# Patient Record
Sex: FEMALE | Race: WHITE | NOT HISPANIC OR LATINO | Employment: UNEMPLOYED | URBAN - NONMETROPOLITAN AREA
[De-identification: names, ages, dates, MRNs, and addresses within clinical notes are randomized per-mention and may not be internally consistent; named-entity substitution may affect disease eponyms.]

---

## 2017-01-03 ENCOUNTER — CLINICAL SUPPORT (OUTPATIENT)
Dept: ORTHOPEDIC SURGERY | Facility: CLINIC | Age: 82
End: 2017-01-03

## 2017-01-03 DIAGNOSIS — M17.0 PRIMARY OSTEOARTHRITIS OF BOTH KNEES: Primary | ICD-10-CM

## 2017-01-03 PROCEDURE — 20610 DRAIN/INJ JOINT/BURSA W/O US: CPT | Performed by: NURSE PRACTITIONER

## 2017-01-03 NOTE — PROGRESS NOTES
Knee Joint Injection      Patient: Monserrat Downs    YOB: 1930    Chief Complaint   Patient presents with   • Left Knee - Injections     #2 orthovisc   • Right Knee - Injections     #2 orthovisc       History of Present Illness:  Patient is here for an injection.  No new complaints.    Physical Exam: 86 y.o. female  General Appearance:    Alert, cooperative, in no acute distress                   There were no vitals filed for this visit.     Patient is alert and oriented, ×3 no acute distress.  Affect is normal respiratory rate is normal unlabored.  Exam and complaints are unchanged.    Procedure:  Large Joint Arthrocentesis  Date/Time: 1/3/2017 3:40 PM  Consent given by: patient  Site marked: site marked  Timeout: Immediately prior to procedure a time out was called to verify the correct patient, procedure, equipment, support staff and site/side marked as required   Supporting Documentation  Indications: pain   Procedure Details  Location: knee - R knee  Preparation: Patient was prepped and draped in the usual sterile fashion  Needle size: 22 G  Approach: anteromedial  Medications administered: 30 mg Hyaluronan 30 MG/2ML  Patient tolerance: patient tolerated the procedure well with no immediate complications      Large Joint Arthrocentesis  Date/Time: 1/3/2017 3:40 PM  Consent given by: patient  Site marked: site marked  Timeout: Immediately prior to procedure a time out was called to verify the correct patient, procedure, equipment, support staff and site/side marked as required   Supporting Documentation  Indications: pain   Procedure Details  Location: knee - L knee  Preparation: Patient was prepped and draped in the usual sterile fashion  Needle size: 22 G  Approach: lateral  Medications administered: 30 mg Hyaluronan 30 MG/2ML  Patient tolerance: patient tolerated the procedure well with no immediate complications            Assessment:    Diagnoses and all orders for this visit:    Primary  osteoarthritis of both knees  -     Large Joint Arthrocentesis  -     Large Joint Arthrocentesis        Plan:   Slowly increase activity as tolerated.  Discussed importance of leg strengthening and general conditioning.  Discussed warning signs of injection.  Discussed that purpose of injections was symptom improvement and improved activity.  Also discussed that further treatment options depended on symptoms at followup and length of time of improvement after injections.    No Follow-up on file.    Jermain Marcelo, APRN

## 2017-01-03 NOTE — MR AVS SNAPSHOT
Connie Day   1/3/2017 3:40 PM   Clinical Support    Dept Phone:  [phone]   Encounter #:  [id number]    Provider:  PIETER Sherman   Department:  [facility] ORTHOPEDICS                Your Full Care Plan              Your Updated Medication List          This list is accurate as of: 1/3/17  4:45 PM.  Always use your most recent med list.                acetaminophen 325 MG tablet   Commonly known as:  TYLENOL       albuterol 108 (90 BASE) MCG/ACT inhaler   Commonly known as:  PROVENTIL HFA;VENTOLIN HFA   Inhale 2 puffs Every 4 (Four) Hours As Needed for wheezing.       aspirin 81 MG EC tablet       atorvastatin 10 MG tablet   Commonly known as:  LIPITOR   TAKE 1 TABLET DAILY       celecoxib 200 MG capsule   Commonly known as:  CeleBREX   TAKE 1 CAPSULE DAILY       CENTRUM SILVER PO       CITRACAL + D PO       docusate sodium 100 MG capsule   Commonly known as:  COLACE   Take 1 capsule by mouth 2 (Two) Times a Day.       fish oil 1000 MG capsule capsule       polyethylene glycol packet   Commonly known as:  MIRALAX       senna 8.6 MG tablet tablet   Commonly known as:  SENOKOT               We Performed the Following     Large Joint Arthrocentesis     Large Joint Arthrocentesis       You Were Diagnosed With        Codes Comments    Primary osteoarthritis of both knees    -  Primary ICD-10-CM: M17.0  ICD-9-CM: 715.16       Medications to be Given to You by a Medical Professional     Due       Frequency    12/6/2016 cyanocobalamin injection 1,000 mcg  Every 28 Days    1/2/2017 cyanocobalamin injection 1,000 mcg  Every 30 Days      Instructions     None    Patient Instructions History      Upcoming Appointments     Visit Type Date Time Department    INJECTION 1/3/2017  3:40 PM [facility]    INJECTION 1/10/2017  1:00 PM [facility]    INJECTION 1/17/2017  1:00 PM [facility]    OFFICE VISIT 5/17/2017  1:30 PM [facility]         MyChart Signup     Our records indicate that you have declined [facility] sign[facility]. If you would like to sign up for [facility], please email [email or web address] or call [phone] to obtain an activation code.             Other Info from Your Visit           Your Appointments     Raji 10, 2017  1:00 PM CST   Injection with PIETER Sherman   [facility] ORTHOPEDICS (--)    [address]  [facility] [address] [phone]            Jan 17, 2017  1:00 PM CST   Injection with PIETER Sherman   [facility] ORTHOPEDICS (--)    [address]  [facility] [address] [phone]            May 17, 2017  1:30 PM CDT   Office Visit with Mino Williamson MD   [facility] FAMILY MEDICINE (--)    [address] [place] [address] [phone]           Arrive 15 minutes prior to appointment.              Allergies     No Known Allergies      Reason for Visit     Left Knee - Injections #2 orthovisc    Right Knee - Injections #2 orthovisc      Vital Signs     Smoking Status                   Never Smoker           Problems and Diagnoses Noted     Primary osteoarthritis of both knees

## 2017-01-09 ENCOUNTER — CLINICAL SUPPORT (OUTPATIENT)
Dept: FAMILY MEDICINE CLINIC | Facility: CLINIC | Age: 82
End: 2017-01-09

## 2017-01-09 DIAGNOSIS — D51.0 PERNICIOUS ANEMIA: Primary | ICD-10-CM

## 2017-01-09 PROCEDURE — 96372 THER/PROPH/DIAG INJ SC/IM: CPT | Performed by: FAMILY MEDICINE

## 2017-01-09 RX ADMIN — CYANOCOBALAMIN 1000 MCG: 1000 INJECTION, SOLUTION INTRAMUSCULAR; SUBCUTANEOUS at 11:32

## 2017-01-10 ENCOUNTER — CLINICAL SUPPORT (OUTPATIENT)
Dept: ORTHOPEDIC SURGERY | Facility: CLINIC | Age: 82
End: 2017-01-10

## 2017-01-10 DIAGNOSIS — M25.561 CHRONIC PAIN OF BOTH KNEES: ICD-10-CM

## 2017-01-10 DIAGNOSIS — G89.29 CHRONIC PAIN OF BOTH KNEES: ICD-10-CM

## 2017-01-10 DIAGNOSIS — M25.562 CHRONIC PAIN OF BOTH KNEES: ICD-10-CM

## 2017-01-10 DIAGNOSIS — M17.0 PRIMARY OSTEOARTHRITIS OF BOTH KNEES: Primary | ICD-10-CM

## 2017-01-10 PROCEDURE — 20610 DRAIN/INJ JOINT/BURSA W/O US: CPT | Performed by: NURSE PRACTITIONER

## 2017-01-10 NOTE — PROGRESS NOTES
Knee Joint Injection      Patient: Monserrat Downs    YOB: 1930    Chief Complaint   Patient presents with   • Right Knee - Follow-up   • Left Knee - Follow-up   • Injections     orthovisc #3 bi lat knees       History of Present Illness:  Patient is here for an injection.  No new complaints.    Physical Exam: 86 y.o. female  General Appearance:    Alert, cooperative, in no acute distress                   There were no vitals filed for this visit.     Patient is alert and oriented, ×3 no acute distress.  Affect is normal respiratory rate is normal unlabored.  Exam and complaints are unchanged.    Procedure:  Large Joint Arthrocentesis  Date/Time: 1/10/2017 1:08 PM  Consent given by: patient  Site marked: site marked  Timeout: Immediately prior to procedure a time out was called to verify the correct patient, procedure, equipment, support staff and site/side marked as required   Supporting Documentation  Indications: pain   Procedure Details  Location: knee - R knee  Preparation: Patient was prepped and draped in the usual sterile fashion  Needle size: 22 G  Approach: anteromedial  Medications administered: 30 mg Hyaluronan 30 MG/2ML  Patient tolerance: patient tolerated the procedure well with no immediate complications    Large Joint Arthrocentesis  Date/Time: 1/10/2017 1:08 PM  Consent given by: patient  Site marked: site marked  Timeout: Immediately prior to procedure a time out was called to verify the correct patient, procedure, equipment, support staff and site/side marked as required   Supporting Documentation  Indications: pain   Procedure Details  Location: knee - L knee  Needle size: 22 G  Approach: lateral  Medications administered: 30 mg Hyaluronan 30 MG/2ML  Patient tolerance: patient tolerated the procedure well with no immediate complications          Assessment:    Diagnoses and all orders for this visit:    Primary osteoarthritis of both knees  -     Large Joint Arthrocentesis  -      Large Joint Arthrocentesis    Chronic pain of both knees  -     Large Joint Arthrocentesis  -     Large Joint Arthrocentesis        Plan:   Slowly increase activity as tolerated.  Discussed importance of leg strengthening and general conditioning.  Discussed warning signs of injection.  Discussed that purpose of injections was symptom improvement and improved activity.  Also discussed that further treatment options depended on symptoms at followup and length of time of improvement after injections.    No Follow-up on file.    Jermain Marcelo, APRN

## 2017-01-10 NOTE — MR AVS SNAPSHOT
Connie Day   1/10/2017 1:00 PM   Clinical Support    Dept Phone:  [phone]   Encounter #:  [id number]    Provider:  PIETER Sherman   Department:  [facility] ORTHOPEDICS                Your Full Care Plan              Your Updated Medication List          This list is accurate as of: 1/10/17  1:19 PM.  Always use your most recent med list.                acetaminophen 325 MG tablet   Commonly known as:  TYLENOL       albuterol 108 (90 BASE) MCG/ACT inhaler   Commonly known as:  PROVENTIL HFA;VENTOLIN HFA   Inhale 2 puffs Every 4 (Four) Hours As Needed for wheezing.       aspirin 81 MG EC tablet       atorvastatin 10 MG tablet   Commonly known as:  LIPITOR   TAKE 1 TABLET DAILY       celecoxib 200 MG capsule   Commonly known as:  CeleBREX   TAKE 1 CAPSULE DAILY       CENTRUM SILVER PO       CITRACAL + D PO       docusate sodium 100 MG capsule   Commonly known as:  COLACE   Take 1 capsule by mouth 2 (Two) Times a Day.       fish oil 1000 MG capsule capsule       polyethylene glycol packet   Commonly known as:  MIRALAX       senna 8.6 MG tablet tablet   Commonly known as:  SENOKOT               We Performed the Following     Large Joint Arthrocentesis     Large Joint Arthrocentesis       You Were Diagnosed With        Codes Comments    Primary osteoarthritis of both knees    -  Primary ICD-10-CM: M17.0  ICD-9-CM: 715.16     Chronic pain of both knees     ICD-10-CM: M25.561, M25.562, G89.29  ICD-9-CM: 719.46, 338.29       Medications to be Given to You by a Medical Professional     Due       Frequency    12/6/2016 cyanocobalamin injection 1,000 mcg  Every 28 Days    1/2/2017 cyanocobalamin injection 1,000 mcg  Every 30 Days      Instructions     None    Patient Instructions History      Upcoming Appointments     Visit Type Date Time Department    INJECTION 1/10/2017  1:00 PM [facility]    INJECTION 1/17/2017  1:00 PM [facility]    OFFICE VISIT  5/17/2017  1:30 PM [facility] Tomas Signup     Our records indicate that you have declined [facility] signup. If you would like to sign up for The 19[facility] Floor[facility], please email [email or web address] or call [phone] to obtain an activation code.             Other Info from Your Visit           Your Appointments     Jan 17, 2017  1:00 PM CST   Injection with PIETER Sherman   [facility] ORTHOPEDICS (--)    [address]  [facility] [address] [phone]            May 17, 2017  1:30 PM CDT   Office Visit with Mino Williamson MD   [facility] FAMILY MEDICINE (--)    [address] [place] [address] [phone]           Arrive 15 minutes prior to appointment.              Allergies     No Known Allergies      Reason for Visit     Right Knee - Follow-up     Left Knee - Follow-up     Injections orthovisc #3 bi lat knees      Vital Signs     Smoking Status                   Never Smoker           Problems and Diagnoses Noted     Chronic pain of both knees    Primary osteoarthritis of both knees

## 2017-01-17 ENCOUNTER — CLINICAL SUPPORT (OUTPATIENT)
Dept: ORTHOPEDIC SURGERY | Facility: CLINIC | Age: 82
End: 2017-01-17

## 2017-01-17 DIAGNOSIS — M25.561 CHRONIC PAIN OF BOTH KNEES: ICD-10-CM

## 2017-01-17 DIAGNOSIS — G89.29 CHRONIC PAIN OF BOTH KNEES: ICD-10-CM

## 2017-01-17 DIAGNOSIS — M17.0 PRIMARY OSTEOARTHRITIS OF BOTH KNEES: Primary | ICD-10-CM

## 2017-01-17 DIAGNOSIS — M25.562 CHRONIC PAIN OF BOTH KNEES: ICD-10-CM

## 2017-01-17 PROCEDURE — 20610 DRAIN/INJ JOINT/BURSA W/O US: CPT | Performed by: NURSE PRACTITIONER

## 2017-01-17 NOTE — MR AVS SNAPSHOT
Connie Day   1/17/2017 1:00 PM   Clinical Support    Dept Phone:  [phone]   Encounter #:  [id number]    Provider:  PIETER Sherman   Department:  [facility] ORTHOPEDICS                Your Full Care Plan              Your Updated Medication List          This list is accurate as of: 1/17/17  1:30 PM.  Always use your most recent med list.                acetaminophen 325 MG tablet   Commonly known as:  TYLENOL       albuterol 108 (90 BASE) MCG/ACT inhaler   Commonly known as:  PROVENTIL HFA;VENTOLIN HFA   Inhale 2 puffs Every 4 (Four) Hours As Needed for wheezing.       aspirin 81 MG EC tablet       atorvastatin 10 MG tablet   Commonly known as:  LIPITOR   TAKE 1 TABLET DAILY       celecoxib 200 MG capsule   Commonly known as:  CeleBREX   TAKE 1 CAPSULE DAILY       CENTRUM SILVER PO       CITRACAL + D PO       docusate sodium 100 MG capsule   Commonly known as:  COLACE   Take 1 capsule by mouth 2 (Two) Times a Day.       fish oil 1000 MG capsule capsule       polyethylene glycol packet   Commonly known as:  MIRALAX       senna 8.6 MG tablet tablet   Commonly known as:  SENOKOT               We Performed the Following     Large Joint Arthrocentesis     Large Joint Arthrocentesis       You Were Diagnosed With        Codes Comments    Primary osteoarthritis of both knees    -  Primary ICD-10-CM: M17.0  ICD-9-CM: 715.16     Chronic pain of both knees     ICD-10-CM: M25.561, M25.562, G89.29  ICD-9-CM: 719.46, 338.29       Medications to be Given to You by a Medical Professional     Due       Frequency    12/6/2016 cyanocobalamin injection 1,000 mcg  Every 28 Days    (none) cyanocobalamin injection 1,000 mcg  Every 30 Days      Instructions     None    Patient Instructions History      Upcoming Appointments     Visit Type Date Time Department    INJECTION 1/17/2017  1:00 PM [facility]    FOLLOW UP 2/22/2017 10:30 AM [facility]    OFFICE VISIT  5/17/2017  1:30 PM [facility] Signup     Our records indicate that you have declined [facility] [employer] signup. If you would like to sign up for [employer], please email [email or web address] or call [phone] to obtain an activation code.             Other Info from Your Visit           Your Appointments     Feb 22, 2017 10:30 AM CST   Follow Up with Juan M Sandoval MD   [facility] ORTHOPEDICS (--)    [address] [place] [address] [address] [place] [address] [phone]           Arrive 15 minutes prior to appointment.            May 17, 2017  1:30 PM CDT   Office Visit with Mino Williamson MD   [facility] FAMILY MEDICINE (--)    [address] [place] [address] [phone]           Arrive 15 minutes prior to appointment.              Allergies     No Known Allergies      Reason for Visit     Left Knee - Injections #4 orthovisc    Right Knee - Injections #4 orthovisc.         Vital Signs     Smoking Status                   Never Smoker           Problems and Diagnoses Noted     Chronic pain of both knees    Primary osteoarthritis of both knees

## 2017-01-17 NOTE — PROGRESS NOTES
Knee Joint Injection      Patient: Monserrat Downs    YOB: 1930    Chief Complaint   Patient presents with   • Left Knee - Injections     #4 orthovisc   • Right Knee - Injections     #4 orthovisc.          History of Present Illness:  Patient is here for an injection.  No new complaints.    Physical Exam: 86 y.o. female  General Appearance:    Alert, cooperative, in no acute distress                   There were no vitals filed for this visit.     Patient is alert and oriented, ×3 no acute distress.  Affect is normal respiratory rate is normal unlabored.  Exam and complaints are unchanged.    Procedure:  Large Joint Arthrocentesis  Date/Time: 1/17/2017 1:17 PM  Consent given by: patient  Site marked: site marked  Timeout: Immediately prior to procedure a time out was called to verify the correct patient, procedure, equipment, support staff and site/side marked as required   Supporting Documentation  Indications: pain   Procedure Details  Location: knee - R knee  Preparation: Patient was prepped and draped in the usual sterile fashion  Needle size: 22 G  Approach: anteromedial  Medications administered: 30 mg Hyaluronan 30 MG/2ML  Patient tolerance: patient tolerated the procedure well with no immediate complications    Large Joint Arthrocentesis  Date/Time: 1/17/2017 1:17 PM  Consent given by: patient  Site marked: site marked  Timeout: Immediately prior to procedure a time out was called to verify the correct patient, procedure, equipment, support staff and site/side marked as required   Supporting Documentation  Indications: pain   Procedure Details  Location: knee - L knee  Preparation: Patient was prepped and draped in the usual sterile fashion  Needle size: 22 G  Approach: lateral  Medications administered: 30 mg Hyaluronan 30 MG/2ML  Patient tolerance: patient tolerated the procedure well with no immediate complications          Assessment:    Diagnoses and all orders for this visit:    Primary  osteoarthritis of both knees  -     Large Joint Arthrocentesis  -     Large Joint Arthrocentesis    Chronic pain of both knees  -     Large Joint Arthrocentesis  -     Large Joint Arthrocentesis        Plan:   Slowly increase activity as tolerated.  Discussed importance of leg strengthening and general conditioning.  Discussed warning signs of injection.  Discussed that purpose of injections was symptom improvement and improved activity.  Also discussed that further treatment options depended on symptoms at followup and length of time of improvement after injections.    No Follow-up on file.    Jermain Marcelo, APRN

## 2017-02-02 ENCOUNTER — CLINICAL SUPPORT (OUTPATIENT)
Dept: FAMILY MEDICINE CLINIC | Facility: CLINIC | Age: 82
End: 2017-02-02

## 2017-02-02 DIAGNOSIS — D51.0 PERNICIOUS ANEMIA: Primary | ICD-10-CM

## 2017-02-02 PROCEDURE — 96372 THER/PROPH/DIAG INJ SC/IM: CPT | Performed by: FAMILY MEDICINE

## 2017-02-02 RX ADMIN — CYANOCOBALAMIN 1000 MCG: 1000 INJECTION, SOLUTION INTRAMUSCULAR; SUBCUTANEOUS at 14:28

## 2017-02-22 ENCOUNTER — OFFICE VISIT (OUTPATIENT)
Dept: ORTHOPEDIC SURGERY | Facility: CLINIC | Age: 82
End: 2017-02-22

## 2017-02-22 VITALS — HEIGHT: 65 IN | WEIGHT: 143 LBS | BODY MASS INDEX: 23.82 KG/M2

## 2017-02-22 DIAGNOSIS — M25.562 CHRONIC PAIN OF BOTH KNEES: ICD-10-CM

## 2017-02-22 DIAGNOSIS — M54.10 RADICULOPATHY OF LEG: ICD-10-CM

## 2017-02-22 DIAGNOSIS — M17.0 PRIMARY OSTEOARTHRITIS OF BOTH KNEES: Primary | ICD-10-CM

## 2017-02-22 DIAGNOSIS — M25.561 CHRONIC PAIN OF BOTH KNEES: ICD-10-CM

## 2017-02-22 DIAGNOSIS — G89.29 CHRONIC PAIN OF BOTH KNEES: ICD-10-CM

## 2017-02-22 DIAGNOSIS — I10 ESSENTIAL HYPERTENSION: ICD-10-CM

## 2017-02-22 PROCEDURE — 99214 OFFICE O/P EST MOD 30 MIN: CPT | Performed by: ORTHOPAEDIC SURGERY

## 2017-02-22 NOTE — PROGRESS NOTES
Monserrat Downs is a 86 y.o. female returns for     Chief Complaint   Patient presents with   • Left Knee - Follow-up   • Right Knee - Follow-up       HISTORY OF PRESENT ILLNESS:  Patient completed series of orthovisc injection  sunil knees done on 1/17/2017 by ROD Marcelo. Physical therapy with [facility], started on 1/9/2017 has completed 13 visits. Patient thinks therapy has helped knees are doing some better.  Having pain in both hips along the buttocks and lateral aspect of hips.  Pain in hips is worse than pain in knees.  Limited mobility.  No groin pain.       CONCURRENT MEDICAL HISTORY:    Past Medical History   Diagnosis Date   • Abscess of buttock    • Anorectal abscess    • Benign essential hypertension    • Benign hypertension    • Carotid artery stenosis    • Cellulitis, abdominal wall    • Closed fracture of femur    • Hip pain    • Hyperglycemia    • Hyperlipidemia    • Influenza vaccine needed    • Osteoarthritis    • Primary generalized (osteo)arthritis    • S/P breast biopsy, right        No Known Allergies      Current Outpatient Prescriptions:   •  acetaminophen (TYLENOL) 325 MG tablet, 650 mg tablet(s) every 4 hours Oral PRN, Disp: , Rfl:   •  albuterol (PROVENTIL HFA;VENTOLIN HFA) 108 (90 BASE) MCG/ACT inhaler, Inhale 2 puffs Every 4 (Four) Hours As Needed for wheezing., Disp: 18 g, Rfl: 11  •  aspirin 81 MG EC tablet, 1 tablet, delayed release (DR/EC) at bedtime Oral, Disp: , Rfl:   •  atorvastatin (LIPITOR) 10 MG tablet, TAKE 1 TABLET DAILY, Disp: 90 tablet, Rfl: 2  •  Calcium Citrate-Vitamin D (CITRACAL + D PO), 1 tablet, chewable every other day Oral, Disp: , Rfl:   •  celecoxib (CeleBREX) 200 MG capsule, TAKE 1 CAPSULE DAILY, Disp: 90 capsule, Rfl: 2  •  docusate sodium (COLACE) 100 MG capsule, Take 1 capsule by mouth 2 (Two) Times a Day., Disp: 60 capsule, Rfl: 0  •  Multiple Vitamins-Minerals (CENTRUM SILVER PO), 1 tablet every other day Oral, Disp: , Rfl:   •  Omega-3 Fatty Acids (FISH OIL) 1000  "MG capsule capsule, 1 capsule every day with supper Oral, Disp: , Rfl:   •  polyethylene glycol (MIRALAX) packet, Take 17 g by mouth Daily., Disp: , Rfl:   •  senna (SENOKOT) 8.6 MG tablet tablet, 2 tablet every day with supper Oral PRN, Disp: , Rfl:     Current Facility-Administered Medications:   •  cyanocobalamin injection 1,000 mcg, 1,000 mcg, Intramuscular, Q30 Days, Mino Williamson MD, 1,000 mcg at 01/09/17 1132  •  cyanocobalamin injection 1,000 mcg, 1,000 mcg, Intramuscular, Q28 Days, Mino Williamson MD, 1,000 mcg at 02/02/17 1428    Past Surgical History   Procedure Laterality Date   • Injection of medication  07/12/2016     B12 (3)   • Injection of medication  05/04/2016     Depo Medrol (Methylprednisone) (1)   • Injection of medication  10/27/2015     Depo Medrol (Methylprednisone) 80mg (8)   • Intramedullary nailing/rodding humerus, tibia, femur  03/14/2014     Orthotic procedure   • Injection of medication  10/03/2012     Rocephin (1)   • Injection of medication  06/26/2015     Toradol (6)       ROS  No fevers or chills.  No chest pain or shortness of air.  No GI or  disturbances.  All other systems reviewed as negative.    PHYSICAL EXAMINATION:       Visit Vitals   • Ht 65\" (165.1 cm)   • Wt 143 lb (64.9 kg)   • BMI 23.8 kg/m2       Physical Exam   Constitutional: She is oriented to person, place, and time. She appears well-developed and well-nourished.   Neurological: She is alert and oriented to person, place, and time.   Psychiatric: She has a normal mood and affect. Her behavior is normal. Judgment and thought content normal.       GAIT:     []  Normal  [x]  Antalgic    Assistive device: []  None  [x]  Walker     []  Crutches  []  Cane     []  Wheelchair  []  Stretcher    Right Knee Exam     Tenderness   Right knee tenderness location: diffuse.    Range of Motion   Extension: -5   Flexion: 120     Muscle Strength     The patient has normal right knee strength.    Tests   Drawer:       " Anterior - negative    Posterior - negative  Varus: negative  Valgus: negative    Other   Sensation: normal  Pulse: present  Swelling: mild    Comments:  Crepitation on motion.  Mild to moderate pain through arc of motion      Left Knee Exam     Tenderness   Left knee tenderness location: diffuse.    Range of Motion   Extension: -5   Flexion: 120     Muscle Strength     The patient has normal left knee strength.    Tests   Drawer:       Anterior - negative     Posterior - negative  Varus: negative  Valgus: negative    Other   Sensation: normal  Pulse: present  Swelling: none    Comments:  Crepitation with motion  Mild to moderate pain through arc of motion      Right Hip Exam     Tenderness   The patient is experiencing no tenderness.         Range of Motion   Internal Rotation: 30   External Rotation: 50     Muscle Strength   The patient has normal right hip strength.    Tests   STEPHANIE: negative  Fadir:  Negative FADIR test    Other   Erythema: absent  Sensation: normal  Pulse: present      Left Hip Exam     Tenderness   The patient is experiencing no tenderness.         Range of Motion   Internal Rotation: 30   External Rotation: 50     Muscle Strength   The patient has normal left hip strength.     Tests   STEPHANIE: negative  Fadir:  Negative FADIR test    Other   Erythema: absent  Sensation: normal  Pulse: present              PROCEDURE- Right Knee three views      REASON FOR EXAM-               [id number]- PAIN IN RIGHT KNEE  [id number]- PAIN IN LEFT KNEE  - OTHER CHRONIC PAIN      FINDINGS- . Severe loss of right knee lateral knee joint  compartment height with bone appearing to oppose bone. Severe loss of  patellofemoral joint space. Otherwise bony structures unremarkable. No  evidence of fracture or dislocation. Soft tissue structures  unremarkable.      IMPRESSION-   1. Severe osteoarthritic changes of the right knee..  2. No acute right knee abnormality..      PROCEDURE- Left Knee three views      REASON FOR  EXAM-               [id number]- PAIN IN RIGHT KNEE  [id number]- PAIN IN LEFT KNEE  - OTHER CHRONIC PAIN      FINDINGS- . Intramedullary alee is seen fixating the left femur  with distal cortical screws fixating the alee in place. No evidence of  acute fracture or dislocation. Severe loss of left medial knee joint  compartment height with bone appearing to oppose bone. Multiple small  calcified loose bodies within the joint space. Soft tissue structures  unremarkable.      IMPRESSION-   1. Severe osteoarthritic changes of the left knee with associated  multiple joint space calcified loose bodies..  2. No acute left knee abnormality.        ASSESSMENT:    Diagnoses and all orders for this visit:    Primary osteoarthritis of both knees    Chronic pain of both knees    Radiculopathy of leg  -     MRI Lumbar Spine Without Contrast; Future    Essential hypertension          PLAN    Not having groin pain, good motion in both hips, and not having tenderness on greater trochanter either hip.  Could be radiculopathy of both legs due to spinal stenosis or nerve root impingement.  Recheck after MRI.  Definitely has severe OA both knees and was not improved with injections.    Return for recheck for MRI results.    Juan M Sandoval MD

## 2017-02-23 DIAGNOSIS — G89.29 CHRONIC PAIN OF BOTH KNEES: ICD-10-CM

## 2017-02-23 DIAGNOSIS — M17.0 PRIMARY OSTEOARTHRITIS OF BOTH KNEES: Primary | ICD-10-CM

## 2017-02-23 DIAGNOSIS — M25.562 CHRONIC PAIN OF BOTH KNEES: ICD-10-CM

## 2017-02-23 DIAGNOSIS — M25.561 CHRONIC PAIN OF BOTH KNEES: ICD-10-CM

## 2017-03-03 ENCOUNTER — HOSPITAL ENCOUNTER (OUTPATIENT)
Dept: MRI IMAGING | Facility: HOSPITAL | Age: 82
Discharge: HOME | End: 2017-03-03
Admitting: ORTHOPAEDIC SURGERY

## 2017-03-03 DIAGNOSIS — M54.10 RADICULOPATHY OF LEG: ICD-10-CM

## 2017-03-03 PROCEDURE — 72148 MRI LUMBAR SPINE W/O DYE: CPT

## 2017-03-15 ENCOUNTER — CLINICAL SUPPORT (OUTPATIENT)
Dept: FAMILY MEDICINE CLINIC | Facility: CLINIC | Age: 82
End: 2017-03-15

## 2017-03-15 DIAGNOSIS — D51.0 PERNICIOUS ANEMIA: Primary | ICD-10-CM

## 2017-03-15 PROCEDURE — 96372 THER/PROPH/DIAG INJ SC/IM: CPT | Performed by: FAMILY MEDICINE

## 2017-03-15 RX ADMIN — CYANOCOBALAMIN 1000 MCG: 1000 INJECTION, SOLUTION INTRAMUSCULAR; SUBCUTANEOUS at 09:11

## 2017-03-31 ENCOUNTER — OFFICE VISIT (OUTPATIENT)
Dept: ORTHOPEDIC SURGERY | Facility: CLINIC | Age: 82
End: 2017-03-31

## 2017-03-31 VITALS — HEIGHT: 65 IN | BODY MASS INDEX: 23.82 KG/M2 | WEIGHT: 143 LBS

## 2017-03-31 DIAGNOSIS — I73.9 CLAUDICATION IN PERIPHERAL VASCULAR DISEASE (HCC): Primary | ICD-10-CM

## 2017-03-31 DIAGNOSIS — M70.61 TROCHANTERIC BURSITIS OF RIGHT HIP: ICD-10-CM

## 2017-03-31 DIAGNOSIS — M54.10 RADICULOPATHY OF LEG: Primary | ICD-10-CM

## 2017-03-31 DIAGNOSIS — M17.0 PRIMARY OSTEOARTHRITIS OF KNEES, BILATERAL: ICD-10-CM

## 2017-03-31 DIAGNOSIS — M54.5 CHRONIC LOW BACK PAIN, UNSPECIFIED BACK PAIN LATERALITY, WITH SCIATICA PRESENCE UNSPECIFIED: ICD-10-CM

## 2017-03-31 DIAGNOSIS — G89.29 CHRONIC LOW BACK PAIN, UNSPECIFIED BACK PAIN LATERALITY, WITH SCIATICA PRESENCE UNSPECIFIED: ICD-10-CM

## 2017-03-31 PROBLEM — M54.50 CHRONIC LOW BACK PAIN: Status: ACTIVE | Noted: 2017-03-31

## 2017-03-31 PROCEDURE — 99213 OFFICE O/P EST LOW 20 MIN: CPT | Performed by: ORTHOPAEDIC SURGERY

## 2017-03-31 NOTE — PROGRESS NOTES
Monserrat Downs is a 86 y.o. female returns for     Chief Complaint   Patient presents with   • Lumbar Spine - Follow-up, Results       HISTORY OF PRESENT ILLNESS:   mri done at [facility], [place], operated by [facility] on 3/3/2017  Still having pain in her buttocks and on the sides of her hips.  She did not really have pain in the groin regions.  She states that physical therapy did seem to help her knees but did not help her hips.  She still having pain and having numbness and tingling in the lateral aspect of the right ankle.       CONCURRENT MEDICAL HISTORY:    Past Medical History:   Diagnosis Date   • Abscess of buttock    • Anorectal abscess    • Benign essential hypertension    • Benign hypertension    • Carotid artery stenosis    • Cellulitis, abdominal wall    • Closed fracture of femur    • Hip pain    • Hyperglycemia    • Hyperlipidemia    • Influenza vaccine needed    • Osteoarthritis    • Primary generalized (osteo)arthritis    • S/P breast biopsy, right        No Known Allergies      Current Outpatient Prescriptions:   •  acetaminophen (TYLENOL) 325 MG tablet, 650 mg tablet(s) every 4 hours Oral PRN, Disp: , Rfl:   •  albuterol (PROVENTIL HFA;VENTOLIN HFA) 108 (90 BASE) MCG/ACT inhaler, Inhale 2 puffs Every 4 (Four) Hours As Needed for wheezing., Disp: 18 g, Rfl: 11  •  aspirin 81 MG EC tablet, 1 tablet, delayed release (DR/EC) at bedtime Oral, Disp: , Rfl:   •  atorvastatin (LIPITOR) 10 MG tablet, TAKE 1 TABLET DAILY, Disp: 90 tablet, Rfl: 2  •  Calcium Citrate-Vitamin D (CITRACAL + D PO), 1 tablet, chewable every other day Oral, Disp: , Rfl:   •  celecoxib (CeleBREX) 200 MG capsule, TAKE 1 CAPSULE DAILY, Disp: 90 capsule, Rfl: 2  •  docusate sodium (COLACE) 100 MG capsule, Take 1 capsule by mouth 2 (Two) Times a Day., Disp: 60 capsule, Rfl: 0  •  Multiple Vitamins-Minerals (CENTRUM SILVER PO), 1 tablet every other day Oral, Disp: , Rfl:   •  Omega-3 Fatty Acids (FISH OIL) 1000 MG capsule capsule, 1 capsule every day with supper Oral,  "Disp: , Rfl:   •  polyethylene glycol (MIRALAX) packet, Take 17 g by mouth Daily., Disp: , Rfl:   •  senna (SENOKOT) 8.6 MG tablet tablet, 2 tablet every day with supper Oral PRN, Disp: , Rfl:     Current Facility-Administered Medications:   •  cyanocobalamin injection 1,000 mcg, 1,000 mcg, Intramuscular, Q30 Days, Mino Williamson MD, 1,000 mcg at 01/09/17 1132  •  cyanocobalamin injection 1,000 mcg, 1,000 mcg, Intramuscular, Q28 Days, Mino Williamson MD, 1,000 mcg at 03/15/17 0911    Past Surgical History:   Procedure Laterality Date   • INJECTION OF MEDICATION  07/12/2016    B12 (3)   • INJECTION OF MEDICATION  05/04/2016    Depo Medrol (Methylprednisone) (1)   • INJECTION OF MEDICATION  10/27/2015    Depo Medrol (Methylprednisone) 80mg (8)   • INJECTION OF MEDICATION  10/03/2012    Rocephin (1)   • INJECTION OF MEDICATION  06/26/2015    Toradol (6)   • INTRAMEDULLARY NAILING/RODDING HUMERUS, TIBIA, FEMUR  03/14/2014    Orthotic procedure       ROS  No fevers or chills.  No chest pain or shortness of air.  No GI or  disturbances.    PHYSICAL EXAMINATION:       Ht 65\" (165.1 cm)  Wt 143 lb (64.9 kg)  BMI 23.8 kg/m2    Physical Exam   Constitutional: She is oriented to person, place, and time. She appears well-developed and well-nourished.   Neurological: She is alert and oriented to person, place, and time.   Psychiatric: She has a normal mood and affect. Her behavior is normal. Judgment and thought content normal.       GAIT:     []  Normal  []  Antalgic    Assistive device: []  None  [x]  Walker     []  Crutches  []  Cane     []  Wheelchair  []  Stretcher    Right Knee Exam     Tenderness   Right knee tenderness location: diffuse.    Range of Motion   Extension: -5   Flexion: 120     Muscle Strength     The patient has normal right knee strength.    Tests   Drawer:       Anterior - negative    Posterior - negative  Varus: negative  Valgus: negative    Other   Sensation: normal  Pulse: present  Swelling: " mild    Comments:  Crepitation on motion.  Mild to moderate pain through arc of motion      Left Knee Exam     Tenderness   Left knee tenderness location: diffuse.    Range of Motion   Extension: -5   Flexion: 120     Muscle Strength     The patient has normal left knee strength.    Tests   Drawer:       Anterior - negative     Posterior - negative  Varus: negative  Valgus: negative    Other   Sensation: normal  Pulse: present  Swelling: none    Comments:  Crepitation with motion  Mild to moderate pain through arc of motion      Right Hip Exam   Right hip exam is normal.     Tenderness   Right hip tenderness location: Minimal tenderness around the greater trochanter.    Range of Motion   The patient has normal right hip ROM.    Muscle Strength   The patient has normal right hip strength.    Tests   STEPHANIE: negative  Fadir:  Negative FADIR test    Other   Sensation: normal  Pulse: present      Left Hip Exam     Tenderness   Left hip tenderness location: Minimal tenderness around the greater trochanter.    Range of Motion   The patient has normal left hip ROM.    Muscle Strength   The patient has normal left hip strength.     Tests   STEPHANIE: negative  Fadir:  Negative FADIR test    Other   Sensation: normal  Pulse: present              Mri Lumbar Spine Without Contrast    Result Date: 3/3/2017  Narrative: Patient Name:  ADAM ELAM Patient ID:  [id number] Ordering:  KEKE HERRMANN Attending: Referring:  KEKE HERRMANN ------------------------------------------------ MRI lumbar spine without contrast. History: Back pain, radiculopathy. Technique: Multiplanar multisequence noncontrast images lumbar spine. There are multilevel degenerative, spondylotic changes. There are degenerative disc changes at multiple levels. No bone edema, fracture or areas of bony destruction. A facet arthrosis at multiple levels. Disc bulging at multiple levels. No evidence of any focal disc protrusion. No significant central canal  stenosis. Severe right-sided neural foraminal stenosis at L5-S1 moderate left-sided neuroforaminal stenosis at L2-L3. Extensive right-sided lumbar scoliotic curvature.     Impression: CONCLUSION: Multilevel degenerative changes. Degenerative disc changes. Right-sided lumbar scoliotic curvature. Facet arthrosis at multiple levels causing severe right-sided neuroforaminal stenosis at L5-S1 and moderate left-sided neuroforaminal stenosis at L2-L3. No significant central canal stenosis or focal disc protrusion. MRI lumbar spine without contrast is otherwise unremarkable. No evidence of a fracture or bone edema. Electronically signed by:  Carlos Rodríguez MD  3/3/2017 12:53 PM CST Workstation: [id number]            ASSESSMENT:    Diagnoses and all orders for this visit:    Radiculopathy of leg    Trochanteric bursitis of right hip    Chronic low back pain, unspecified back pain laterality, with sciatica presence unspecified    Primary osteoarthritis of knees, bilateral          PLAN    She definitely has osteoarthritis in bilateral knees.  However, her biggest complaint seems to be of the pain in her buttocks.  No order ABIs to assess her vascular status of both legs.  She is also going to get an appointment to follow-up with Dr. Mcdaniels to see if he feels the MRI results are responsible for any of her hip symptoms.  Otherwise, she can follow-up with me on an as-needed basis.  Return for recheck.    Juan M Sandoval MD

## 2017-04-17 ENCOUNTER — OFFICE VISIT (OUTPATIENT)
Dept: ORTHOPEDIC SURGERY | Facility: CLINIC | Age: 82
End: 2017-04-17

## 2017-04-17 VITALS — HEIGHT: 65 IN | WEIGHT: 143 LBS | BODY MASS INDEX: 23.82 KG/M2

## 2017-04-17 DIAGNOSIS — G89.29 CHRONIC LOW BACK PAIN, UNSPECIFIED BACK PAIN LATERALITY, WITH SCIATICA PRESENCE UNSPECIFIED: Primary | ICD-10-CM

## 2017-04-17 DIAGNOSIS — M54.5 CHRONIC LOW BACK PAIN, UNSPECIFIED BACK PAIN LATERALITY, WITH SCIATICA PRESENCE UNSPECIFIED: Primary | ICD-10-CM

## 2017-04-17 DIAGNOSIS — M41.20 SCOLIOSIS (AND KYPHOSCOLIOSIS), IDIOPATHIC: ICD-10-CM

## 2017-04-17 PROCEDURE — 96372 THER/PROPH/DIAG INJ SC/IM: CPT | Performed by: ORTHOPAEDIC SURGERY

## 2017-04-17 PROCEDURE — 99213 OFFICE O/P EST LOW 20 MIN: CPT | Performed by: ORTHOPAEDIC SURGERY

## 2017-04-17 NOTE — PROGRESS NOTES
Monserrat Downs is a 86 y.o. female returns for     Chief Complaint   Patient presents with   • Lumbar Spine - Follow-up       HISTORY OF PRESENT ILLNESS: Patient follows up today for lumbar pain per Dr. Sandoval  Complains of back and buttock pain.  She doesn't really have pain when she is sitting, but has pain when she gets up from a sitting position, from sit to stand.   This pain has been getting worse for the last several years, but she has become more tolerant of this.       CONCURRENT MEDICAL HISTORY:    Past Medical History:   Diagnosis Date   • Abscess of buttock    • Anorectal abscess    • Benign essential hypertension    • Benign hypertension    • Carotid artery stenosis    • Cellulitis, abdominal wall    • Closed fracture of femur    • Hip pain    • Hyperglycemia    • Hyperlipidemia    • Influenza vaccine needed    • Osteoarthritis    • Primary generalized (osteo)arthritis    • S/P breast biopsy, right        No Known Allergies      Current Outpatient Prescriptions:   •  acetaminophen (TYLENOL) 325 MG tablet, 650 mg tablet(s) every 4 hours Oral PRN, Disp: , Rfl:   •  albuterol (PROVENTIL HFA;VENTOLIN HFA) 108 (90 BASE) MCG/ACT inhaler, Inhale 2 puffs Every 4 (Four) Hours As Needed for wheezing., Disp: 18 g, Rfl: 11  •  aspirin 81 MG EC tablet, 1 tablet, delayed release (DR/EC) at bedtime Oral, Disp: , Rfl:   •  atorvastatin (LIPITOR) 10 MG tablet, TAKE 1 TABLET DAILY, Disp: 90 tablet, Rfl: 2  •  Calcium Citrate-Vitamin D (CITRACAL + D PO), 1 tablet, chewable every other day Oral, Disp: , Rfl:   •  celecoxib (CeleBREX) 200 MG capsule, TAKE 1 CAPSULE DAILY, Disp: 90 capsule, Rfl: 2  •  docusate sodium (COLACE) 100 MG capsule, Take 1 capsule by mouth 2 (Two) Times a Day., Disp: 60 capsule, Rfl: 0  •  Multiple Vitamins-Minerals (CENTRUM SILVER PO), 1 tablet every other day Oral, Disp: , Rfl:   •  Omega-3 Fatty Acids (FISH OIL) 1000 MG capsule capsule, 1 capsule every day with supper Oral, Disp: , Rfl:   •   "polyethylene glycol (MIRALAX) packet, Take 17 g by mouth Daily., Disp: , Rfl:   •  senna (SENOKOT) 8.6 MG tablet tablet, 2 tablet every day with supper Oral PRN, Disp: , Rfl:     Current Facility-Administered Medications:   •  cyanocobalamin injection 1,000 mcg, 1,000 mcg, Intramuscular, Q30 Days, Mino Williamson MD, 1,000 mcg at 01/09/17 1132  •  cyanocobalamin injection 1,000 mcg, 1,000 mcg, Intramuscular, Q28 Days, Mino Williamson MD, 1,000 mcg at 03/15/17 0911    Past Surgical History:   Procedure Laterality Date   • INJECTION OF MEDICATION  07/12/2016    B12 (3)   • INJECTION OF MEDICATION  05/04/2016    Depo Medrol (Methylprednisone) (1)   • INJECTION OF MEDICATION  10/27/2015    Depo Medrol (Methylprednisone) 80mg (8)   • INJECTION OF MEDICATION  10/03/2012    Rocephin (1)   • INJECTION OF MEDICATION  06/26/2015    Toradol (6)   • INTRAMEDULLARY NAILING/RODDING HUMERUS, TIBIA, FEMUR  03/14/2014    Orthotic procedure       ROS  No fevers or chills.  No chest pain or shortness of air.  No GI or  disturbances.    PHYSICAL EXAMINATION:       Ht 65\" (165.1 cm)  Wt 143 lb (64.9 kg)  BMI 23.8 kg/m2    Physical Exam    GAIT:     []  Normal  []  Antalgic    Assistive device: []  None  []  Walker     []  Crutches  []  Cane     []  Wheelchair  []  Stretcher    Ortho Exam     Tenderness: Lumbar   Swelling:  Mild       Range of Motion:      Flexion: 10     Extension: 30     Lateral Bend:   Right 10                              Left 10     Rotation:          Right 10                              Left 10       SLR:                  Right Negative                             Left Negative       Muscle Strength      Abductor: 5/5     Adductor: 5/5     Quadriceps: 5/5     Hamstrings: 5/5       Reflexes     Patellar: 0/4    Achilles: 0/4    Babinski: Not done    Biceps: Not done    Triceps: Not done       Sensation: Normal   Gait: Normal   Toe Walk: not done   Heel Walk: exam deferred         Us Ankle / Brachial " Indices Extremity Complete    Result Date: 4/7/2017  Narrative: [facility] [place] Patient Name: ADAM ELAM ORDERING: KEKE HERRMANN REFERRING: KEKE HERRMANN Ultrasound SERA HISTORY: PVD and claudication. Blood pressure measurements were obtained in the arms and at the ankles. Normal ankle brachial indices bilaterally. Right ankle brachial index 1.1. Left ankle brachial index 1.1. Triphasic waveforms.     Impression: CONCLUSION: Normal study. [id number] Electronically signed by:  Arturo Sparks MD  4/7/2017 12:27 PM CDT Workstation: [id number]    MRI Lumbar Spine Without Contrast [[id number]] (Order [id number])    Status: Final result            PACS Images      Radiology Images       Study Result      Patient Name: ADAM ELAM  Patient ID: [id number]   Ordering: KEKE HERRMANN  Attending:  Referring: KEKE HERRMANN  ------------------------------------------------     MRI lumbar spine without contrast.     History: Back pain, radiculopathy.      Technique: Multiplanar multisequence noncontrast images lumbar  spine. There are multilevel degenerative, spondylotic changes.  There are degenerative disc changes at multiple levels. No bone  edema, fracture or areas of bony destruction.      A facet arthrosis at multiple levels. Disc bulging at multiple  levels. No evidence of any focal disc protrusion. No significant  central canal stenosis.   Severe right-sided neural foraminal stenosis at L5-S1 moderate  left-sided neuroforaminal stenosis at L2-L3.  Extensive right-sided lumbar scoliotic curvature.     IMPRESSION:  CONCLUSION: Multilevel degenerative changes. Degenerative disc  changes. Right-sided lumbar scoliotic curvature. Facet arthrosis  at multiple levels causing severe right-sided neuroforaminal  stenosis at L5-S1 and moderate left-sided neuroforaminal stenosis  at L2-L3.     No significant central canal stenosis or focal disc protrusion.      MRI lumbar spine without contrast is otherwise  unremarkable. No  evidence of a fracture or bone edema             ASSESSMENT:    Diagnoses and all orders for this visit:    Chronic low back pain, unspecified back pain laterality, with sciatica presence unspecified          PLAN    May consider removeable soft shell brace as an option.  IM kenalog.      Jeronimo Mcdaniels MD

## 2017-04-17 NOTE — PROGRESS NOTES
Procedure   Procedures      80mg IM kenalog injected right buttock, with alcohol wash and there was no immediate reaction to the medication.

## 2017-04-25 ENCOUNTER — CLINICAL SUPPORT (OUTPATIENT)
Dept: FAMILY MEDICINE CLINIC | Facility: CLINIC | Age: 82
End: 2017-04-25

## 2017-04-25 DIAGNOSIS — D51.0 PERNICIOUS ANEMIA: Primary | ICD-10-CM

## 2017-04-25 PROCEDURE — 96372 THER/PROPH/DIAG INJ SC/IM: CPT | Performed by: FAMILY MEDICINE

## 2017-04-25 RX ADMIN — CYANOCOBALAMIN 1000 MCG: 1000 INJECTION, SOLUTION INTRAMUSCULAR; SUBCUTANEOUS at 15:22

## 2017-05-10 ENCOUNTER — LAB (OUTPATIENT)
Dept: LAB | Facility: OTHER | Age: 82
End: 2017-05-10

## 2017-05-10 DIAGNOSIS — R73.9 HYPERGLYCEMIA: ICD-10-CM

## 2017-05-10 DIAGNOSIS — E53.8 B12 DEFICIENCY: ICD-10-CM

## 2017-05-10 DIAGNOSIS — E78.5 HYPERLIPIDEMIA, UNSPECIFIED HYPERLIPIDEMIA TYPE: ICD-10-CM

## 2017-05-10 LAB
ALBUMIN SERPL-MCNC: 4.3 G/DL (ref 3.2–5.5)
ALBUMIN/GLOB SERPL: 1.4 G/DL (ref 1–3)
ALP SERPL-CCNC: 37 U/L (ref 15–121)
ALT SERPL W P-5'-P-CCNC: 13 U/L (ref 10–60)
ANION GAP SERPL CALCULATED.3IONS-SCNC: 7 MMOL/L (ref 5–15)
ARTICHOKE IGE QN: 93 MG/DL (ref 0–129)
AST SERPL-CCNC: 20 U/L (ref 10–60)
BASOPHILS # BLD AUTO: 0.03 10*3/MM3 (ref 0–0.2)
BASOPHILS NFR BLD AUTO: 0.3 % (ref 0–2)
BILIRUB SERPL-MCNC: 0.7 MG/DL (ref 0.2–1)
BUN BLD-MCNC: 26 MG/DL (ref 8–25)
BUN/CREAT SERPL: 26 (ref 7–25)
CALCIUM SPEC-SCNC: 9.7 MG/DL (ref 8.4–10.8)
CHLORIDE SERPL-SCNC: 103 MMOL/L (ref 100–112)
CO2 SERPL-SCNC: 28 MMOL/L (ref 20–32)
CREAT BLD-MCNC: 1 MG/DL (ref 0.4–1.3)
DEPRECATED RDW RBC AUTO: 46 FL (ref 36.4–46.3)
EOSINOPHIL # BLD AUTO: 0.16 10*3/MM3 (ref 0–0.7)
EOSINOPHIL NFR BLD AUTO: 1.7 % (ref 0–7)
ERYTHROCYTE [DISTWIDTH] IN BLOOD BY AUTOMATED COUNT: 13.6 % (ref 11.5–14.5)
GFR SERPL CREATININE-BSD FRML MDRD: 53 ML/MIN/1.73 (ref 39–90)
GLOBULIN UR ELPH-MCNC: 3.1 GM/DL (ref 2.5–4.6)
GLUCOSE BLD-MCNC: 103 MG/DL (ref 70–100)
HCT VFR BLD AUTO: 36.8 % (ref 35–45)
HGB BLD-MCNC: 12.3 G/DL (ref 12–15.5)
LYMPHOCYTES # BLD AUTO: 2.77 10*3/MM3 (ref 0.6–4.2)
LYMPHOCYTES NFR BLD AUTO: 28.8 % (ref 10–50)
MCH RBC QN AUTO: 32.1 PG (ref 26.5–34)
MCHC RBC AUTO-ENTMCNC: 33.4 G/DL (ref 31.4–36)
MCV RBC AUTO: 96.1 FL (ref 80–98)
MONOCYTES # BLD AUTO: 0.91 10*3/MM3 (ref 0–0.9)
MONOCYTES NFR BLD AUTO: 9.5 % (ref 0–12)
NEUTROPHILS # BLD AUTO: 5.75 10*3/MM3 (ref 2–8.6)
NEUTROPHILS NFR BLD AUTO: 59.7 % (ref 37–80)
PLATELET # BLD AUTO: 329 10*3/MM3 (ref 150–450)
PMV BLD AUTO: 9.1 FL (ref 8–12)
POTASSIUM BLD-SCNC: 4.8 MMOL/L (ref 3.4–5.4)
PROT SERPL-MCNC: 7.4 G/DL (ref 6.7–8.2)
RBC # BLD AUTO: 3.83 10*6/MM3 (ref 3.77–5.16)
SODIUM BLD-SCNC: 138 MMOL/L (ref 134–146)
WBC NRBC COR # BLD: 9.62 10*3/MM3 (ref 3.2–9.8)

## 2017-05-10 PROCEDURE — 80053 COMPREHEN METABOLIC PANEL: CPT | Performed by: FAMILY MEDICINE

## 2017-05-10 PROCEDURE — 85025 COMPLETE CBC W/AUTO DIFF WBC: CPT | Performed by: FAMILY MEDICINE

## 2017-05-10 PROCEDURE — 36415 COLL VENOUS BLD VENIPUNCTURE: CPT | Performed by: FAMILY MEDICINE

## 2017-05-10 PROCEDURE — 83721 ASSAY OF BLOOD LIPOPROTEIN: CPT | Performed by: FAMILY MEDICINE

## 2017-05-10 PROCEDURE — 83036 HEMOGLOBIN GLYCOSYLATED A1C: CPT | Performed by: FAMILY MEDICINE

## 2017-05-10 PROCEDURE — 82607 VITAMIN B-12: CPT | Performed by: FAMILY MEDICINE

## 2017-05-11 LAB
HBA1C MFR BLD: 5.62 % (ref 4–5.6)
VIT B12 BLD-MCNC: 427 PG/ML (ref 239–931)

## 2017-05-17 ENCOUNTER — OFFICE VISIT (OUTPATIENT)
Dept: FAMILY MEDICINE CLINIC | Facility: CLINIC | Age: 82
End: 2017-05-17

## 2017-05-17 VITALS
TEMPERATURE: 97.6 F | HEIGHT: 65 IN | HEART RATE: 80 BPM | BODY MASS INDEX: 24.66 KG/M2 | SYSTOLIC BLOOD PRESSURE: 132 MMHG | OXYGEN SATURATION: 95 % | WEIGHT: 148 LBS | RESPIRATION RATE: 16 BRPM | DIASTOLIC BLOOD PRESSURE: 58 MMHG

## 2017-05-17 DIAGNOSIS — E53.8 B12 DEFICIENCY: ICD-10-CM

## 2017-05-17 DIAGNOSIS — M15.9 GENERALIZED OA: ICD-10-CM

## 2017-05-17 DIAGNOSIS — E78.01 FAMILIAL HYPERCHOLESTEROLEMIA: Primary | ICD-10-CM

## 2017-05-17 DIAGNOSIS — R73.9 HYPERGLYCEMIA: ICD-10-CM

## 2017-05-17 PROCEDURE — 99214 OFFICE O/P EST MOD 30 MIN: CPT | Performed by: FAMILY MEDICINE

## 2017-05-31 ENCOUNTER — CLINICAL SUPPORT (OUTPATIENT)
Dept: FAMILY MEDICINE CLINIC | Facility: CLINIC | Age: 82
End: 2017-05-31

## 2017-05-31 DIAGNOSIS — D51.0 PERNICIOUS ANEMIA: Primary | ICD-10-CM

## 2017-05-31 PROCEDURE — 96372 THER/PROPH/DIAG INJ SC/IM: CPT | Performed by: FAMILY MEDICINE

## 2017-05-31 RX ADMIN — CYANOCOBALAMIN 1000 MCG: 1000 INJECTION, SOLUTION INTRAMUSCULAR; SUBCUTANEOUS at 08:35

## 2017-06-27 RX ORDER — DOCUSATE SODIUM 100 MG/1
CAPSULE ORAL
Qty: 60 CAPSULE | Refills: 0 | Status: SHIPPED | OUTPATIENT
Start: 2017-06-27 | End: 2017-06-30

## 2017-06-30 ENCOUNTER — OFFICE VISIT (OUTPATIENT)
Dept: FAMILY MEDICINE CLINIC | Facility: CLINIC | Age: 82
End: 2017-06-30

## 2017-06-30 VITALS
DIASTOLIC BLOOD PRESSURE: 70 MMHG | WEIGHT: 137.6 LBS | RESPIRATION RATE: 14 BRPM | SYSTOLIC BLOOD PRESSURE: 122 MMHG | BODY MASS INDEX: 21.6 KG/M2 | HEIGHT: 67 IN | HEART RATE: 69 BPM | OXYGEN SATURATION: 95 % | TEMPERATURE: 97.5 F

## 2017-06-30 DIAGNOSIS — M15.9 GENERALIZED OA: ICD-10-CM

## 2017-06-30 DIAGNOSIS — M06.332: Primary | ICD-10-CM

## 2017-06-30 PROCEDURE — 96372 THER/PROPH/DIAG INJ SC/IM: CPT | Performed by: FAMILY MEDICINE

## 2017-06-30 PROCEDURE — 99213 OFFICE O/P EST LOW 20 MIN: CPT | Performed by: FAMILY MEDICINE

## 2017-06-30 RX ORDER — METHYLPREDNISOLONE ACETATE 80 MG/ML
80 INJECTION, SUSPENSION INTRA-ARTICULAR; INTRALESIONAL; INTRAMUSCULAR; SOFT TISSUE ONCE
Status: COMPLETED | OUTPATIENT
Start: 2017-06-30 | End: 2017-06-30

## 2017-06-30 RX ADMIN — METHYLPREDNISOLONE ACETATE 80 MG: 80 INJECTION, SUSPENSION INTRA-ARTICULAR; INTRALESIONAL; INTRAMUSCULAR; SOFT TISSUE at 14:25

## 2017-06-30 NOTE — PROGRESS NOTES
Subjective   Monserrat Downs is a 87 y.o. female.   Chief Complaint   Patient presents with   • Cyst     left wrist       History of Present Illness   Patient with history of osteoarthritis is in today for evaluation.  She has had not that has come up on her left wrist and concerned may be a cyst.  There has been some increased pain and swelling    The following portions of the patient's history were reviewed and updated as appropriate: allergies, current medications, past family history, past medical history, past social history, past surgical history and problem list.    Review of Systems   Constitutional: Positive for fatigue.   HENT: Negative.    Eyes: Negative.    Respiratory: Negative.    Cardiovascular: Negative.    Gastrointestinal: Negative.    Endocrine: Negative.    Genitourinary: Negative.    Musculoskeletal: Positive for arthralgias and back pain.   Skin: Negative.    Allergic/Immunologic: Negative.    Neurological: Negative.    Hematological: Negative.    Psychiatric/Behavioral: Negative.    All other systems reviewed and are negative.      Objective   Physical Exam   Constitutional: She is oriented to person, place, and time. She appears well-developed and well-nourished.   HENT:   Head: Normocephalic and atraumatic.   Right Ear: External ear normal.   Left Ear: External ear normal.   Nose: Nose normal.   Mouth/Throat: Oropharynx is clear and moist.   Eyes: Conjunctivae and EOM are normal. Pupils are equal, round, and reactive to light.   Neck: Normal range of motion. Neck supple.   Cardiovascular: Normal rate, regular rhythm, normal heart sounds and intact distal pulses.  Exam reveals no gallop and no friction rub.    No murmur heard.  Pulmonary/Chest: Effort normal and breath sounds normal. She has no wheezes. She has no rales.   Abdominal: Soft. Bowel sounds are normal. She exhibits no mass. There is no tenderness. There is no rebound and no guarding.   Musculoskeletal:        Left wrist: She exhibits  decreased range of motion, tenderness and deformity.        Right hip: She exhibits decreased range of motion and tenderness.        Left hip: She exhibits decreased range of motion and tenderness.        Right knee: She exhibits decreased range of motion. Tenderness found.        Left knee: She exhibits decreased range of motion. Tenderness found.        Lumbar back: She exhibits decreased range of motion and tenderness.   Rheumatoid nodule measuring 2.2 cm ventral aspect left wrist.  scoliosis   Neurological: She is alert and oriented to person, place, and time. She has normal reflexes. No cranial nerve deficit. She exhibits normal muscle tone.   Skin: Skin is warm and dry. No rash noted.   Psychiatric: She has a normal mood and affect. Her behavior is normal. Judgment and thought content normal.   Nursing note and vitals reviewed.      Assessment/Plan   Monserrat was seen today for cyst.    Diagnoses and all orders for this visit:    Rheumatoid nodule of wrist, left    Generalized OA  -     methylPREDNISolone acetate (DEPO-medrol) injection 80 mg; Inject 1 mL into the shoulder, thigh, or buttocks 1 (One) Time.        Continue current therapies

## 2017-07-24 RX ORDER — CELECOXIB 200 MG/1
CAPSULE ORAL
Qty: 90 CAPSULE | Refills: 1 | Status: SHIPPED | OUTPATIENT
Start: 2017-07-24 | End: 2018-01-19 | Stop reason: SDUPTHER

## 2017-07-31 ENCOUNTER — CLINICAL SUPPORT (OUTPATIENT)
Dept: FAMILY MEDICINE CLINIC | Facility: CLINIC | Age: 82
End: 2017-07-31

## 2017-07-31 DIAGNOSIS — D51.0 PERNICIOUS ANEMIA: Primary | ICD-10-CM

## 2017-07-31 PROCEDURE — 96372 THER/PROPH/DIAG INJ SC/IM: CPT | Performed by: FAMILY MEDICINE

## 2017-07-31 RX ADMIN — CYANOCOBALAMIN 1000 MCG: 1000 INJECTION, SOLUTION INTRAMUSCULAR; SUBCUTANEOUS at 10:28

## 2017-08-18 RX ORDER — ATORVASTATIN CALCIUM 10 MG/1
TABLET, FILM COATED ORAL
Qty: 90 TABLET | Refills: 1 | Status: SHIPPED | OUTPATIENT
Start: 2017-08-18 | End: 2018-02-14 | Stop reason: SDUPTHER

## 2017-08-29 ENCOUNTER — CLINICAL SUPPORT (OUTPATIENT)
Dept: FAMILY MEDICINE CLINIC | Facility: CLINIC | Age: 82
End: 2017-08-29

## 2017-08-29 DIAGNOSIS — D51.0 PERNICIOUS ANEMIA: Primary | ICD-10-CM

## 2017-08-29 PROCEDURE — 96372 THER/PROPH/DIAG INJ SC/IM: CPT | Performed by: FAMILY MEDICINE

## 2017-08-29 RX ADMIN — CYANOCOBALAMIN 1000 MCG: 1000 INJECTION, SOLUTION INTRAMUSCULAR; SUBCUTANEOUS at 15:37

## 2017-09-08 ENCOUNTER — OFFICE VISIT (OUTPATIENT)
Dept: FAMILY MEDICINE CLINIC | Facility: CLINIC | Age: 82
End: 2017-09-08

## 2017-09-08 VITALS
WEIGHT: 136.6 LBS | HEART RATE: 67 BPM | OXYGEN SATURATION: 95 % | TEMPERATURE: 97.4 F | HEIGHT: 65 IN | RESPIRATION RATE: 14 BRPM | SYSTOLIC BLOOD PRESSURE: 132 MMHG | BODY MASS INDEX: 22.76 KG/M2 | DIASTOLIC BLOOD PRESSURE: 70 MMHG

## 2017-09-08 DIAGNOSIS — M17.0 PRIMARY OSTEOARTHRITIS OF BOTH KNEES: ICD-10-CM

## 2017-09-08 DIAGNOSIS — M41.20 SCOLIOSIS (AND KYPHOSCOLIOSIS), IDIOPATHIC: Primary | ICD-10-CM

## 2017-09-08 DIAGNOSIS — M54.5 CHRONIC LOW BACK PAIN, UNSPECIFIED BACK PAIN LATERALITY, WITH SCIATICA PRESENCE UNSPECIFIED: ICD-10-CM

## 2017-09-08 DIAGNOSIS — G89.29 CHRONIC LOW BACK PAIN, UNSPECIFIED BACK PAIN LATERALITY, WITH SCIATICA PRESENCE UNSPECIFIED: ICD-10-CM

## 2017-09-08 PROCEDURE — 96372 THER/PROPH/DIAG INJ SC/IM: CPT | Performed by: FAMILY MEDICINE

## 2017-09-08 PROCEDURE — 99213 OFFICE O/P EST LOW 20 MIN: CPT | Performed by: FAMILY MEDICINE

## 2017-09-08 RX ORDER — METHYLPREDNISOLONE 4 MG/1
TABLET ORAL
Qty: 21 TABLET | Refills: 0 | Status: SHIPPED | OUTPATIENT
Start: 2017-09-08 | End: 2017-10-13

## 2017-09-08 RX ORDER — METHYLPREDNISOLONE ACETATE 80 MG/ML
80 INJECTION, SUSPENSION INTRA-ARTICULAR; INTRALESIONAL; INTRAMUSCULAR; SOFT TISSUE ONCE
Status: COMPLETED | OUTPATIENT
Start: 2017-09-08 | End: 2017-09-08

## 2017-09-08 RX ADMIN — METHYLPREDNISOLONE ACETATE 80 MG: 80 INJECTION, SUSPENSION INTRA-ARTICULAR; INTRALESIONAL; INTRAMUSCULAR; SOFT TISSUE at 13:48

## 2017-09-08 NOTE — PROGRESS NOTES
Subjective   Monserrat Downs is a 87 y.o. female.   Chief Complaint   Patient presents with   • Osteoarthritis     patient wanting a shot, having pain in left hip, back and knees       Osteoarthritis   This is a chronic problem. The current episode started more than 1 year ago. The problem occurs daily. The problem has been gradually worsening. Associated symptoms include arthralgias, fatigue and numbness. Associated symptoms comments: Back pain. The symptoms are aggravated by bending, exertion, walking and standing. She has tried NSAIDs for the symptoms. The treatment provided mild relief.        The following portions of the patient's history were reviewed and updated as appropriate: allergies, current medications, past family history, past medical history, past social history, past surgical history and problem list.    Review of Systems   Constitutional: Positive for fatigue.   HENT: Negative.    Eyes: Negative.    Respiratory: Negative.    Cardiovascular: Negative.    Gastrointestinal: Negative.    Endocrine: Negative.    Genitourinary: Negative.    Musculoskeletal: Positive for arthralgias.   Skin: Negative.    Allergic/Immunologic: Negative.    Neurological: Positive for numbness.   Hematological: Negative.    Psychiatric/Behavioral: Negative.    All other systems reviewed and are negative.      Objective   Physical Exam   Constitutional: She is oriented to person, place, and time. She appears well-developed and well-nourished.   HENT:   Head: Normocephalic and atraumatic.   Right Ear: External ear normal.   Left Ear: External ear normal.   Nose: Nose normal.   Mouth/Throat: Oropharynx is clear and moist.   Eyes: Conjunctivae and EOM are normal. Pupils are equal, round, and reactive to light.   Neck: Normal range of motion. Neck supple.   Cardiovascular: Normal rate, regular rhythm, normal heart sounds and intact distal pulses.  Exam reveals no gallop and no friction rub.    No murmur heard.  Pulmonary/Chest:  Effort normal and breath sounds normal. She has no wheezes. She has no rales.   Abdominal: Soft. Bowel sounds are normal. She exhibits no mass. There is no tenderness. There is no rebound and no guarding.   Musculoskeletal:        Left wrist: She exhibits decreased range of motion, tenderness and deformity.        Right hip: She exhibits decreased range of motion and tenderness.        Left hip: She exhibits decreased range of motion and tenderness.        Right knee: She exhibits decreased range of motion. Tenderness found.        Left knee: She exhibits decreased range of motion. Tenderness found.        Lumbar back: She exhibits decreased range of motion and tenderness.   Rheumatoid nodule measuring 2.2 cm ventral aspect left wrist.  scoliosis   Neurological: She is alert and oriented to person, place, and time. She has normal reflexes. No cranial nerve deficit. She exhibits normal muscle tone.   Skin: Skin is warm and dry. No rash noted.   Psychiatric: She has a normal mood and affect. Her behavior is normal. Judgment and thought content normal.   Nursing note and vitals reviewed.      Assessment/Plan   Monserrat was seen today for osteoarthritis.    Diagnoses and all orders for this visit:    Scoliosis (and kyphoscoliosis), idiopathic    Primary osteoarthritis of both knees  -     ketorolac (TORADOL) injection 60 mg; Inject 2 mL into the shoulder, thigh, or buttocks 1 (One) Time.  -     methylPREDNISolone acetate (DEPO-medrol) injection 80 mg; Inject 1 mL into the shoulder, thigh, or buttocks 1 (One) Time.    Chronic low back pain, unspecified back pain laterality, with sciatica presence unspecified  -     ketorolac (TORADOL) injection 60 mg; Inject 2 mL into the shoulder, thigh, or buttocks 1 (One) Time.  -     methylPREDNISolone acetate (DEPO-medrol) injection 80 mg; Inject 1 mL into the shoulder, thigh, or buttocks 1 (One) Time.    Other orders  -     MethylPREDNISolone (MEDROL, EMILE,) 4 MG tablet; Take as  directed on package instructions.

## 2017-09-28 ENCOUNTER — CLINICAL SUPPORT (OUTPATIENT)
Dept: FAMILY MEDICINE CLINIC | Facility: CLINIC | Age: 82
End: 2017-09-28

## 2017-09-28 DIAGNOSIS — D51.0 PERNICIOUS ANEMIA: Primary | ICD-10-CM

## 2017-09-28 PROCEDURE — 96372 THER/PROPH/DIAG INJ SC/IM: CPT | Performed by: FAMILY MEDICINE

## 2017-10-13 ENCOUNTER — OFFICE VISIT (OUTPATIENT)
Dept: FAMILY MEDICINE CLINIC | Facility: CLINIC | Age: 82
End: 2017-10-13

## 2017-10-13 VITALS
HEIGHT: 65 IN | SYSTOLIC BLOOD PRESSURE: 136 MMHG | WEIGHT: 135 LBS | RESPIRATION RATE: 16 BRPM | TEMPERATURE: 97.8 F | BODY MASS INDEX: 22.49 KG/M2 | OXYGEN SATURATION: 97 % | HEART RATE: 97 BPM | DIASTOLIC BLOOD PRESSURE: 70 MMHG

## 2017-10-13 DIAGNOSIS — M15.9 GENERALIZED OA: Primary | ICD-10-CM

## 2017-10-13 DIAGNOSIS — M54.5 CHRONIC LOW BACK PAIN, UNSPECIFIED BACK PAIN LATERALITY, WITH SCIATICA PRESENCE UNSPECIFIED: ICD-10-CM

## 2017-10-13 DIAGNOSIS — M41.20 SCOLIOSIS (AND KYPHOSCOLIOSIS), IDIOPATHIC: ICD-10-CM

## 2017-10-13 DIAGNOSIS — G89.29 CHRONIC LOW BACK PAIN, UNSPECIFIED BACK PAIN LATERALITY, WITH SCIATICA PRESENCE UNSPECIFIED: ICD-10-CM

## 2017-10-13 PROCEDURE — 99213 OFFICE O/P EST LOW 20 MIN: CPT | Performed by: FAMILY MEDICINE

## 2017-10-13 RX ORDER — TRAMADOL HYDROCHLORIDE 50 MG/1
50 TABLET ORAL EVERY 6 HOURS PRN
Qty: 360 TABLET | Refills: 0 | Status: SHIPPED | OUTPATIENT
Start: 2017-10-13 | End: 2018-10-05 | Stop reason: SDUPTHER

## 2017-10-13 RX ORDER — TRAMADOL HYDROCHLORIDE 50 MG/1
50 TABLET ORAL EVERY 6 HOURS PRN
Qty: 120 TABLET | Refills: 0 | Status: SHIPPED | OUTPATIENT
Start: 2017-10-13 | End: 2017-10-13 | Stop reason: SDUPTHER

## 2017-10-13 NOTE — PROGRESS NOTES
Subjective   Monserrat Downs is a 87 y.o. female.   Chief Complaint   Patient presents with   • Arthritis     lower left back and hip       Arthritis   Presents for follow-up visit. She complains of pain, stiffness and joint swelling. The symptoms have been worsening. Affected locations include the right hip, left hip, left knee and right knee. Her pain is at a severity of 6/10. Associated symptoms include fatigue. Compliance with medications is %. Treatment side effects: constipation.   Back Pain   This is a chronic problem. The current episode started more than 1 year ago. The problem occurs constantly. The problem has been gradually worsening since onset. The pain is present in the lumbar spine. The quality of the pain is described as aching. The pain is at a severity of 5/10. The pain is moderate. The pain is worse during the day. The symptoms are aggravated by bending, standing and twisting. Stiffness is present all day. Associated symptoms include numbness.        The following portions of the patient's history were reviewed and updated as appropriate: allergies, current medications, past family history, past medical history, past social history, past surgical history and problem list.    Review of Systems   Constitutional: Positive for fatigue.   HENT: Negative.    Eyes: Negative.    Respiratory: Negative.    Cardiovascular: Negative.    Gastrointestinal: Negative.    Endocrine: Negative.    Genitourinary: Negative.    Musculoskeletal: Positive for arthralgias, arthritis, back pain, joint swelling and stiffness.   Skin: Negative.    Allergic/Immunologic: Negative.    Neurological: Positive for numbness.   Hematological: Negative.    Psychiatric/Behavioral: Negative.    All other systems reviewed and are negative.      Objective   Physical Exam   Constitutional: She is oriented to person, place, and time. She appears well-developed and well-nourished.   HENT:   Head: Normocephalic and atraumatic.   Right Ear:  External ear normal.   Left Ear: External ear normal.   Nose: Nose normal.   Mouth/Throat: Oropharynx is clear and moist.   Eyes: Conjunctivae and EOM are normal. Pupils are equal, round, and reactive to light.   Neck: Normal range of motion. Neck supple.   Cardiovascular: Normal rate, regular rhythm, normal heart sounds and intact distal pulses.  Exam reveals no gallop and no friction rub.    No murmur heard.  Pulmonary/Chest: Effort normal and breath sounds normal. She has no wheezes. She has no rales.   Abdominal: Soft. Bowel sounds are normal. She exhibits no mass. There is no tenderness. There is no rebound and no guarding.   Musculoskeletal:        Left wrist: She exhibits decreased range of motion, tenderness and deformity.        Right hip: She exhibits decreased range of motion and tenderness.        Left hip: She exhibits decreased range of motion and tenderness.        Right knee: She exhibits decreased range of motion. Tenderness found.        Left knee: She exhibits decreased range of motion. Tenderness found.        Lumbar back: She exhibits decreased range of motion and tenderness.   Rheumatoid nodule measuring 2.2 cm ventral aspect left wrist.  scoliosis   Neurological: She is alert and oriented to person, place, and time. She has normal reflexes. No cranial nerve deficit. She exhibits normal muscle tone.   Skin: Skin is warm and dry. No rash noted.   Psychiatric: She has a normal mood and affect. Her behavior is normal. Judgment and thought content normal.   Nursing note and vitals reviewed.  Patrice reviewed    Assessment/Plan   Monserrat was seen today for arthritis.    Diagnoses and all orders for this visit:    Generalized OA    Chronic low back pain, unspecified back pain laterality, with sciatica presence unspecified    Scoliosis (and kyphoscoliosis), idiopathic

## 2017-10-20 RX ADMIN — CYANOCOBALAMIN 1000 MCG: 1000 INJECTION, SOLUTION INTRAMUSCULAR; SUBCUTANEOUS at 19:19

## 2017-11-13 ENCOUNTER — LAB (OUTPATIENT)
Dept: LAB | Facility: OTHER | Age: 82
End: 2017-11-13

## 2017-11-13 DIAGNOSIS — E53.8 B12 DEFICIENCY: ICD-10-CM

## 2017-11-13 DIAGNOSIS — E78.01 FAMILIAL HYPERCHOLESTEROLEMIA: ICD-10-CM

## 2017-11-13 DIAGNOSIS — R73.9 HYPERGLYCEMIA: ICD-10-CM

## 2017-11-13 LAB
ALBUMIN SERPL-MCNC: 4.3 G/DL (ref 3.2–5.5)
ALBUMIN/GLOB SERPL: 1.3 G/DL (ref 1–3)
ALP SERPL-CCNC: 51 U/L (ref 15–121)
ALT SERPL W P-5'-P-CCNC: 11 U/L (ref 10–60)
ANION GAP SERPL CALCULATED.3IONS-SCNC: 11 MMOL/L (ref 5–15)
ARTICHOKE IGE QN: 94 MG/DL (ref 0–129)
AST SERPL-CCNC: 21 U/L (ref 10–60)
BASOPHILS # BLD AUTO: 0.04 10*3/MM3 (ref 0–0.2)
BASOPHILS NFR BLD AUTO: 0.5 % (ref 0–2)
BILIRUB SERPL-MCNC: 0.9 MG/DL (ref 0.2–1)
BUN BLD-MCNC: 18 MG/DL (ref 8–25)
BUN/CREAT SERPL: 22.5 (ref 7–25)
CALCIUM SPEC-SCNC: 9.7 MG/DL (ref 8.4–10.8)
CHLORIDE SERPL-SCNC: 104 MMOL/L (ref 100–112)
CO2 SERPL-SCNC: 28 MMOL/L (ref 20–32)
CREAT BLD-MCNC: 0.8 MG/DL (ref 0.4–1.3)
DEPRECATED RDW RBC AUTO: 47.8 FL (ref 36.4–46.3)
EOSINOPHIL # BLD AUTO: 0.21 10*3/MM3 (ref 0–0.7)
EOSINOPHIL NFR BLD AUTO: 2.4 % (ref 0–7)
ERYTHROCYTE [DISTWIDTH] IN BLOOD BY AUTOMATED COUNT: 13.7 % (ref 11.5–14.5)
GFR SERPL CREATININE-BSD FRML MDRD: 68 ML/MIN/1.73 (ref 39–90)
GLOBULIN UR ELPH-MCNC: 3.4 GM/DL (ref 2.5–4.6)
GLUCOSE BLD-MCNC: 97 MG/DL (ref 70–100)
HCT VFR BLD AUTO: 39.3 % (ref 35–45)
HGB BLD-MCNC: 12.5 G/DL (ref 12–15.5)
LYMPHOCYTES # BLD AUTO: 1.92 10*3/MM3 (ref 0.6–4.2)
LYMPHOCYTES NFR BLD AUTO: 21.8 % (ref 10–50)
MCH RBC QN AUTO: 31.4 PG (ref 26.5–34)
MCHC RBC AUTO-ENTMCNC: 31.8 G/DL (ref 31.4–36)
MCV RBC AUTO: 98.7 FL (ref 80–98)
MONOCYTES # BLD AUTO: 1.02 10*3/MM3 (ref 0–0.9)
MONOCYTES NFR BLD AUTO: 11.6 % (ref 0–12)
NEUTROPHILS # BLD AUTO: 5.62 10*3/MM3 (ref 2–8.6)
NEUTROPHILS NFR BLD AUTO: 63.7 % (ref 37–80)
PLATELET # BLD AUTO: 397 10*3/MM3 (ref 150–450)
PMV BLD AUTO: 9.3 FL (ref 8–12)
POTASSIUM BLD-SCNC: 4.6 MMOL/L (ref 3.4–5.4)
PROT SERPL-MCNC: 7.7 G/DL (ref 6.7–8.2)
RBC # BLD AUTO: 3.98 10*6/MM3 (ref 3.77–5.16)
SODIUM BLD-SCNC: 143 MMOL/L (ref 134–146)
WBC NRBC COR # BLD: 8.81 10*3/MM3 (ref 3.2–9.8)

## 2017-11-13 PROCEDURE — 85025 COMPLETE CBC W/AUTO DIFF WBC: CPT | Performed by: FAMILY MEDICINE

## 2017-11-13 PROCEDURE — 83721 ASSAY OF BLOOD LIPOPROTEIN: CPT | Performed by: FAMILY MEDICINE

## 2017-11-13 PROCEDURE — 83036 HEMOGLOBIN GLYCOSYLATED A1C: CPT | Performed by: FAMILY MEDICINE

## 2017-11-13 PROCEDURE — 80053 COMPREHEN METABOLIC PANEL: CPT | Performed by: FAMILY MEDICINE

## 2017-11-13 PROCEDURE — 36415 COLL VENOUS BLD VENIPUNCTURE: CPT | Performed by: FAMILY MEDICINE

## 2017-11-14 LAB — HBA1C MFR BLD: 5.6 % (ref 4–5.6)

## 2017-11-20 ENCOUNTER — OFFICE VISIT (OUTPATIENT)
Dept: FAMILY MEDICINE CLINIC | Facility: CLINIC | Age: 82
End: 2017-11-20

## 2017-11-20 VITALS
TEMPERATURE: 97.3 F | WEIGHT: 135 LBS | RESPIRATION RATE: 16 BRPM | SYSTOLIC BLOOD PRESSURE: 110 MMHG | BODY MASS INDEX: 22.49 KG/M2 | DIASTOLIC BLOOD PRESSURE: 60 MMHG | HEIGHT: 65 IN | HEART RATE: 75 BPM

## 2017-11-20 DIAGNOSIS — R73.9 HYPERGLYCEMIA: ICD-10-CM

## 2017-11-20 DIAGNOSIS — E78.01 FAMILIAL HYPERCHOLESTEROLEMIA: Primary | ICD-10-CM

## 2017-11-20 DIAGNOSIS — M15.9 GENERALIZED OA: ICD-10-CM

## 2017-11-20 PROCEDURE — 99214 OFFICE O/P EST MOD 30 MIN: CPT | Performed by: FAMILY MEDICINE

## 2017-11-20 NOTE — PROGRESS NOTES
Subjective   Monserrat Downs is a 87 y.o. female who presents to the office for follow-up and review of labs.     History of Present Illness   Patient with history of  hyperlipidemia, hyperglycemia, and osteoarthritis in today for reevaluation and review labs.  Patient continues to complain of multiple joint pains which do show improvement with taking tramadol.  Patient denies any chest pain, PND, orthopnea.  She has had no neurological symptoms.    The following portions of the patient's history were reviewed and updated as appropriate: allergies, current medications, past family history, past medical history, past social history, past surgical history and problem list.    Review of Systems   Constitutional: Positive for fatigue.   HENT: Negative.    Eyes: Negative.    Respiratory: Negative.    Cardiovascular: Negative.    Gastrointestinal: Negative.    Endocrine: Negative.    Genitourinary: Negative.    Musculoskeletal: Positive for arthralgias, back pain, gait problem and myalgias.   Skin: Negative.    Allergic/Immunologic: Negative.    Hematological: Negative.    Psychiatric/Behavioral: Negative.    All other systems reviewed and are negative.      Objective   Physical Exam   Constitutional: She is oriented to person, place, and time. She appears well-developed and well-nourished.   HENT:   Head: Normocephalic and atraumatic.   Right Ear: External ear normal.   Left Ear: External ear normal.   Nose: Nose normal.   Mouth/Throat: Oropharynx is clear and moist.   Eyes: Conjunctivae and EOM are normal. Pupils are equal, round, and reactive to light.   Neck: Normal range of motion. Neck supple.   Cardiovascular: Normal rate, regular rhythm, normal heart sounds and intact distal pulses.  Exam reveals no gallop and no friction rub.    No murmur heard.  Pulmonary/Chest: Effort normal and breath sounds normal. She has no wheezes. She has no rales.   Abdominal: Soft. Bowel sounds are normal. She exhibits no mass. There is no  tenderness. There is no rebound and no guarding.   Musculoskeletal:        Left wrist: She exhibits decreased range of motion, tenderness and deformity.        Right hip: She exhibits decreased range of motion and tenderness.        Left hip: She exhibits decreased range of motion and tenderness.        Right knee: She exhibits decreased range of motion. Tenderness found.        Left knee: She exhibits decreased range of motion. Tenderness found.        Lumbar back: She exhibits decreased range of motion and tenderness.   Rheumatoid nodule measuring 2.2 cm ventral aspect left wrist.  scoliosis   Neurological: She is alert and oriented to person, place, and time. She has normal reflexes. No cranial nerve deficit. She exhibits normal muscle tone.   Skin: Skin is warm and dry. No rash noted.   Psychiatric: She has a normal mood and affect. Her behavior is normal. Judgment and thought content normal.   Nursing note and vitals reviewed.      Assessment/Plan   Monserrat was seen today for hyperlipidemia.    Diagnoses and all orders for this visit:    Familial hypercholesterolemia  -     Comprehensive Metabolic Panel; Future  -     LDL Cholesterol, Direct; Future    Generalized OA    Hyperglycemia  -     Hemoglobin A1c; Future    Continue current therapies     Labs are reviewed with patient.    PHQ-2/PHQ-9 Depression Screening 5/17/2017   Little interest or pleasure in doing things 0   Feeling down, depressed, or hopeless 0   Trouble falling or staying asleep, or sleeping too much 1   Feeling tired or having little energy 1   Poor appetite or overeating 0   Feeling bad about yourself - or that you are a failure or have let yourself or your family down 0   Trouble concentrating on things, such as reading the newspaper or watching television 0   Moving or speaking so slowly that other people could have noticed. Or the opposite - being so fidgety or restless that you have been moving around a lot more than usual 0   Thoughts that  you would be better off dead, or of hurting yourself in some way 0   Total Score 2   If you checked off any problems, how difficult have these problems made it for you to do your work, take care of things at home, or get along with other people? Not difficult at all         Lab on 11/13/2017   Component Date Value Ref Range Status   • Glucose 11/13/2017 97  70 - 100 mg/dL Final   • BUN 11/13/2017 18  8 - 25 mg/dL Final   • Creatinine 11/13/2017 0.80  0.40 - 1.30 mg/dL Final   • Sodium 11/13/2017 143  134 - 146 mmol/L Final   • Potassium 11/13/2017 4.6  3.4 - 5.4 mmol/L Final   • Chloride 11/13/2017 104  100 - 112 mmol/L Final   • CO2 11/13/2017 28.0  20.0 - 32.0 mmol/L Final   • Calcium 11/13/2017 9.7  8.4 - 10.8 mg/dL Final   • Total Protein 11/13/2017 7.7  6.7 - 8.2 g/dL Final   • Albumin 11/13/2017 4.30  3.20 - 5.50 g/dL Final   • ALT (SGPT) 11/13/2017 11  10 - 60 U/L Final   • AST (SGOT) 11/13/2017 21  10 - 60 U/L Final   • Alkaline Phosphatase 11/13/2017 51  15 - 121 U/L Final   • Total Bilirubin 11/13/2017 0.9  0.2 - 1.0 mg/dL Final   • eGFR Non  Amer 11/13/2017 68  39 - 90 mL/min/1.73 Final   • Globulin 11/13/2017 3.4  2.5 - 4.6 gm/dL Final   • A/G Ratio 11/13/2017 1.3  1.0 - 3.0 g/dL Final   • BUN/Creatinine Ratio 11/13/2017 22.5  7.0 - 25.0 Final   • Anion Gap 11/13/2017 11.0  5.0 - 15.0 mmol/L Final   • Hemoglobin A1C 11/13/2017 5.6  4 - 5.6 % Final   • LDL Cholesterol  11/13/2017 94  0 - 129 mg/dL Final   • WBC 11/13/2017 8.81  3.20 - 9.80 10*3/mm3 Final   • RBC 11/13/2017 3.98  3.77 - 5.16 10*6/mm3 Final   • Hemoglobin 11/13/2017 12.5  12.0 - 15.5 g/dL Final   • Hematocrit 11/13/2017 39.3  35.0 - 45.0 % Final   • MCV 11/13/2017 98.7* 80.0 - 98.0 fL Final   • MCH 11/13/2017 31.4  26.5 - 34.0 pg Final   • MCHC 11/13/2017 31.8  31.4 - 36.0 g/dL Final   • RDW 11/13/2017 13.7  11.5 - 14.5 % Final   • RDW-SD 11/13/2017 47.8* 36.4 - 46.3 fl Final   • MPV 11/13/2017 9.3  8.0 - 12.0 fL Final   • Platelets  11/13/2017 397  150 - 450 10*3/mm3 Final   • Neutrophil % 11/13/2017 63.7  37.0 - 80.0 % Final   • Lymphocyte % 11/13/2017 21.8  10.0 - 50.0 % Final   • Monocyte % 11/13/2017 11.6  0.0 - 12.0 % Final   • Eosinophil % 11/13/2017 2.4  0.0 - 7.0 % Final   • Basophil % 11/13/2017 0.5  0.0 - 2.0 % Final   • Neutrophils, Absolute 11/13/2017 5.62  2.00 - 8.60 10*3/mm3 Final   • Lymphocytes, Absolute 11/13/2017 1.92  0.60 - 4.20 10*3/mm3 Final   • Monocytes, Absolute 11/13/2017 1.02* 0.00 - 0.90 10*3/mm3 Final   • Eosinophils, Absolute 11/13/2017 0.21  0.00 - 0.70 10*3/mm3 Final   • Basophils, Absolute 11/13/2017 0.04  0.00 - 0.20 10*3/mm3 Final   ]

## 2018-01-22 RX ORDER — CELECOXIB 200 MG/1
CAPSULE ORAL
Qty: 90 CAPSULE | Refills: 1 | Status: SHIPPED | OUTPATIENT
Start: 2018-01-22 | End: 2018-07-18 | Stop reason: SDUPTHER

## 2018-02-07 ENCOUNTER — OFFICE VISIT (OUTPATIENT)
Dept: FAMILY MEDICINE CLINIC | Facility: CLINIC | Age: 83
End: 2018-02-07

## 2018-02-07 VITALS
SYSTOLIC BLOOD PRESSURE: 120 MMHG | RESPIRATION RATE: 14 BRPM | WEIGHT: 123.8 LBS | DIASTOLIC BLOOD PRESSURE: 60 MMHG | OXYGEN SATURATION: 93 % | BODY MASS INDEX: 20.62 KG/M2 | HEIGHT: 65 IN | HEART RATE: 90 BPM | TEMPERATURE: 97.7 F

## 2018-02-07 DIAGNOSIS — M15.9 GENERALIZED OA: Primary | ICD-10-CM

## 2018-02-07 DIAGNOSIS — M41.20 SCOLIOSIS (AND KYPHOSCOLIOSIS), IDIOPATHIC: ICD-10-CM

## 2018-02-07 DIAGNOSIS — M70.61 TROCHANTERIC BURSITIS OF RIGHT HIP: ICD-10-CM

## 2018-02-07 PROCEDURE — 96372 THER/PROPH/DIAG INJ SC/IM: CPT | Performed by: FAMILY MEDICINE

## 2018-02-07 PROCEDURE — 99213 OFFICE O/P EST LOW 20 MIN: CPT | Performed by: FAMILY MEDICINE

## 2018-02-07 RX ORDER — METHYLPREDNISOLONE ACETATE 80 MG/ML
80 INJECTION, SUSPENSION INTRA-ARTICULAR; INTRALESIONAL; INTRAMUSCULAR; SOFT TISSUE ONCE
Status: COMPLETED | OUTPATIENT
Start: 2018-02-07 | End: 2018-02-07

## 2018-02-07 RX ADMIN — METHYLPREDNISOLONE ACETATE 80 MG: 80 INJECTION, SUSPENSION INTRA-ARTICULAR; INTRALESIONAL; INTRAMUSCULAR; SOFT TISSUE at 14:37

## 2018-02-07 NOTE — PROGRESS NOTES
Subjective   Monserrat Downs is a 87 y.o. female.   Chief Complaint   Patient presents with   • Arthritis       Arthritis   Presents for follow-up visit. She complains of pain, stiffness and joint swelling. The symptoms have been worsening. Affected locations include the neck, left shoulder, right shoulder, left elbow, right elbow, right wrist, left wrist, left hip, right knee, left knee and right hip. Her pain is at a severity of 5/10. Associated symptoms include fatigue and pain at night. Compliance with total regimen is %. Compliance with medications is %.        The following portions of the patient's history were reviewed and updated as appropriate: allergies, current medications, past family history, past medical history, past social history, past surgical history and problem list.    Review of Systems   Constitutional: Positive for fatigue.   HENT: Negative.    Eyes: Negative.    Respiratory: Negative.    Cardiovascular: Negative.    Gastrointestinal: Negative.    Endocrine: Negative.    Genitourinary: Negative.    Musculoskeletal: Positive for arthralgias, arthritis, back pain, gait problem, joint swelling, myalgias and stiffness.   Skin: Negative.    Allergic/Immunologic: Negative.    Hematological: Negative.    Psychiatric/Behavioral: Negative.    All other systems reviewed and are negative.      Objective   Physical Exam   Constitutional: She is oriented to person, place, and time. She appears well-developed and well-nourished.   HENT:   Head: Normocephalic and atraumatic.   Right Ear: External ear normal.   Left Ear: External ear normal.   Nose: Nose normal.   Mouth/Throat: Oropharynx is clear and moist.   Eyes: Conjunctivae and EOM are normal. Pupils are equal, round, and reactive to light.   Neck: Normal range of motion. Neck supple.   Cardiovascular: Normal rate, regular rhythm, normal heart sounds and intact distal pulses.  Exam reveals no gallop and no friction rub.    No murmur  heard.  Pulmonary/Chest: Effort normal and breath sounds normal. She has no wheezes. She has no rales.   Abdominal: Soft. Bowel sounds are normal. She exhibits no mass. There is no tenderness. There is no rebound and no guarding.   Musculoskeletal:        Left wrist: She exhibits decreased range of motion, tenderness and deformity.        Right hip: She exhibits decreased range of motion and tenderness.        Left hip: She exhibits decreased range of motion and tenderness.        Right knee: She exhibits decreased range of motion. Tenderness found.        Left knee: She exhibits decreased range of motion. Tenderness found.        Lumbar back: She exhibits decreased range of motion and tenderness.   Rheumatoid nodule measuring 2.2 cm ventral aspect left wrist.  scoliosis   Neurological: She is alert and oriented to person, place, and time. She has normal reflexes. No cranial nerve deficit. She exhibits normal muscle tone.   Skin: Skin is warm and dry. No rash noted.   Psychiatric: She has a normal mood and affect. Her behavior is normal. Judgment and thought content normal.   Nursing note and vitals reviewed.      Assessment/Plan   Monserrat was seen today for arthritis.    Diagnoses and all orders for this visit:    Generalized OA  -     ketorolac (TORADOL) injection 60 mg; Inject 2 mL into the shoulder, thigh, or buttocks 1 (One) Time.  -     methylPREDNISolone acetate (DEPO-medrol) injection 80 mg; Inject 1 mL into the shoulder, thigh, or buttocks 1 (One) Time.    Scoliosis (and kyphoscoliosis), idiopathic    Trochanteric bursitis of right hip

## 2018-02-14 RX ORDER — ATORVASTATIN CALCIUM 10 MG/1
TABLET, FILM COATED ORAL
Qty: 90 TABLET | Refills: 1 | Status: SHIPPED | OUTPATIENT
Start: 2018-02-14 | End: 2018-08-12 | Stop reason: SDUPTHER

## 2018-02-26 ENCOUNTER — CLINICAL SUPPORT (OUTPATIENT)
Dept: FAMILY MEDICINE CLINIC | Facility: CLINIC | Age: 83
End: 2018-02-26

## 2018-02-26 DIAGNOSIS — E53.8 B12 DEFICIENCY: Primary | ICD-10-CM

## 2018-02-26 PROCEDURE — 96372 THER/PROPH/DIAG INJ SC/IM: CPT | Performed by: FAMILY MEDICINE

## 2018-02-26 RX ADMIN — CYANOCOBALAMIN 1000 MCG: 1000 INJECTION, SOLUTION INTRAMUSCULAR; SUBCUTANEOUS at 11:50

## 2018-05-29 ENCOUNTER — LAB (OUTPATIENT)
Dept: LAB | Facility: OTHER | Age: 83
End: 2018-05-29

## 2018-05-29 DIAGNOSIS — R73.9 HYPERGLYCEMIA: ICD-10-CM

## 2018-05-29 DIAGNOSIS — E78.01 FAMILIAL HYPERCHOLESTEROLEMIA: ICD-10-CM

## 2018-05-29 LAB
ALBUMIN SERPL-MCNC: 3.9 G/DL (ref 3.2–5.5)
ALBUMIN/GLOB SERPL: 1.1 G/DL (ref 1–3)
ALP SERPL-CCNC: 54 U/L (ref 15–121)
ALT SERPL W P-5'-P-CCNC: 11 U/L (ref 10–60)
ANION GAP SERPL CALCULATED.3IONS-SCNC: 8 MMOL/L (ref 5–15)
ARTICHOKE IGE QN: 80 MG/DL (ref 0–129)
AST SERPL-CCNC: 22 U/L (ref 10–60)
BILIRUB SERPL-MCNC: 0.5 MG/DL (ref 0.2–1)
BUN BLD-MCNC: 24 MG/DL (ref 8–25)
BUN/CREAT SERPL: 26.7 (ref 7–25)
CALCIUM SPEC-SCNC: 9.4 MG/DL (ref 8.4–10.8)
CHLORIDE SERPL-SCNC: 102 MMOL/L (ref 100–112)
CO2 SERPL-SCNC: 28 MMOL/L (ref 20–32)
CREAT BLD-MCNC: 0.9 MG/DL (ref 0.4–1.3)
GFR SERPL CREATININE-BSD FRML MDRD: 59 ML/MIN/1.73 (ref 39–90)
GLOBULIN UR ELPH-MCNC: 3.6 GM/DL (ref 2.5–4.6)
GLUCOSE BLD-MCNC: 97 MG/DL (ref 70–100)
HBA1C MFR BLD: 5.5 % (ref 4–5.6)
POTASSIUM BLD-SCNC: 4.4 MMOL/L (ref 3.4–5.4)
PROT SERPL-MCNC: 7.5 G/DL (ref 6.7–8.2)
SODIUM BLD-SCNC: 138 MMOL/L (ref 134–146)

## 2018-05-29 PROCEDURE — 80053 COMPREHEN METABOLIC PANEL: CPT | Performed by: FAMILY MEDICINE

## 2018-05-29 PROCEDURE — 36415 COLL VENOUS BLD VENIPUNCTURE: CPT | Performed by: FAMILY MEDICINE

## 2018-05-29 PROCEDURE — 83721 ASSAY OF BLOOD LIPOPROTEIN: CPT | Performed by: FAMILY MEDICINE

## 2018-05-29 PROCEDURE — 83036 HEMOGLOBIN GLYCOSYLATED A1C: CPT | Performed by: FAMILY MEDICINE

## 2018-06-12 ENCOUNTER — OFFICE VISIT (OUTPATIENT)
Dept: FAMILY MEDICINE CLINIC | Facility: CLINIC | Age: 83
End: 2018-06-12

## 2018-06-12 VITALS
HEART RATE: 76 BPM | SYSTOLIC BLOOD PRESSURE: 128 MMHG | DIASTOLIC BLOOD PRESSURE: 68 MMHG | BODY MASS INDEX: 19.36 KG/M2 | TEMPERATURE: 98.1 F | HEIGHT: 65 IN | WEIGHT: 116.2 LBS | OXYGEN SATURATION: 87 %

## 2018-06-12 DIAGNOSIS — G89.29 CHRONIC LOW BACK PAIN, UNSPECIFIED BACK PAIN LATERALITY, WITH SCIATICA PRESENCE UNSPECIFIED: ICD-10-CM

## 2018-06-12 DIAGNOSIS — M54.5 CHRONIC LOW BACK PAIN, UNSPECIFIED BACK PAIN LATERALITY, WITH SCIATICA PRESENCE UNSPECIFIED: ICD-10-CM

## 2018-06-12 DIAGNOSIS — E78.01 FAMILIAL HYPERCHOLESTEROLEMIA: Primary | ICD-10-CM

## 2018-06-12 DIAGNOSIS — R73.9 HYPERGLYCEMIA: ICD-10-CM

## 2018-06-12 DIAGNOSIS — M41.20 SCOLIOSIS (AND KYPHOSCOLIOSIS), IDIOPATHIC: ICD-10-CM

## 2018-06-12 DIAGNOSIS — M15.9 GENERALIZED OA: ICD-10-CM

## 2018-06-12 PROCEDURE — 99214 OFFICE O/P EST MOD 30 MIN: CPT | Performed by: FAMILY MEDICINE

## 2018-06-12 PROCEDURE — 96372 THER/PROPH/DIAG INJ SC/IM: CPT | Performed by: FAMILY MEDICINE

## 2018-06-12 RX ORDER — KETOROLAC TROMETHAMINE 30 MG/ML
60 INJECTION, SOLUTION INTRAMUSCULAR; INTRAVENOUS ONCE
Status: COMPLETED | OUTPATIENT
Start: 2018-06-12 | End: 2018-06-12

## 2018-06-12 RX ORDER — METHYLPREDNISOLONE ACETATE 80 MG/ML
80 INJECTION, SUSPENSION INTRA-ARTICULAR; INTRALESIONAL; INTRAMUSCULAR; SOFT TISSUE ONCE
Status: COMPLETED | OUTPATIENT
Start: 2018-06-12 | End: 2018-06-12

## 2018-06-12 RX ADMIN — METHYLPREDNISOLONE ACETATE 80 MG: 80 INJECTION, SUSPENSION INTRA-ARTICULAR; INTRALESIONAL; INTRAMUSCULAR; SOFT TISSUE at 14:21

## 2018-06-12 RX ADMIN — KETOROLAC TROMETHAMINE 60 MG: 30 INJECTION, SOLUTION INTRAMUSCULAR; INTRAVENOUS at 14:18

## 2018-06-12 NOTE — PROGRESS NOTES
Subjective   Monserrat Downs is a 87 y.o. female who presents to the office for follow-up and review of labs.     History of Present Illness   Patient with history of chronic osteoarthritis, hyperlipidemia, and hyperglycemia is in today for reevaluation and to review labs.  Patient is been very stressed with rapid decline in her 's health.  Recently hospitalized and now has been sent home with hospice.  She has been complaining of increasing back and knee pain especially with him being at the hospital.    The following portions of the patient's history were reviewed and updated as appropriate: allergies, current medications, past family history, past medical history, past social history, past surgical history and problem list.    Review of Systems   Constitutional: Positive for activity change and fatigue.   HENT: Negative.    Eyes: Negative.    Respiratory: Negative.    Cardiovascular: Negative.    Gastrointestinal: Negative.    Endocrine: Negative.    Genitourinary: Negative.    Musculoskeletal: Positive for arthralgias, back pain, gait problem and myalgias.   Skin: Negative.    Allergic/Immunologic: Negative.    Hematological: Negative.    Psychiatric/Behavioral: Negative.    All other systems reviewed and are negative.      Objective   Physical Exam   Constitutional: She is oriented to person, place, and time. She appears well-developed and well-nourished.   HENT:   Head: Normocephalic and atraumatic.   Right Ear: External ear normal.   Left Ear: External ear normal.   Nose: Nose normal.   Mouth/Throat: Oropharynx is clear and moist.   Eyes: Conjunctivae and EOM are normal. Pupils are equal, round, and reactive to light.   Neck: Normal range of motion. Neck supple.   Cardiovascular: Normal rate, regular rhythm, normal heart sounds and intact distal pulses.  Exam reveals no gallop and no friction rub.    No murmur heard.  Pulmonary/Chest: Effort normal and breath sounds normal. She has no wheezes. She has no  rales.   Abdominal: Soft. Bowel sounds are normal. She exhibits no mass. There is no tenderness. There is no rebound and no guarding.   Musculoskeletal:        Left wrist: She exhibits decreased range of motion, tenderness and deformity.        Right hip: She exhibits decreased range of motion and tenderness.        Left hip: She exhibits decreased range of motion and tenderness.        Right knee: She exhibits decreased range of motion. Tenderness found.        Left knee: She exhibits decreased range of motion. Tenderness found.        Lumbar back: She exhibits decreased range of motion and tenderness.   Rheumatoid nodule measuring 2.2 cm ventral aspect left wrist.  scoliosis   Neurological: She is alert and oriented to person, place, and time. She has normal reflexes. No cranial nerve deficit. She exhibits normal muscle tone.   Skin: Skin is warm and dry. No rash noted.   Psychiatric: She has a normal mood and affect. Her behavior is normal. Judgment and thought content normal.   Nursing note and vitals reviewed.      Assessment/Plan   Monserrat was seen today for hyperlipidemia, hyperglycemia and back pain.    Diagnoses and all orders for this visit:    Familial hypercholesterolemia    Generalized OA  -     methylPREDNISolone acetate (DEPO-medrol) injection 80 mg; Inject 1 mL into the shoulder, thigh, or buttocks 1 (One) Time.  -     ketorolac (TORADOL) injection 60 mg; Inject 60 mg into the shoulder, thigh, or buttocks 1 (One) Time.    Scoliosis (and kyphoscoliosis), idiopathic    Chronic low back pain, unspecified back pain laterality, with sciatica presence unspecified    Hyperglycemia    Very concerned about the 19 pound weight loss since November 2017.  Recommended nutritional supplements in the form of ensure or boost.     Labs are reviewed with patient.    PHQ-2/PHQ-9 Depression Screening 5/17/2017   Little interest or pleasure in doing things 0   Feeling down, depressed, or hopeless 0   Trouble falling or  staying asleep, or sleeping too much 1   Feeling tired or having little energy 1   Poor appetite or overeating 0   Feeling bad about yourself - or that you are a failure or have let yourself or your family down 0   Trouble concentrating on things, such as reading the newspaper or watching television 0   Moving or speaking so slowly that other people could have noticed. Or the opposite - being so fidgety or restless that you have been moving around a lot more than usual 0   Thoughts that you would be better off dead, or of hurting yourself in some way 0   Total Score 2   If you checked off any problems, how difficult have these problems made it for you to do your work, take care of things at home, or get along with other people? Not difficult at all         Lab on 05/29/2018   Component Date Value Ref Range Status   • Glucose 05/29/2018 97  70 - 100 mg/dL Final   • BUN 05/29/2018 24  8 - 25 mg/dL Final   • Creatinine 05/29/2018 0.90  0.40 - 1.30 mg/dL Final   • Sodium 05/29/2018 138  134 - 146 mmol/L Final   • Potassium 05/29/2018 4.4  3.4 - 5.4 mmol/L Final   • Chloride 05/29/2018 102  100 - 112 mmol/L Final   • CO2 05/29/2018 28.0  20.0 - 32.0 mmol/L Final   • Calcium 05/29/2018 9.4  8.4 - 10.8 mg/dL Final   • Total Protein 05/29/2018 7.5  6.7 - 8.2 g/dL Final   • Albumin 05/29/2018 3.90  3.20 - 5.50 g/dL Final   • ALT (SGPT) 05/29/2018 11  10 - 60 U/L Final   • AST (SGOT) 05/29/2018 22  10 - 60 U/L Final   • Alkaline Phosphatase 05/29/2018 54  15 - 121 U/L Final   • Total Bilirubin 05/29/2018 0.5  0.2 - 1.0 mg/dL Final   • eGFR Non African Amer 05/29/2018 59  39 - 90 mL/min/1.73 Final   • Globulin 05/29/2018 3.6  2.5 - 4.6 gm/dL Final   • A/G Ratio 05/29/2018 1.1  1.0 - 3.0 g/dL Final   • BUN/Creatinine Ratio 05/29/2018 26.7* 7.0 - 25.0 Final   • Anion Gap 05/29/2018 8.0  5.0 - 15.0 mmol/L Final   • Hemoglobin A1C 05/29/2018 5.5  4 - 5.6 % Final   • LDL Cholesterol  05/29/2018 80  0 - 129 mg/dL Final   ]

## 2018-07-24 ENCOUNTER — OFFICE VISIT (OUTPATIENT)
Dept: FAMILY MEDICINE CLINIC | Facility: CLINIC | Age: 83
End: 2018-07-24

## 2018-07-24 VITALS
BODY MASS INDEX: 18.49 KG/M2 | DIASTOLIC BLOOD PRESSURE: 60 MMHG | SYSTOLIC BLOOD PRESSURE: 130 MMHG | WEIGHT: 111 LBS | OXYGEN SATURATION: 91 % | HEART RATE: 75 BPM | HEIGHT: 65 IN | TEMPERATURE: 98.5 F

## 2018-07-24 DIAGNOSIS — J01.90 ACUTE NON-RECURRENT SINUSITIS, UNSPECIFIED LOCATION: Primary | ICD-10-CM

## 2018-07-24 PROCEDURE — 99213 OFFICE O/P EST LOW 20 MIN: CPT | Performed by: FAMILY MEDICINE

## 2018-07-24 PROCEDURE — 96372 THER/PROPH/DIAG INJ SC/IM: CPT | Performed by: FAMILY MEDICINE

## 2018-07-24 RX ORDER — DEXTROMETHORPHAN HYDROBROMIDE AND PROMETHAZINE HYDROCHLORIDE 15; 6.25 MG/5ML; MG/5ML
5 SYRUP ORAL 4 TIMES DAILY PRN
Qty: 180 ML | Refills: 0 | Status: SHIPPED | OUTPATIENT
Start: 2018-07-24 | End: 2019-01-08

## 2018-07-24 RX ORDER — CELECOXIB 200 MG/1
CAPSULE ORAL
Qty: 90 CAPSULE | Refills: 1 | Status: SHIPPED | OUTPATIENT
Start: 2018-07-24 | End: 2019-07-08 | Stop reason: SDUPTHER

## 2018-07-24 RX ORDER — METHYLPREDNISOLONE ACETATE 80 MG/ML
80 INJECTION, SUSPENSION INTRA-ARTICULAR; INTRALESIONAL; INTRAMUSCULAR; SOFT TISSUE ONCE
Status: COMPLETED | OUTPATIENT
Start: 2018-07-24 | End: 2018-07-24

## 2018-07-24 RX ORDER — AZITHROMYCIN 250 MG/1
TABLET, FILM COATED ORAL
Qty: 6 TABLET | Refills: 0 | Status: SHIPPED | OUTPATIENT
Start: 2018-07-24 | End: 2018-08-22

## 2018-07-24 RX ADMIN — METHYLPREDNISOLONE ACETATE 80 MG: 80 INJECTION, SUSPENSION INTRA-ARTICULAR; INTRALESIONAL; INTRAMUSCULAR; SOFT TISSUE at 14:07

## 2018-07-24 NOTE — PROGRESS NOTES
"Sergio Downs is a 88 y.o. female.   Chief Complaint   Patient presents with   • Cough     \"hurts to take a deep breath\", x 2 weeks    • Nasal Congestion   '  Cough   This is a recurrent problem. The current episode started 1 to 4 weeks ago. The problem has been waxing and waning. The problem occurs every few minutes. The cough is non-productive. Associated symptoms include myalgias, nasal congestion and a sore throat. Pertinent negatives include no chest pain, fever or wheezing.        The following portions of the patient's history were reviewed and updated as appropriate: allergies, current medications, past family history, past medical history, past social history, past surgical history and problem list.    Review of Systems   Constitutional: Positive for activity change and fatigue. Negative for fever.   HENT: Positive for sore throat.    Eyes: Negative.    Respiratory: Positive for cough. Negative for wheezing.    Cardiovascular: Negative.  Negative for chest pain.   Gastrointestinal: Negative.    Endocrine: Negative.    Genitourinary: Negative.    Musculoskeletal: Positive for arthralgias, back pain, gait problem and myalgias.   Skin: Negative.    Allergic/Immunologic: Negative.    Hematological: Negative.    Psychiatric/Behavioral: Negative.    All other systems reviewed and are negative.      Objective   Physical Exam   Constitutional: She is oriented to person, place, and time. She appears well-developed and well-nourished.   HENT:   Head: Normocephalic and atraumatic.   Right Ear: External ear normal. A middle ear effusion is present.   Left Ear: External ear normal. A middle ear effusion is present.   Nose: Mucosal edema and rhinorrhea present.   Mouth/Throat: Posterior oropharyngeal edema and posterior oropharyngeal erythema present.   Eyes: Pupils are equal, round, and reactive to light. Conjunctivae and EOM are normal.   Neck: Normal range of motion. Neck supple.   Cardiovascular: Normal " rate, regular rhythm, normal heart sounds and intact distal pulses.  Exam reveals no gallop and no friction rub.    No murmur heard.  Pulmonary/Chest: Effort normal. She has wheezes. She has no rales.   Abdominal: Soft. Bowel sounds are normal. She exhibits no mass. There is no tenderness. There is no rebound and no guarding.   Musculoskeletal:        Left wrist: She exhibits decreased range of motion, tenderness and deformity.        Right hip: She exhibits decreased range of motion and tenderness.        Left hip: She exhibits decreased range of motion and tenderness.        Right knee: She exhibits decreased range of motion. Tenderness found.        Left knee: She exhibits decreased range of motion. Tenderness found.        Lumbar back: She exhibits decreased range of motion and tenderness.   Rheumatoid nodule measuring 2.2 cm ventral aspect left wrist.  scoliosis   Neurological: She is alert and oriented to person, place, and time. She has normal reflexes. No cranial nerve deficit. She exhibits normal muscle tone.   Skin: Skin is warm and dry. No rash noted.   Psychiatric: She has a normal mood and affect. Her behavior is normal. Judgment and thought content normal.   Nursing note and vitals reviewed.      Assessment/Plan   Monserrat was seen today for cough and nasal congestion.    Diagnoses and all orders for this visit:    Acute non-recurrent sinusitis, unspecified location  -     methylPREDNISolone acetate (DEPO-medrol) injection 80 mg; Inject 1 mL into the appropriate muscle as directed by prescriber 1 (One) Time.    Other orders  -     azithromycin (ZITHROMAX Z-EMILE) 250 MG tablet; Take 2 tablets the first day, then 1 tablet daily for 4 days.  -     promethazine-dextromethorphan (PROMETHAZINE-DM) 6.25-15 MG/5ML syrup; Take 5 mL by mouth 4 (Four) Times a Day As Needed for Cough.

## 2018-08-13 RX ORDER — ATORVASTATIN CALCIUM 10 MG/1
TABLET, FILM COATED ORAL
Qty: 90 TABLET | Refills: 1 | Status: SHIPPED | OUTPATIENT
Start: 2018-08-13 | End: 2019-02-04 | Stop reason: SDUPTHER

## 2018-08-22 ENCOUNTER — OFFICE VISIT (OUTPATIENT)
Dept: FAMILY MEDICINE CLINIC | Facility: CLINIC | Age: 83
End: 2018-08-22

## 2018-08-22 VITALS
WEIGHT: 109 LBS | OXYGEN SATURATION: 96 % | TEMPERATURE: 98.1 F | DIASTOLIC BLOOD PRESSURE: 58 MMHG | HEIGHT: 65 IN | HEART RATE: 77 BPM | SYSTOLIC BLOOD PRESSURE: 118 MMHG | BODY MASS INDEX: 18.16 KG/M2

## 2018-08-22 DIAGNOSIS — R05.9 COUGH: Primary | ICD-10-CM

## 2018-08-22 DIAGNOSIS — M15.9 GENERALIZED OA: ICD-10-CM

## 2018-08-22 DIAGNOSIS — J84.10 FIBROTIC LUNG DISEASES (HCC): Chronic | ICD-10-CM

## 2018-08-22 PROCEDURE — 96372 THER/PROPH/DIAG INJ SC/IM: CPT | Performed by: FAMILY MEDICINE

## 2018-08-22 PROCEDURE — 99214 OFFICE O/P EST MOD 30 MIN: CPT | Performed by: FAMILY MEDICINE

## 2018-08-22 RX ORDER — METHYLPREDNISOLONE ACETATE 80 MG/ML
80 INJECTION, SUSPENSION INTRA-ARTICULAR; INTRALESIONAL; INTRAMUSCULAR; SOFT TISSUE ONCE
Status: COMPLETED | OUTPATIENT
Start: 2018-08-22 | End: 2018-08-22

## 2018-08-22 RX ORDER — OMEPRAZOLE 40 MG/1
40 CAPSULE, DELAYED RELEASE ORAL DAILY
COMMUNITY
End: 2018-08-22 | Stop reason: SDUPTHER

## 2018-08-22 RX ORDER — OMEPRAZOLE 40 MG/1
40 CAPSULE, DELAYED RELEASE ORAL DAILY
Qty: 90 CAPSULE | Refills: 2 | Status: SHIPPED | OUTPATIENT
Start: 2018-08-22 | End: 2020-06-08 | Stop reason: SDUPTHER

## 2018-08-22 RX ORDER — AZITHROMYCIN 250 MG/1
TABLET, FILM COATED ORAL
Qty: 6 TABLET | Refills: 0 | Status: SHIPPED | OUTPATIENT
Start: 2018-08-22 | End: 2018-09-17

## 2018-08-22 RX ORDER — ALBUTEROL SULFATE 90 UG/1
2 AEROSOL, METERED RESPIRATORY (INHALATION) EVERY 6 HOURS PRN
Qty: 18 G | Refills: 3 | Status: SHIPPED | OUTPATIENT
Start: 2018-08-22 | End: 2018-10-05 | Stop reason: SDUPTHER

## 2018-08-22 RX ADMIN — METHYLPREDNISOLONE ACETATE 80 MG: 80 INJECTION, SUSPENSION INTRA-ARTICULAR; INTRALESIONAL; INTRAMUSCULAR; SOFT TISSUE at 16:45

## 2018-08-22 NOTE — PROGRESS NOTES
"Sergio Downs is a 88 y.o. female.   Chief Complaint   Patient presents with   • Cough     \"slightly productive\"        Cough   This is a recurrent problem. The current episode started more than 1 month ago. The problem has been unchanged. The cough is non-productive. Associated symptoms include myalgias. Pertinent negatives include no chest pain, fever or wheezing. Nothing aggravates the symptoms. She has tried prescription cough suppressant for the symptoms. The treatment provided mild relief. Interstitial lung disease      Old chest x-rays have been reviewed.    The following portions of the patient's history were reviewed and updated as appropriate: allergies, current medications, past family history, past medical history, past social history, past surgical history and problem list.    Review of Systems   Constitutional: Positive for activity change and fatigue. Negative for fever.   Eyes: Negative.    Respiratory: Positive for cough. Negative for wheezing.    Cardiovascular: Negative.  Negative for chest pain.   Gastrointestinal: Negative.    Endocrine: Negative.    Genitourinary: Negative.    Musculoskeletal: Positive for arthralgias, back pain, gait problem and myalgias.   Skin: Negative.    Allergic/Immunologic: Negative.    Hematological: Negative.    Psychiatric/Behavioral: Negative.    All other systems reviewed and are negative.      Objective   Physical Exam   Constitutional: She is oriented to person, place, and time. She appears well-developed and well-nourished.   HENT:   Head: Normocephalic and atraumatic.   Right Ear: External ear normal.   Left Ear: External ear normal.   Nose: Mucosal edema present.   Eyes: Pupils are equal, round, and reactive to light. Conjunctivae and EOM are normal.   Neck: Normal range of motion. Neck supple.   Cardiovascular: Normal rate, regular rhythm, normal heart sounds and intact distal pulses.  Exam reveals no gallop and no friction rub.    No murmur " heard.  Pulmonary/Chest: Effort normal. She has wheezes. She has no rales.   Abdominal: Soft. Bowel sounds are normal. She exhibits no mass. There is no tenderness. There is no rebound and no guarding.   Musculoskeletal:        Left wrist: She exhibits decreased range of motion, tenderness and deformity.        Right hip: She exhibits decreased range of motion and tenderness.        Left hip: She exhibits decreased range of motion and tenderness.        Right knee: She exhibits decreased range of motion. Tenderness found.        Left knee: She exhibits decreased range of motion. Tenderness found.        Lumbar back: She exhibits decreased range of motion and tenderness.   Rheumatoid nodule measuring 2.2 cm ventral aspect left wrist.  scoliosis   Neurological: She is alert and oriented to person, place, and time. She has normal reflexes. No cranial nerve deficit. She exhibits normal muscle tone.   Skin: Skin is warm and dry. No rash noted.   Psychiatric: She has a normal mood and affect. Her behavior is normal. Judgment and thought content normal.   Nursing note and vitals reviewed.      Assessment/Plan   Monserrat was seen today for cough.    Diagnoses and all orders for this visit:    Cough  -     methylPREDNISolone acetate (DEPO-medrol) injection 80 mg; Inject 1 mL into the appropriate muscle as directed by prescriber 1 (One) Time.  -     XR Chest PA & Lateral; Future    Generalized OA    Fibrotic lung diseases (CMS/HCC)    Other orders  -     omeprazole (priLOSEC) 40 MG capsule; Take 1 capsule by mouth Daily.  -     azithromycin (ZITHROMAX Z-EMILE) 250 MG tablet; Take 2 tablets the first day, then 1 tablet daily for 4 days.  -     albuterol (PROVENTIL HFA;VENTOLIN HFA) 108 (90 Base) MCG/ACT inhaler; Inhale 2 puffs Every 6 (Six) Hours As Needed for Wheezing.

## 2018-09-11 ENCOUNTER — TELEPHONE (OUTPATIENT)
Dept: FAMILY MEDICINE CLINIC | Facility: CLINIC | Age: 83
End: 2018-09-11

## 2018-09-11 DIAGNOSIS — J44.9 CHRONIC OBSTRUCTIVE PULMONARY DISEASE, UNSPECIFIED COPD TYPE (HCC): Primary | ICD-10-CM

## 2018-09-11 NOTE — TELEPHONE ENCOUNTER
Advised of XR and entering PFT. Patient voiced understanding.       ----- Message from Mino Williamson MD sent at 8/28/2018  1:01 PM CDT -----  Regarding: RE: XR results   Contact: [phone]  Patient's x-ray show chronic COPD and interstitial lung disease.  Please schedule for pulmonary function test if not already done.  She will need repeat appointment after PFTs are completed.  ----- Message -----  From: Giulia Hyde MA  Sent: 8/28/2018  12:51 PM  To: Mino Williamson MD  Subject: XR results                                       XR results - please advise

## 2018-09-17 ENCOUNTER — OFFICE VISIT (OUTPATIENT)
Dept: FAMILY MEDICINE CLINIC | Facility: CLINIC | Age: 83
End: 2018-09-17

## 2018-09-17 VITALS
OXYGEN SATURATION: 96 % | TEMPERATURE: 99.9 F | HEART RATE: 84 BPM | BODY MASS INDEX: 19.09 KG/M2 | WEIGHT: 111.8 LBS | SYSTOLIC BLOOD PRESSURE: 120 MMHG | DIASTOLIC BLOOD PRESSURE: 54 MMHG | HEIGHT: 64 IN

## 2018-09-17 DIAGNOSIS — J84.10 FIBROTIC LUNG DISEASES (HCC): Primary | Chronic | ICD-10-CM

## 2018-09-17 DIAGNOSIS — Z86.000 H/O CARCINOMA IN SITU OF BREAST: ICD-10-CM

## 2018-09-17 PROCEDURE — 99214 OFFICE O/P EST MOD 30 MIN: CPT | Performed by: FAMILY MEDICINE

## 2018-09-17 NOTE — PROGRESS NOTES
Subjective   Monserrat Downs is a 88 y.o. female.   Chief Complaint   Patient presents with   • Follow-up     3 mo Breathing       Cough   This is a chronic problem. The current episode started more than 1 month ago. The problem has been gradually improving. The cough is non-productive. Associated symptoms include myalgias and shortness of breath. Pertinent negatives include no chest pain, fever or wheezing. She has tried a beta-agonist inhaler and oral steroids for the symptoms. The treatment provided moderate relief. Her past medical history is significant for COPD.      Chest x-ray showed interstitial lung disease and COPD.    The following portions of the patient's history were reviewed and updated as appropriate: allergies, current medications, past family history, past medical history, past social history, past surgical history and problem list.    Review of Systems   Constitutional: Positive for fatigue. Negative for fever.   HENT: Negative.    Eyes: Negative.    Respiratory: Positive for cough and shortness of breath. Negative for wheezing.    Cardiovascular: Negative.  Negative for chest pain.   Gastrointestinal: Negative.    Endocrine: Negative.    Genitourinary: Negative.    Musculoskeletal: Positive for arthralgias, back pain, gait problem and myalgias.   Skin: Negative.    Allergic/Immunologic: Negative.    Hematological: Negative.    Psychiatric/Behavioral: Negative.    All other systems reviewed and are negative.      Objective   Physical Exam   Constitutional: She is oriented to person, place, and time. She appears well-developed and well-nourished.   HENT:   Head: Normocephalic and atraumatic.   Right Ear: External ear normal.   Left Ear: External ear normal.   Nose: Mucosal edema present.   Eyes: Pupils are equal, round, and reactive to light. Conjunctivae and EOM are normal.   Neck: Normal range of motion. Neck supple.   Cardiovascular: Normal rate, regular rhythm, normal heart sounds and intact distal  pulses.  Exam reveals no gallop and no friction rub.    No murmur heard.  Pulmonary/Chest: Effort normal. She has decreased breath sounds. She has wheezes. She has no rales.   Abdominal: Soft. Bowel sounds are normal. She exhibits no mass. There is no tenderness. There is no rebound and no guarding.   Musculoskeletal:        Left wrist: She exhibits decreased range of motion, tenderness and deformity.        Right hip: She exhibits decreased range of motion and tenderness.        Left hip: She exhibits decreased range of motion and tenderness.        Right knee: She exhibits decreased range of motion. Tenderness found.        Left knee: She exhibits decreased range of motion. Tenderness found.        Lumbar back: She exhibits decreased range of motion and tenderness.   Rheumatoid nodule measuring 2.2 cm ventral aspect left wrist.  scoliosis   Neurological: She is alert and oriented to person, place, and time. She has normal reflexes. No cranial nerve deficit. She exhibits normal muscle tone.   Skin: Skin is warm and dry. No rash noted.   Psychiatric: She has a normal mood and affect. Her behavior is normal. Judgment and thought content normal.   Nursing note and vitals reviewed.      Assessment/Plan   Monserrat was seen today for follow-up.    Diagnoses and all orders for this visit:    Fibrotic lung diseases (CMS/HCC)  -     CT Chest With Contrast; Future  -     Basic Metabolic Panel    H/O carcinoma in situ of breast        Continue with current therapies.

## 2018-09-19 ENCOUNTER — TELEPHONE (OUTPATIENT)
Dept: FAMILY MEDICINE CLINIC | Facility: CLINIC | Age: 83
End: 2018-09-19

## 2018-09-19 LAB
ANION GAP SERPL CALCULATED.3IONS-SCNC: 5 MMOL/L (ref 5–15)
BUN BLD-MCNC: 16 MG/DL (ref 7–17)
BUN/CREAT SERPL: 19.8 (ref 7–25)
CALCIUM SPEC-SCNC: 9.6 MG/DL (ref 8.4–10.2)
CHLORIDE SERPL-SCNC: 104 MMOL/L (ref 98–107)
CO2 SERPL-SCNC: 29 MMOL/L (ref 22–30)
CREAT BLD-MCNC: 0.81 MG/DL (ref 0.52–1.04)
GFR SERPL CREATININE-BSD FRML MDRD: 67 ML/MIN/1.73 (ref 39–90)
GLUCOSE BLD-MCNC: 90 MG/DL (ref 74–99)
POTASSIUM BLD-SCNC: 4.8 MMOL/L (ref 3.4–5)
SODIUM BLD-SCNC: 138 MMOL/L (ref 137–145)

## 2018-09-19 PROCEDURE — 80048 BASIC METABOLIC PNL TOTAL CA: CPT | Performed by: FAMILY MEDICINE

## 2018-09-19 PROCEDURE — 36415 COLL VENOUS BLD VENIPUNCTURE: CPT | Performed by: FAMILY MEDICINE

## 2018-09-19 NOTE — TELEPHONE ENCOUNTER
Advised the patient the BMP lab was ok.       ----- Message from Mino Williamson MD sent at 9/19/2018 10:20 AM CDT -----  notify okay

## 2018-10-05 ENCOUNTER — OFFICE VISIT (OUTPATIENT)
Dept: FAMILY MEDICINE CLINIC | Facility: CLINIC | Age: 83
End: 2018-10-05

## 2018-10-05 VITALS
BODY MASS INDEX: 18.61 KG/M2 | HEIGHT: 64 IN | OXYGEN SATURATION: 95 % | DIASTOLIC BLOOD PRESSURE: 50 MMHG | TEMPERATURE: 97.5 F | WEIGHT: 109 LBS | HEART RATE: 75 BPM | SYSTOLIC BLOOD PRESSURE: 114 MMHG

## 2018-10-05 DIAGNOSIS — J43.1 PANLOBULAR EMPHYSEMA (HCC): ICD-10-CM

## 2018-10-05 DIAGNOSIS — Z23 NEED FOR VACCINATION: Primary | ICD-10-CM

## 2018-10-05 DIAGNOSIS — J84.10 FIBROTIC LUNG DISEASES (HCC): Primary | Chronic | ICD-10-CM

## 2018-10-05 DIAGNOSIS — M15.9 GENERALIZED OA: ICD-10-CM

## 2018-10-05 PROCEDURE — 99214 OFFICE O/P EST MOD 30 MIN: CPT | Performed by: FAMILY MEDICINE

## 2018-10-05 PROCEDURE — 90662 IIV NO PRSV INCREASED AG IM: CPT | Performed by: FAMILY MEDICINE

## 2018-10-05 PROCEDURE — G0008 ADMIN INFLUENZA VIRUS VAC: HCPCS | Performed by: FAMILY MEDICINE

## 2018-10-05 RX ORDER — TRAMADOL HYDROCHLORIDE 50 MG/1
50 TABLET ORAL EVERY 6 HOURS PRN
Qty: 360 TABLET | Refills: 0 | Status: SHIPPED | OUTPATIENT
Start: 2018-10-05 | End: 2019-02-04 | Stop reason: SDUPTHER

## 2018-10-05 RX ORDER — ALBUTEROL SULFATE 90 UG/1
2 AEROSOL, METERED RESPIRATORY (INHALATION) EVERY 6 HOURS PRN
Qty: 3 INHALER | Refills: 3 | Status: SHIPPED | OUTPATIENT
Start: 2018-10-05 | End: 2019-01-08 | Stop reason: SDUPTHER

## 2018-10-05 NOTE — PROGRESS NOTES
Subjective   Monserrat Downs is a 88 y.o. female.   Chief Complaint   Patient presents with   • fibrotic lung disease     review PFT and CT    • Back Pain     refill Ultram        History of Present Illness   Patient with history of chronic cough is in today for reevaluation and to review her test.  CT shows chronic emphysematous changes consistent with COPD with apical scarring.  Repeat CT to rule out neoplasm was recommended for 6 months.  PFTs could not be completed secondary to poor effort.    The following portions of the patient's history were reviewed and updated as appropriate: allergies, current medications, past family history, past medical history, past social history, past surgical history and problem list.    Review of Systems   Constitutional: Positive for fatigue. Negative for fever.   HENT: Negative.    Eyes: Negative.    Respiratory: Positive for cough and shortness of breath. Negative for wheezing.    Cardiovascular: Negative.  Negative for chest pain.   Gastrointestinal: Negative.    Endocrine: Negative.    Genitourinary: Negative.    Musculoskeletal: Positive for arthralgias, back pain, gait problem and myalgias.   Skin: Negative.    Allergic/Immunologic: Negative.    Hematological: Negative.    Psychiatric/Behavioral: Negative.    All other systems reviewed and are negative.      Objective   Physical Exam   Constitutional: She is oriented to person, place, and time. She appears well-developed and well-nourished.   HENT:   Head: Normocephalic and atraumatic.   Right Ear: External ear normal.   Left Ear: External ear normal.   Nose: Mucosal edema present.   Eyes: Pupils are equal, round, and reactive to light. Conjunctivae and EOM are normal.   Neck: Normal range of motion. Neck supple.   Cardiovascular: Normal rate, regular rhythm, normal heart sounds and intact distal pulses.  Exam reveals no gallop and no friction rub.    No murmur heard.  Pulmonary/Chest: Effort normal. She has decreased breath  sounds. She has wheezes. She has no rales.   Abdominal: Soft. Bowel sounds are normal. She exhibits no mass. There is no tenderness. There is no rebound and no guarding.   Musculoskeletal:        Left wrist: She exhibits decreased range of motion, tenderness and deformity.        Right hip: She exhibits decreased range of motion and tenderness.        Left hip: She exhibits decreased range of motion and tenderness.        Right knee: She exhibits decreased range of motion. Tenderness found.        Left knee: She exhibits decreased range of motion. Tenderness found.        Lumbar back: She exhibits decreased range of motion and tenderness.   Rheumatoid nodule measuring 2.2 cm ventral aspect left wrist.  scoliosis   Neurological: She is alert and oriented to person, place, and time. She has normal reflexes. No cranial nerve deficit. She exhibits normal muscle tone.   Skin: Skin is warm and dry. No rash noted.   Psychiatric: She has a normal mood and affect. Her behavior is normal. Judgment and thought content normal.   Nursing note and vitals reviewed.    Patrice reviewed      Assessment/Plan   Monserrat was seen today for fibrotic lung disease and back pain.    Diagnoses and all orders for this visit:    Fibrotic lung diseases (CMS/HCC)  -     CT Chest With Contrast; Future    Panlobular emphysema (CMS/HCC)  -     CT Chest With Contrast; Future    Generalized OA    Other orders  -     traMADol (ULTRAM) 50 MG tablet; Take 1 tablet by mouth Every 6 (Six) Hours As Needed for Moderate Pain .  -     tiotropium (SPIRIVA HANDIHALER) 18 MCG per inhalation capsule; Place 1 capsule into inhaler and inhale Daily.  -     albuterol (PROVENTIL HFA;VENTOLIN HFA) 108 (90 Base) MCG/ACT inhaler; Inhale 2 puffs Every 6 (Six) Hours As Needed for Wheezing.      Patient will receive both flu vaccine and Prevnar

## 2019-01-08 ENCOUNTER — OFFICE VISIT (OUTPATIENT)
Dept: FAMILY MEDICINE CLINIC | Facility: CLINIC | Age: 84
End: 2019-01-08

## 2019-01-08 VITALS
HEIGHT: 64 IN | BODY MASS INDEX: 19.12 KG/M2 | SYSTOLIC BLOOD PRESSURE: 114 MMHG | DIASTOLIC BLOOD PRESSURE: 60 MMHG | HEART RATE: 78 BPM | WEIGHT: 112 LBS

## 2019-01-08 DIAGNOSIS — I65.29 STENOSIS OF CAROTID ARTERY, UNSPECIFIED LATERALITY: ICD-10-CM

## 2019-01-08 DIAGNOSIS — Z00.00 MEDICARE ANNUAL WELLNESS VISIT, INITIAL: Primary | ICD-10-CM

## 2019-01-08 DIAGNOSIS — I10 BENIGN ESSENTIAL HYPERTENSION: ICD-10-CM

## 2019-01-08 DIAGNOSIS — J84.10 FIBROTIC LUNG DISEASES (HCC): Chronic | ICD-10-CM

## 2019-01-08 DIAGNOSIS — R73.9 HYPERGLYCEMIA: ICD-10-CM

## 2019-01-08 DIAGNOSIS — E78.01 FAMILIAL HYPERCHOLESTEROLEMIA: ICD-10-CM

## 2019-01-08 DIAGNOSIS — M15.9 GENERALIZED OA: ICD-10-CM

## 2019-01-08 DIAGNOSIS — E53.8 B12 DEFICIENCY: ICD-10-CM

## 2019-01-08 PROCEDURE — 99214 OFFICE O/P EST MOD 30 MIN: CPT | Performed by: NURSE PRACTITIONER

## 2019-01-08 PROCEDURE — G0438 PPPS, INITIAL VISIT: HCPCS | Performed by: NURSE PRACTITIONER

## 2019-01-08 RX ORDER — ALBUTEROL SULFATE 90 UG/1
2 AEROSOL, METERED RESPIRATORY (INHALATION) EVERY 6 HOURS PRN
Qty: 3 INHALER | Refills: 3 | Status: SHIPPED | OUTPATIENT
Start: 2019-01-08 | End: 2019-11-22 | Stop reason: SDUPTHER

## 2019-01-08 RX ORDER — CYANOCOBALAMIN (VITAMIN B-12) 1000 MCG
TABLET ORAL
COMMUNITY
End: 2021-02-02

## 2019-01-08 NOTE — PROGRESS NOTES
QUICK REFERENCE INFORMATION:  The ABCs of the Annual Wellness Visit    Initial Medicare Wellness Visit    HEALTH RISK ASSESSMENT    6/30/1930    Recent Hospitalizations:  No hospitalization(s) within the last year..        Current Medical Providers:  Patient Care Team:  Ivon Evans APRN as PCP - General (Family Medicine)        Smoking Status:  Social History     Tobacco Use   Smoking Status Never Smoker   Smokeless Tobacco Never Used       Alcohol Consumption:  Social History     Substance and Sexual Activity   Alcohol Use No       Depression Screen:   PHQ-2/PHQ-9 Depression Screening 1/8/2019   Little interest or pleasure in doing things 0   Feeling down, depressed, or hopeless 0   Trouble falling or staying asleep, or sleeping too much 0   Feeling tired or having little energy 0   Poor appetite or overeating 0   Feeling bad about yourself - or that you are a failure or have let yourself or your family down 0   Trouble concentrating on things, such as reading the newspaper or watching television 0   Moving or speaking so slowly that other people could have noticed. Or the opposite - being so fidgety or restless that you have been moving around a lot more than usual 0   Thoughts that you would be better off dead, or of hurting yourself in some way 0   Total Score 0   If you checked off any problems, how difficult have these problems made it for you to do your work, take care of things at home, or get along with other people? -       Health Habits and Functional and Cognitive Screening:  Functional & Cognitive Status 1/8/2019   Do you have difficulty preparing food and eating? No   Do you have difficulty bathing yourself, getting dressed or grooming yourself? No   Do you have difficulty using the toilet? No   Do you have difficulty moving around from place to place? No   Do you have trouble with steps or getting out of a bed or a chair? No   In the past year have you fallen or experienced a near fall? No    Current Diet Well Balanced Diet   Dental Exam Up to date   Eye Exam Up to date   Exercise (times per week) 3 times per week   Current Exercise Activities Include none   Do you need help using the phone?  No   Are you deaf or do you have serious difficulty hearing?  No   Do you need help with transportation? No   Do you need help shopping? No   Do you need help preparing meals?  No   Do you need help with housework?  No   Do you need help with laundry? No   Do you need help taking your medications? No   Do you need help managing money? No   Do you ever drive or ride in a car without wearing a seat belt? No   Have you felt unusual stress, anger or loneliness in the last month? Yes   Who do you live with? Alone   If you need help, do you have trouble finding someone available to you? No   Have you been bothered in the last four weeks by sexual problems? No   Do you have difficulty concentrating, remembering or making decisions? Yes           Does the patient have evidence of cognitive impairment? No    Asiprin use counseling: Taking ASA appropriately as indicated      Recent Lab Results:    Visual Acuity:  No exam data present    Age-appropriate Screening Schedule:  Refer to the list below for future screening recommendations based on patient's age, sex and/or medical conditions. Orders for these recommended tests are listed in the plan section. The patient has been provided with a written plan.    Health Maintenance   Topic Date Due   • ZOSTER VACCINE (2 of 2) 07/12/2017   • LIPID PANEL  05/29/2019   • TDAP/TD VACCINES (2 - Td) 05/17/2027   • INFLUENZA VACCINE  Completed   • PNEUMOCOCCAL VACCINES (65+ LOW/MEDIUM RISK)  Addressed        Subjective   History of Present Illness    Monserrat Downs is a 88 y.o. female who presents for an Annual Wellness Visit.    The following portions of the patient's history were reviewed and updated as appropriate:   She  has a past medical history of Abscess of buttock, Anorectal abscess,  Benign essential hypertension, Benign hypertension, Carotid artery stenosis, Cellulitis, abdominal wall, Closed fracture of femur (CMS/HCC), COPD (chronic obstructive pulmonary disease) (CMS/HCC), Hip pain, Hyperglycemia, Hyperlipidemia, Influenza vaccine needed, Osteoarthritis, Primary generalized (osteo)arthritis, and S/P breast biopsy, right.  She does not have any pertinent problems on file.  She  has a past surgical history that includes Injection of Medication (07/12/2016); Injection of Medication (05/04/2016); Injection of Medication (10/27/2015); intramedullary nailing/rodding humerus, tibia, femur (03/14/2014); Injection of Medication (10/03/2012); and Injection of Medication (06/26/2015).  Her family history includes Lung cancer in her father; Ovarian cancer in her mother.  She  reports that  has never smoked. she has never used smokeless tobacco. She reports that she does not drink alcohol or use drugs.  Current Outpatient Medications   Medication Sig Dispense Refill   • albuterol sulfate  (90 Base) MCG/ACT inhaler Inhale 2 puffs Every 6 (Six) Hours As Needed for Wheezing. 3 inhaler 3   • aspirin 81 MG EC tablet 1 tablet, delayed release (DR/EC) at bedtime Oral     • atorvastatin (LIPITOR) 10 MG tablet TAKE 1 TABLET DAILY 90 tablet 1   • Biotin 99241 MCG tablet dispersible Take  by mouth.     • Calcium Citrate-Vitamin D (CITRACAL + D PO) 1 tablet, chewable every other day Oral     • celecoxib (CeleBREX) 200 MG capsule TAKE 1 CAPSULE DAILY 90 capsule 1   • Cyanocobalamin (B-12) 1000 MCG tablet Take  by mouth.     • Multiple Vitamins-Minerals (CENTRUM SILVER PO) 1 tablet every other day Oral     • omeprazole (priLOSEC) 40 MG capsule Take 1 capsule by mouth Daily. 90 capsule 2   • tiotropium (SPIRIVA HANDIHALER) 18 MCG per inhalation capsule Place 1 capsule into inhaler and inhale Daily. 90 capsule 3   • traMADol (ULTRAM) 50 MG tablet Take 1 tablet by mouth Every 6 (Six) Hours As Needed for Moderate  Pain . 360 tablet 0   • acetaminophen (TYLENOL) 325 MG tablet 650 mg tablet(s) every 4 hours Oral PRN     • polyethylene glycol (MIRALAX) packet Take 17 g by mouth Daily.     • senna (SENOKOT) 8.6 MG tablet tablet 2 tablet every day with supper Oral PRN     • Zoster Vac Recomb Adjuvanted (SHINGRIX) 50 MCG/0.5ML reconstituted suspension Inject 1 each into the appropriate muscle as directed by prescriber 1 (One) Time for 1 dose. 1 each 0     Current Facility-Administered Medications   Medication Dose Route Frequency Provider Last Rate Last Dose   • cyanocobalamin injection 1,000 mcg  1,000 mcg Intramuscular Q28 Days Mino Williamson MD   1,000 mcg at 02/26/18 1150     Current Outpatient Medications on File Prior to Visit   Medication Sig   • aspirin 81 MG EC tablet 1 tablet, delayed release (DR/EC) at bedtime Oral   • atorvastatin (LIPITOR) 10 MG tablet TAKE 1 TABLET DAILY   • Biotin 74339 MCG tablet dispersible Take  by mouth.   • Calcium Citrate-Vitamin D (CITRACAL + D PO) 1 tablet, chewable every other day Oral   • celecoxib (CeleBREX) 200 MG capsule TAKE 1 CAPSULE DAILY   • Cyanocobalamin (B-12) 1000 MCG tablet Take  by mouth.   • Multiple Vitamins-Minerals (CENTRUM SILVER PO) 1 tablet every other day Oral   • omeprazole (priLOSEC) 40 MG capsule Take 1 capsule by mouth Daily.   • traMADol (ULTRAM) 50 MG tablet Take 1 tablet by mouth Every 6 (Six) Hours As Needed for Moderate Pain .   • [DISCONTINUED] albuterol (PROVENTIL HFA;VENTOLIN HFA) 108 (90 Base) MCG/ACT inhaler Inhale 2 puffs Every 6 (Six) Hours As Needed for Wheezing.   • [DISCONTINUED] tiotropium (SPIRIVA HANDIHALER) 18 MCG per inhalation capsule Place 1 capsule into inhaler and inhale Daily.   • acetaminophen (TYLENOL) 325 MG tablet 650 mg tablet(s) every 4 hours Oral PRN   • polyethylene glycol (MIRALAX) packet Take 17 g by mouth Daily.   • senna (SENOKOT) 8.6 MG tablet tablet 2 tablet every day with supper Oral PRN   • [DISCONTINUED]  promethazine-dextromethorphan (PROMETHAZINE-DM) 6.25-15 MG/5ML syrup Take 5 mL by mouth 4 (Four) Times a Day As Needed for Cough.     Current Facility-Administered Medications on File Prior to Visit   Medication   • cyanocobalamin injection 1,000 mcg     She has No Known Allergies..    Outpatient Medications Prior to Visit   Medication Sig Dispense Refill   • aspirin 81 MG EC tablet 1 tablet, delayed release (DR/EC) at bedtime Oral     • atorvastatin (LIPITOR) 10 MG tablet TAKE 1 TABLET DAILY 90 tablet 1   • Biotin 12002 MCG tablet dispersible Take  by mouth.     • Calcium Citrate-Vitamin D (CITRACAL + D PO) 1 tablet, chewable every other day Oral     • celecoxib (CeleBREX) 200 MG capsule TAKE 1 CAPSULE DAILY 90 capsule 1   • Cyanocobalamin (B-12) 1000 MCG tablet Take  by mouth.     • Multiple Vitamins-Minerals (CENTRUM SILVER PO) 1 tablet every other day Oral     • omeprazole (priLOSEC) 40 MG capsule Take 1 capsule by mouth Daily. 90 capsule 2   • traMADol (ULTRAM) 50 MG tablet Take 1 tablet by mouth Every 6 (Six) Hours As Needed for Moderate Pain . 360 tablet 0   • albuterol (PROVENTIL HFA;VENTOLIN HFA) 108 (90 Base) MCG/ACT inhaler Inhale 2 puffs Every 6 (Six) Hours As Needed for Wheezing. 3 inhaler 3   • tiotropium (SPIRIVA HANDIHALER) 18 MCG per inhalation capsule Place 1 capsule into inhaler and inhale Daily. 90 capsule 3   • acetaminophen (TYLENOL) 325 MG tablet 650 mg tablet(s) every 4 hours Oral PRN     • polyethylene glycol (MIRALAX) packet Take 17 g by mouth Daily.     • senna (SENOKOT) 8.6 MG tablet tablet 2 tablet every day with supper Oral PRN     • promethazine-dextromethorphan (PROMETHAZINE-DM) 6.25-15 MG/5ML syrup Take 5 mL by mouth 4 (Four) Times a Day As Needed for Cough. 180 mL 0     Facility-Administered Medications Prior to Visit   Medication Dose Route Frequency Provider Last Rate Last Dose   • cyanocobalamin injection 1,000 mcg  1,000 mcg Intramuscular Q28 Days Mino Williamson MD   1,000  "[facility] at 02/26/18 1150       Patient Active Problem List   Diagnosis   • Fibrotic lung diseases (CMS/HCC)   • Cough   • Constipation   • Chronic pain of both knees   • Generalized OA   • Hyperglycemia   • Hyperlipidemia   • B12 deficiency   • Primary osteoarthritis of both knees   • Radiculopathy of leg   • Trochanteric bursitis of right hip   • Chronic low back pain   • Scoliosis (and kyphoscoliosis), idiopathic   • Abscess   • Perirectal abscess   • Carotid artery stenosis   • Benign essential hypertension       Advance Care Planning:  has an advance directive - a copy HAS NOT been provided. Have asked the patient to send this to us to add to record.    Identification of Risk Factors:  Risk factors include: weight , unhealthy diet, increased fall risk, chronic pain, vision limitations, hearing limitations and polypharmacy.    Review of Systems    Compared to one year ago, the patient feels her physical health is worse.  Compared to one year ago, the patient feels her mental health is worse.    Objective     Physical Exam    Vitals:    01/08/19 1011   BP: 114/60   Pulse: 78   Weight: 50.8 kg (112 lb)   Height: 162.6 cm (64\")   PainSc: 0-No pain       Patient's Body mass index is 19.22 kg/m². BMI is within normal parameters. No follow-up required.      Assessment/Plan   Patient Self-Management and Personalized Health Advice  The patient has been provided with information about: weight management, fall prevention, designing advance directives and mental health concerns and preventive services including:   · Advance directive, Fall Risk assessment done, Fall Risk plan of care done, Influenza vaccine, Nutrition counseling provided, Pneumococcal vaccine , Zostavax vaccine (Herpes Zoster).    Visit Diagnoses:    ICD-10-CM ICD-9-CM   1. Medicare annual wellness visit, initial Z00.00 V70.0   2. Fibrotic lung diseases (CMS/HCC) J84.10 515   3. Familial hypercholesterolemia E78.01 272.0   4. Stenosis of carotid artery, " unspecified laterality I65.29 433.10   5. Benign essential hypertension I10 401.1   6. B12 deficiency E53.8 266.2   7. Generalized OA M15.9 715.00   8. Hyperglycemia R73.9 790.29       Orders Placed This Encounter   Procedures   • CT Chest With Contrast     Standing Status:   Future     Standing Expiration Date:   4/6/2020   • Lipid panel     Standing Status:   Future     Standing Expiration Date:   1/8/2020   • Comprehensive metabolic panel     Standing Status:   Future     Standing Expiration Date:   1/8/2020   • CBC & Differential     Standing Status:   Future     Standing Expiration Date:   3/9/2019     Order Specific Question:   Manual Differential     Answer:   No       Outpatient Encounter Medications as of 1/8/2019   Medication Sig Dispense Refill   • albuterol sulfate  (90 Base) MCG/ACT inhaler Inhale 2 puffs Every 6 (Six) Hours As Needed for Wheezing. 3 inhaler 3   • aspirin 81 MG EC tablet 1 tablet, delayed release (DR/EC) at bedtime Oral     • atorvastatin (LIPITOR) 10 MG tablet TAKE 1 TABLET DAILY 90 tablet 1   • Biotin 41111 MCG tablet dispersible Take  by mouth.     • Calcium Citrate-Vitamin D (CITRACAL + D PO) 1 tablet, chewable every other day Oral     • celecoxib (CeleBREX) 200 MG capsule TAKE 1 CAPSULE DAILY 90 capsule 1   • Cyanocobalamin (B-12) 1000 MCG tablet Take  by mouth.     • Multiple Vitamins-Minerals (CENTRUM SILVER PO) 1 tablet every other day Oral     • omeprazole (priLOSEC) 40 MG capsule Take 1 capsule by mouth Daily. 90 capsule 2   • tiotropium (SPIRIVA HANDIHALER) 18 MCG per inhalation capsule Place 1 capsule into inhaler and inhale Daily. 90 capsule 3   • traMADol (ULTRAM) 50 MG tablet Take 1 tablet by mouth Every 6 (Six) Hours As Needed for Moderate Pain . 360 tablet 0   • [DISCONTINUED] albuterol (PROVENTIL HFA;VENTOLIN HFA) 108 (90 Base) MCG/ACT inhaler Inhale 2 puffs Every 6 (Six) Hours As Needed for Wheezing. 3 inhaler 3   • [DISCONTINUED] tiotropium (SPIRIVA  HANDIHALER) 18 MCG per inhalation capsule Place 1 capsule into inhaler and inhale Daily. 90 capsule 3   • acetaminophen (TYLENOL) 325 MG tablet 650 mg tablet(s) every 4 hours Oral PRN     • polyethylene glycol (MIRALAX) packet Take 17 g by mouth Daily.     • senna (SENOKOT) 8.6 MG tablet tablet 2 tablet every day with supper Oral PRN     • Zoster Vac Recomb Adjuvanted (SHINGRIX) 50 MCG/0.5ML reconstituted suspension Inject 1 each into the appropriate muscle as directed by prescriber 1 (One) Time for 1 dose. 1 each 0   • [DISCONTINUED] promethazine-dextromethorphan (PROMETHAZINE-DM) 6.25-15 MG/5ML syrup Take 5 mL by mouth 4 (Four) Times a Day As Needed for Cough. 180 mL 0     Facility-Administered Encounter Medications as of 1/8/2019   Medication Dose Route Frequency Provider Last Rate Last Dose   • cyanocobalamin injection 1,000 mcg  1,000 mcg Intramuscular Q28 Days Mino Williamson MD   1,000 mcg at 02/26/18 1150       Reviewed use of high risk medication in the elderly: yes  Reviewed for potential of harmful drug interactions in the elderly: yes    Follow Up:  Return in about 6 months (around 7/8/2019), or if symptoms worsen or fail to improve, for or sooner as needed, Next scheduled follow up.     An After Visit Summary and PPPS with all of these plans were given to the patient.

## 2019-01-08 NOTE — PROGRESS NOTES
"Subjective   Monserrat Downs is a 88 y.o. female. Patient here today with complaints of Establish Care  pt here today with daughter for recheck of copd, htn, hyperlipidemia, hyperglycemia. [facility] has seen dr tay and dr hernandez in the past due to hip pain, lower back pain, opted at that time to not have surgery however does take ultram prn pain, she reports still has \"plenty in my bottle\". Due for labs, needing refills on spiriva and albuterol inhalers.  was due to have repeat ct chest 4-4-19 , initially ordered by dr kelley. Has hx of varicella and would like shingles vaccine which she is due for today.     Vitals:    01/08/19 1011   BP: 114/60   Pulse: 78     Past Medical History:   Diagnosis Date   • Abscess of buttock    • Anorectal abscess    • Benign essential hypertension    • Benign hypertension    • Carotid artery stenosis    • Cellulitis, abdominal wall    • Closed fracture of femur (CMS/HCC)    • COPD (chronic obstructive pulmonary disease) (CMS/HCC)    • Hip pain    • Hyperglycemia    • Hyperlipidemia    • Influenza vaccine needed    • Osteoarthritis    • Primary generalized (osteo)arthritis    • S/P breast biopsy, right      Hyperlipidemia   This is a chronic problem. The current episode started more than 1 year ago. She has no history of chronic renal disease, diabetes, hypothyroidism, liver disease, obesity or nephrotic syndrome. There are no known factors aggravating her hyperlipidemia. Pertinent negatives include no chest pain, focal sensory loss, focal weakness, leg pain, myalgias or shortness of breath. Current antihyperlipidemic treatment includes statins, diet change and exercise. Compliance problems include adherence to exercise and adherence to diet.  Risk factors for coronary artery disease include hypertension, dyslipidemia and post-menopausal.   Hypertension   This is a chronic problem. The current episode started more than 1 year ago. The problem is unchanged. The problem is controlled. " Pertinent negatives include no anxiety, chest pain, headaches, malaise/fatigue, neck pain, orthopnea, palpitations, peripheral edema, PND, shortness of breath or sweats. Agents associated with hypertension include NSAIDs. Risk factors for coronary artery disease include post-menopausal state, dyslipidemia and sedentary lifestyle. Past treatments include lifestyle changes. Current antihypertension treatment includes lifestyle changes. Compliance problems include exercise and diet.  Identifiable causes of hypertension include a hypertension causing med. There is no history of chronic renal disease.   COPD   This is a chronic problem. The current episode started more than 1 month ago. The problem occurs constantly. The problem has been unchanged. Associated symptoms include coughing. Pertinent negatives include no chest pain, headaches, myalgias or neck pain. Nothing aggravates the symptoms. Treatments tried: inhalers , spiriva and albuterol  The treatment provided moderate relief.        The following portions of the patient's history were reviewed and updated as appropriate: allergies, current medications, past family history, past medical history, past social history, past surgical history and problem list.    Review of Systems   Constitutional: Negative.  Negative for malaise/fatigue.   HENT: Negative.    Eyes: Negative.    Respiratory: Positive for cough. Negative for shortness of breath.    Cardiovascular: Negative.  Negative for chest pain, palpitations, orthopnea and PND.   Gastrointestinal: Negative.    Endocrine: Negative.    Genitourinary: Negative.    Musculoskeletal: Negative.  Negative for myalgias and neck pain.   Skin: Negative.    Allergic/Immunologic: Negative.    Neurological: Negative.  Negative for focal weakness and headaches.   Hematological: Negative.    Psychiatric/Behavioral: Negative.        Objective   Physical Exam   Constitutional: She is oriented to person, place, and time. She appears  well-developed and well-nourished. No distress.   HENT:   Head: Normocephalic and atraumatic.   Neck: Normal carotid pulses present. Carotid bruit is not present.   Cardiovascular: Normal rate, regular rhythm, normal heart sounds and intact distal pulses. Exam reveals no gallop and no friction rub.   No murmur heard.  Pulmonary/Chest: Effort normal. No stridor. No respiratory distress. She has decreased breath sounds. She has no wheezes. She has rhonchi in the right lower field and the left lower field. She has no rales.   Hx of copd , fibrotic lung disease   Musculoskeletal: She exhibits no edema.   Neurological: She is alert and oriented to person, place, and time.   Skin: Skin is warm and dry. No rash noted. She is not diaphoretic. No erythema. No pallor.   Psychiatric: She has a normal mood and affect.   Nursing note and vitals reviewed.      Assessment/Plan   Monserrat was seen today for establish care.    Diagnoses and all orders for this visit:    Medicare annual wellness visit, initial    Fibrotic lung diseases (CMS/HCC)  -     CT Chest With Contrast; Future    Familial hypercholesterolemia  -     Lipid panel; Future  -     CBC & Differential; Future  -     Comprehensive metabolic panel; Future  -     CT Chest With Contrast; Future    Stenosis of carotid artery, unspecified laterality    Benign essential hypertension    B12 deficiency    Generalized OA    Hyperglycemia  -     Lipid panel; Future  -     CBC & Differential; Future  -     Comprehensive metabolic panel; Future  -     CT Chest With Contrast; Future    Other orders  -     albuterol sulfate  (90 Base) MCG/ACT inhaler; Inhale 2 puffs Every 6 (Six) Hours As Needed for Wheezing.  -     tiotropium (SPIRIVA HANDIHALER) 18 MCG per inhalation capsule; Place 1 capsule into inhaler and inhale Daily.  -     Zoster Vac Recomb Adjuvanted (SHINGRIX) 50 MCG/0.5ML reconstituted suspension; Inject 1 each into the appropriate muscle as directed by prescriber 1  (One) Time for 1 dose.    she is given refills on albuterol and spiriva inhalers  RX given for shingrix today, will receive at pharmacy  Labs ordered and will be obtained when fasting. Will inform via phone of results  CT chest with contrast ordered and to have repeated around 4-4-19 as scheduled  If nec , can consider referral on to pulmonology for further eval  They are aware and are in agreement to this plan  Reports is not due for pneumonia vaccine until 10-19  Health maintenance reviewed  Medicare wellness completed today  All questions and concerns are addressed with understanding noted.

## 2019-01-17 ENCOUNTER — LAB (OUTPATIENT)
Dept: LAB | Facility: OTHER | Age: 84
End: 2019-01-17

## 2019-01-17 DIAGNOSIS — E78.01 FAMILIAL HYPERCHOLESTEROLEMIA: ICD-10-CM

## 2019-01-17 DIAGNOSIS — R73.9 HYPERGLYCEMIA: ICD-10-CM

## 2019-01-17 LAB
ALBUMIN SERPL-MCNC: 4.6 G/DL (ref 3.5–5)
ALBUMIN/GLOB SERPL: 1.3 G/DL (ref 1.1–1.8)
ALP SERPL-CCNC: 70 U/L (ref 38–126)
ALT SERPL W P-5'-P-CCNC: 13 U/L
ANION GAP SERPL CALCULATED.3IONS-SCNC: 8 MMOL/L (ref 5–15)
AST SERPL-CCNC: 24 U/L (ref 14–36)
BASOPHILS # BLD AUTO: 0.04 10*3/MM3 (ref 0–0.2)
BASOPHILS NFR BLD AUTO: 0.4 % (ref 0–2)
BILIRUB SERPL-MCNC: 0.4 MG/DL (ref 0.2–1.3)
BUN BLD-MCNC: 18 MG/DL (ref 7–17)
BUN/CREAT SERPL: 22 (ref 7–25)
CALCIUM SPEC-SCNC: 9.6 MG/DL (ref 8.4–10.2)
CHLORIDE SERPL-SCNC: 105 MMOL/L (ref 98–107)
CHOLEST SERPL-MCNC: 161 MG/DL (ref 150–200)
CO2 SERPL-SCNC: 29 MMOL/L (ref 22–30)
CREAT BLD-MCNC: 0.82 MG/DL (ref 0.52–1.04)
DEPRECATED RDW RBC AUTO: 47.2 FL (ref 36.4–46.3)
EOSINOPHIL # BLD AUTO: 0.31 10*3/MM3 (ref 0–0.7)
EOSINOPHIL NFR BLD AUTO: 3.4 % (ref 0–7)
ERYTHROCYTE [DISTWIDTH] IN BLOOD BY AUTOMATED COUNT: 13.9 % (ref 11.5–14.5)
GFR SERPL CREATININE-BSD FRML MDRD: 66 ML/MIN/1.73 (ref 39–90)
GLOBULIN UR ELPH-MCNC: 3.6 GM/DL (ref 2.3–3.5)
GLUCOSE BLD-MCNC: 99 MG/DL (ref 74–99)
HCT VFR BLD AUTO: 37 % (ref 35–45)
HDLC SERPL-MCNC: 60 MG/DL (ref 40–59)
HGB BLD-MCNC: 11.6 G/DL (ref 12–15.5)
LDLC SERPL CALC-MCNC: 78 MG/DL
LDLC/HDLC SERPL: 1.3 {RATIO} (ref 0–3.22)
LYMPHOCYTES # BLD AUTO: 2.45 10*3/MM3 (ref 0.6–4.2)
LYMPHOCYTES NFR BLD AUTO: 26.9 % (ref 10–50)
MCH RBC QN AUTO: 30.4 PG (ref 26.5–34)
MCHC RBC AUTO-ENTMCNC: 31.4 G/DL (ref 31.4–36)
MCV RBC AUTO: 97.1 FL (ref 80–98)
MONOCYTES # BLD AUTO: 0.96 10*3/MM3 (ref 0–0.9)
MONOCYTES NFR BLD AUTO: 10.5 % (ref 0–12)
NEUTROPHILS # BLD AUTO: 5.35 10*3/MM3 (ref 2–8.6)
NEUTROPHILS NFR BLD AUTO: 58.8 % (ref 37–80)
PLATELET # BLD AUTO: 424 10*3/MM3 (ref 150–450)
PMV BLD AUTO: 8.8 FL (ref 8–12)
POTASSIUM BLD-SCNC: 4.8 MMOL/L (ref 3.4–5)
PROT SERPL-MCNC: 8.2 G/DL (ref 6.3–8.2)
RBC # BLD AUTO: 3.81 10*6/MM3 (ref 3.77–5.16)
SODIUM BLD-SCNC: 142 MMOL/L (ref 137–145)
TRIGL SERPL-MCNC: 114 MG/DL
VLDLC SERPL-MCNC: 22.8 MG/DL
WBC NRBC COR # BLD: 9.11 10*3/MM3 (ref 3.2–9.8)

## 2019-01-17 PROCEDURE — 85025 COMPLETE CBC W/AUTO DIFF WBC: CPT | Performed by: NURSE PRACTITIONER

## 2019-01-17 PROCEDURE — 80053 COMPREHEN METABOLIC PANEL: CPT | Performed by: NURSE PRACTITIONER

## 2019-01-17 PROCEDURE — 36415 COLL VENOUS BLD VENIPUNCTURE: CPT | Performed by: NURSE PRACTITIONER

## 2019-01-17 PROCEDURE — 80061 LIPID PANEL: CPT | Performed by: NURSE PRACTITIONER

## 2019-02-04 ENCOUNTER — OFFICE VISIT (OUTPATIENT)
Dept: FAMILY MEDICINE CLINIC | Facility: CLINIC | Age: 84
End: 2019-02-04

## 2019-02-04 VITALS
OXYGEN SATURATION: 98 % | TEMPERATURE: 98.3 F | WEIGHT: 114.2 LBS | BODY MASS INDEX: 19.5 KG/M2 | SYSTOLIC BLOOD PRESSURE: 108 MMHG | HEIGHT: 64 IN | DIASTOLIC BLOOD PRESSURE: 60 MMHG | HEART RATE: 88 BPM

## 2019-02-04 DIAGNOSIS — M15.9 GENERALIZED OA: ICD-10-CM

## 2019-02-04 DIAGNOSIS — M17.0 PRIMARY OSTEOARTHRITIS OF BOTH KNEES: ICD-10-CM

## 2019-02-04 DIAGNOSIS — I10 BENIGN ESSENTIAL HYPERTENSION: ICD-10-CM

## 2019-02-04 DIAGNOSIS — E78.01 FAMILIAL HYPERCHOLESTEROLEMIA: ICD-10-CM

## 2019-02-04 DIAGNOSIS — M70.61 TROCHANTERIC BURSITIS OF RIGHT HIP: ICD-10-CM

## 2019-02-04 DIAGNOSIS — H65.03 BILATERAL ACUTE SEROUS OTITIS MEDIA, RECURRENCE NOT SPECIFIED: Primary | ICD-10-CM

## 2019-02-04 DIAGNOSIS — J84.10 FIBROTIC LUNG DISEASES (HCC): Chronic | ICD-10-CM

## 2019-02-04 DIAGNOSIS — G89.29 CHRONIC LOW BACK PAIN, UNSPECIFIED BACK PAIN LATERALITY, WITH SCIATICA PRESENCE UNSPECIFIED: ICD-10-CM

## 2019-02-04 DIAGNOSIS — M54.5 CHRONIC LOW BACK PAIN, UNSPECIFIED BACK PAIN LATERALITY, WITH SCIATICA PRESENCE UNSPECIFIED: ICD-10-CM

## 2019-02-04 PROCEDURE — 96372 THER/PROPH/DIAG INJ SC/IM: CPT | Performed by: NURSE PRACTITIONER

## 2019-02-04 PROCEDURE — 99213 OFFICE O/P EST LOW 20 MIN: CPT | Performed by: NURSE PRACTITIONER

## 2019-02-04 RX ORDER — ATORVASTATIN CALCIUM 10 MG/1
10 TABLET, FILM COATED ORAL DAILY
Qty: 90 TABLET | Refills: 1 | Status: SHIPPED | OUTPATIENT
Start: 2019-02-04 | End: 2019-07-08 | Stop reason: SDUPTHER

## 2019-02-04 RX ORDER — TRAMADOL HYDROCHLORIDE 50 MG/1
50 TABLET ORAL EVERY 6 HOURS PRN
Qty: 90 TABLET | Refills: 0 | Status: SHIPPED | OUTPATIENT
Start: 2019-02-04 | End: 2019-09-05 | Stop reason: SDUPTHER

## 2019-02-04 RX ORDER — AZITHROMYCIN 250 MG/1
TABLET, FILM COATED ORAL
Qty: 6 TABLET | Refills: 0 | Status: SHIPPED | OUTPATIENT
Start: 2019-02-04 | End: 2019-05-02

## 2019-02-04 RX ADMIN — METHYLPREDNISOLONE ACETATE 80 MG: 80 INJECTION, SUSPENSION INTRA-ARTICULAR; INTRALESIONAL; INTRAMUSCULAR; SOFT TISSUE at 09:20

## 2019-02-05 RX ORDER — METHYLPREDNISOLONE ACETATE 80 MG/ML
80 INJECTION, SUSPENSION INTRA-ARTICULAR; INTRALESIONAL; INTRAMUSCULAR; SOFT TISSUE ONCE
Status: COMPLETED | OUTPATIENT
Start: 2019-02-05 | End: 2019-02-04

## 2019-02-05 NOTE — PROGRESS NOTES
Subjective   Monserrat Downs is a 88 y.o. female. Patient here today with complaints of pressure in ears (bilateral, medication refill)  pt here today with daughter for complaints of bilat earache, has had in the past when seeing dr kelley, was given steroid inj and zithromax which at that time resolved her complaints. Also has hx of OA, low back pain, hyperlipidemia, needing refills on lipitor and tramadol, yasmani both well . Labs UTD    Vitals:    02/04/19 1435   BP: 108/60   Pulse: 88   Temp: 98.3 °F (36.8 °C)   SpO2: 98%     Past Medical History:   Diagnosis Date   • Abscess of buttock    • Anorectal abscess    • Benign essential hypertension    • Benign hypertension    • Carotid artery stenosis    • Cellulitis, abdominal wall    • Closed fracture of femur (CMS/HCC)    • COPD (chronic obstructive pulmonary disease) (CMS/HCC)    • Hip pain    • Hyperglycemia    • Hyperlipidemia    • Influenza vaccine needed    • Osteoarthritis    • Primary generalized (osteo)arthritis    • S/P breast biopsy, right      Earache    There is pain in both ears. This is a new problem. The current episode started in the past 7 days. The problem occurs constantly. The problem has been unchanged. There has been no fever. The pain is mild. Associated symptoms include hearing loss (pt is 89 yo). Pertinent negatives include no ear discharge. She has tried nothing for the symptoms. The treatment provided no relief.        The following portions of the patient's history were reviewed and updated as appropriate: allergies, current medications, past family history, past medical history, past social history, past surgical history and problem list.    Review of Systems   Constitutional: Negative.    HENT: Positive for ear pain and hearing loss (pt is 89 yo). Negative for ear discharge.    Eyes: Negative.    Respiratory: Negative.    Cardiovascular: Negative.    Gastrointestinal: Negative.    Endocrine: Negative.    Genitourinary: Negative.     Musculoskeletal: Positive for arthralgias, back pain and gait problem.   Skin: Negative.    Allergic/Immunologic: Negative.    Hematological: Negative.    Psychiatric/Behavioral: Negative.        Objective   Physical Exam   Constitutional: She is oriented to person, place, and time. She appears well-developed and well-nourished. No distress.   HENT:   Head: Normocephalic and atraumatic.   Right Ear: Hearing, external ear and ear canal normal. There is tenderness. A middle ear effusion is present.   Left Ear: Hearing, external ear and ear canal normal. There is tenderness. A middle ear effusion is present.   Nose: Right sinus exhibits no maxillary sinus tenderness and no frontal sinus tenderness. Left sinus exhibits no maxillary sinus tenderness and no frontal sinus tenderness.   Mouth/Throat: Uvula is midline and mucous membranes are normal. Posterior oropharyngeal erythema present. No tonsillar exudate.   Cardiovascular: Normal rate, regular rhythm and normal heart sounds. Exam reveals no gallop and no friction rub.   No murmur heard.  Pulmonary/Chest: Effort normal and breath sounds normal. No stridor. No respiratory distress. She has no wheezes. She has no rales.   Musculoskeletal: She exhibits tenderness.   Neurological: She is alert and oriented to person, place, and time.   Skin: Skin is warm and dry. No rash noted. She is not diaphoretic. No erythema. No pallor.   Psychiatric: She has a normal mood and affect. Her behavior is normal.   Nursing note and vitals reviewed.      Assessment/Plan   Monserrat was seen today for pressure in ears.    Diagnoses and all orders for this visit:    Bilateral acute serous otitis media, recurrence not specified  -     methylPREDNISolone acetate (DEPO-medrol) injection 80 mg; Inject 1 mL into the appropriate muscle as directed by prescriber 1 (One) Time.    Familial hypercholesterolemia    Benign essential hypertension    Fibrotic lung diseases (CMS/HCC)    Trochanteric bursitis  of right hip    Chronic low back pain, unspecified back pain laterality, with sciatica presence unspecified    Primary osteoarthritis of both knees    Generalized OA    Other orders  -     atorvastatin (LIPITOR) 10 MG tablet; Take 1 tablet by mouth Daily.  -     azithromycin (ZITHROMAX Z-EMILE) 250 MG tablet; Take 2 tablets the first day, then 1 tablet daily for 4 days.  -     traMADol (ULTRAM) 50 MG tablet; Take 1 tablet by mouth Every 6 (Six) Hours As Needed for Moderate Pain .    kaden is obtained and reviewed  Ultram and lipitor refilled for her today, see above  She is given depo medrol 80mg  inj as above and zithromax pk . States these helped similar symptoms in the past  If symptoms should persist or worsen, she is asked to return for follow up  She is aware and is in agreement to this plan  All questions and concerns are addressed with understanding noted.   KADEN query complete. Treatment plan to include limited course of prescribed controlled substance. Risks including addiction, benefits, and alternatives presented to patient.

## 2019-04-01 DIAGNOSIS — Z29.8 ENCOUNTER FOR HYDRATION PRIOR TO CT SCAN: Primary | ICD-10-CM

## 2019-04-02 ENCOUNTER — LAB (OUTPATIENT)
Dept: LAB | Facility: OTHER | Age: 84
End: 2019-04-02

## 2019-04-02 DIAGNOSIS — Z29.8 ENCOUNTER FOR HYDRATION PRIOR TO CT SCAN: ICD-10-CM

## 2019-04-02 LAB — BUN BLD-MCNC: 18 MG/DL (ref 7–23)

## 2019-04-02 PROCEDURE — 84520 ASSAY OF UREA NITROGEN: CPT | Performed by: NURSE PRACTITIONER

## 2019-04-02 PROCEDURE — 36415 COLL VENOUS BLD VENIPUNCTURE: CPT | Performed by: NURSE PRACTITIONER

## 2019-04-09 ENCOUNTER — TELEPHONE (OUTPATIENT)
Dept: FAMILY MEDICINE CLINIC | Facility: CLINIC | Age: 84
End: 2019-04-09

## 2019-04-09 NOTE — TELEPHONE ENCOUNTER
I advised the patient of her lab results.       ----- Message from PIETER Fam sent at 4/4/2019  8:55 PM CDT -----  Pls inform lab is nml.

## 2019-04-19 DIAGNOSIS — R93.89 ABNORMAL CT OF THE CHEST: Primary | ICD-10-CM

## 2019-05-01 NOTE — PROGRESS NOTES
Pulmonary Consultation    Ivon Evans, PIETER,    Thank you for asking me to see Monserrat Downs for   Chief Complaint   Patient presents with   • abnormal lung CT   • Cough   .    Subjective     History of Present Illness  Monserrat Downs is a 88 y.o. female with a PMH significant for PAD, HTN, HLD, and osteoarthritis who presents for evaluation of an abnormal CT. Pt states she had her initial CT in September for Dr. Carpenter who told her she had COPD. She reports she was having cough and chest soreness when she took a deep breath. Pt state she is fine if she breathes normally. Her cough started last Fall and she denies sputum. Pt was started on Spiriva in October which she takes daily. She is using albuterol ~2x/d when she gets chest discomfort with her cough. Pt feels like the albuterol does more for her than Spiriva. She changed PCPs and her NP recommended another CT which had been recommended another CT in 6 months from Dr. Carpenter. Ivon told the pt that her she had fibrosis. Her daughter reports the pt had XRT for breast cancer in 1998, and they were told the radiation may effect the right lung. She denies prior lung disease or abnormal CXRs. Pt denies asthma, frequent bronchitis, and allergies. She does get some nasal congestion which is relieved with nasal saline.  She has never been treated for allergies.  She denies a history of rheumatologic or connective tissue diseases. She is a never smoker. 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 There is no lung disease in the family.     Review of Systems: History obtained from chart review and the patient.  Review of Systems   Constitutional: Positive for appetite change, fatigue and unexpected weight change. Negative for fever.   HENT: Positive for hearing loss. Negative for congestion and postnasal drip.    Respiratory: Positive for cough, chest tightness and shortness of breath. Negative for wheezing.    Cardiovascular: Negative for  chest pain and leg swelling.   Musculoskeletal: Positive for arthralgias and back pain.     As described in the HPI. Otherwise, remainder of ROS (14 systems) were negative.    Patient Active Problem List   Diagnosis   • Fibrotic lung diseases (CMS/HCC)   • Cough   • Constipation   • Chronic pain of both knees   • Generalized OA   • Hyperglycemia   • Hyperlipidemia   • B12 deficiency   • Primary osteoarthritis of both knees   • Radiculopathy of leg   • Trochanteric bursitis of right hip   • Chronic low back pain   • Scoliosis (and kyphoscoliosis), idiopathic   • Abscess   • Perirectal abscess   • Carotid artery stenosis   • Benign essential hypertension         Current Outpatient Medications:   •  albuterol sulfate  (90 Base) MCG/ACT inhaler, Inhale 2 puffs Every 6 (Six) Hours As Needed for Wheezing., Disp: 3 inhaler, Rfl: 3  •  aspirin 81 MG EC tablet, 1 tablet, delayed release (DR/EC) at bedtime Oral, Disp: , Rfl:   •  atorvastatin (LIPITOR) 10 MG tablet, Take 1 tablet by mouth Daily., Disp: 90 tablet, Rfl: 1  •  Biotin 86616 MCG tablet dispersible, Take  by mouth., Disp: , Rfl:   •  Calcium Citrate-Vitamin D (CITRACAL + D PO), 1 tablet, chewable every other day Oral, Disp: , Rfl:   •  celecoxib (CeleBREX) 200 MG capsule, TAKE 1 CAPSULE DAILY, Disp: 90 capsule, Rfl: 1  •  Cyanocobalamin (B-12) 1000 MCG tablet, Take  by mouth., Disp: , Rfl:   •  Fluticasone Furoate-Vilanterol (BREO ELLIPTA) 100-25 MCG/INH inhaler, Inhale 1 puff Daily., Disp: 3 each, Rfl: 4  •  Multiple Vitamins-Minerals (CENTRUM SILVER PO), 1 tablet every other day Oral, Disp: , Rfl:   •  omeprazole (priLOSEC) 40 MG capsule, Take 1 capsule by mouth Daily., Disp: 90 capsule, Rfl: 2  •  polyethylene glycol (MIRALAX) packet, Take 17 g by mouth Daily., Disp: , Rfl:   •  senna (SENOKOT) 8.6 MG tablet tablet, 2 tablet every day with supper Oral PRN, Disp: , Rfl:   •  traMADol (ULTRAM) 50 MG tablet, Take 1 tablet by mouth Every 6 (Six) Hours As  "Needed for Moderate Pain ., Disp: 90 tablet, Rfl: 0    Current Facility-Administered Medications:   •  cyanocobalamin injection 1,000 mcg, 1,000 mcg, Intramuscular, Q28 Days, Mino Williamson MD, 1,000 mcg at 02/26/18 1150    No Known Allergies    Past Medical History:   Diagnosis Date   • Abscess of buttock    • Anorectal abscess    • Benign essential hypertension    • Benign hypertension    • Carotid artery stenosis    • Cellulitis, abdominal wall    • Closed fracture of femur (CMS/[facility])    • COPD (chronic obstructive pulmonary disease) (CMS/[facility])    • Hip pain    • Hyperglycemia    • Hyperlipidemia    • Influenza vaccine needed    • Osteoarthritis    • Primary generalized (osteo)arthritis    • S/P breast biopsy, right      Past Surgical History:   Procedure Laterality Date   • INJECTION OF MEDICATION  07/12/2016    B12 (3)   • INJECTION OF MEDICATION  05/04/2016    Depo Medrol (Methylprednisone) (1)   • INJECTION OF MEDICATION  10/27/2015    Depo Medrol (Methylprednisone) 80mg (8)   • INJECTION OF MEDICATION  10/03/2012    Rocephin (1)   • INJECTION OF MEDICATION  06/26/2015    Toradol (6)   • INTRAMEDULLARY NAILING/RODDING HUMERUS, TIBIA, FEMUR  03/14/2014    Orthotic procedure     Social History     Socioeconomic History   • Marital status:      Spouse name: Neha   • Number of children: 3   • Years of education: Not on file   • Highest education level: Not on file   Tobacco Use   • Smoking status: Never Smoker   • Smokeless tobacco: Never Used   Substance and Sexual Activity   • Alcohol use: No   • Drug use: No   • Sexual activity: Not Currently     Partners: Male     Family History   Problem Relation Age of Onset   • Ovarian cancer Mother         went to colon   • Lung cancer Father           Objective     Blood pressure 126/66, pulse 78, height 162.6 cm (64\"), weight 50.3 kg (111 lb), SpO2 92 %, not currently breastfeeding.  Physical Exam   Constitutional: She is oriented to person, place, and time. " Vital signs are normal. She appears well-developed and well-nourished.   HENT:   Head: Normocephalic and atraumatic.   Nose: Nose normal. No mucosal edema.   Mouth/Throat: Uvula is midline, oropharynx is clear and moist and mucous membranes are normal.   Mallampati   Eyes: Conjunctivae, EOM and lids are normal. Pupils are equal, round, and reactive to light.   Eyeglasses   Neck: Trachea normal and normal range of motion. No tracheal tenderness present. No thyroid mass present.   Cardiovascular: Normal rate, regular rhythm and normal heart sounds. PMI is not displaced. Exam reveals no gallop.   No murmur heard.  Pulmonary/Chest: Effort normal and breath sounds normal. No respiratory distress. She has no decreased breath sounds. She has no wheezes. She has no rhonchi. Chest wall is not dull to percussion. She exhibits no tenderness.   Abdominal: Soft. Normal appearance and bowel sounds are normal. There is no hepatomegaly. There is no tenderness.   Musculoskeletal:   Normal gait, no extremity edema     Vascular Status -  Her right foot exhibits no edema. Her left foot exhibits no edema.  Lymphadenopathy:        Head (right side): No submandibular adenopathy present.        Head (left side): No submandibular adenopathy present.     She has no cervical adenopathy.        Right: No supraclavicular adenopathy present.        Left: No supraclavicular adenopathy present.   Neurological: She is alert and oriented to person, place, and time.   Skin: Skin is warm and dry. No rash noted. No cyanosis. Nails show no clubbing.   Psychiatric: She has a normal mood and affect. Her speech is normal and behavior is normal. Judgment normal.   Nursing note and vitals reviewed.      PFTs: 5/2/19 (independently reviewed and interpreted by me)  Ratio 82  FVC 2.28/ 101%  FEV1 1.86/ 113%  TLC 3.82/ 75%  DLCO 45.41/ 187%  Normal spirometry.  Mildly reduced TLC suggesting early restriction.  Supranormal diffusing capacity which may be due to  leak during testing.  No comparative data available.    Radiology (independently reviewed and interpreted by me): CT chest 4/8/19 showed peripheral and basilar predominant fibrotic changes with some areas of honeycombing at the bases consistent with UIP       Assessment/Plan     Monserrat was seen today for abnormal lung ct and cough.    Diagnoses and all orders for this visit:    Abnormal CT of the chest  -     Full Pulmonary Function Test Without Bronchodilator    UIP (usual interstitial pneumonitis) (CMS/HCC)    Chronic cough  -     Fluticasone Furoate-Vilanterol (BREO ELLIPTA) 100-25 MCG/INH inhaler; Inhale 1 puff Daily.         Discussion/ Recommendations:   PFTs only suggested mild restriction.  I did personally review the patient's recent CT chest which showed a basilar and peripheral predominant fibrosis with some honeycombing.  According to reports from previous chest x-rays dating back to 2014, they did note chronic interstitial changes.  I counseled the patient and her daughter regarding the findings as well as possible etiologies.  I do not think the patient would benefit from any directed therapy at point fibrosis given her advanced age as well as normal pulmonary function.  While it is possible that the patient's cough is related to the underlying fibrotic changes, I think is more likely related to an upper airway source versus possible asthma.  I do not think the patient has underlying COPD.  Since she has perceived benefit from albuterol, I would recommend changing her Spiriva to an ICS/LABA.  I did review possible side effects and risks of the ICS/LABA.    -Stop Spiriva  -Start Breo 100 daily.  Sample provided and instructed on use.  Rinse mouth well after use.  -Continue albuterol as needed for dyspnea, wheeze, chest tightness, or cough.  -No direct therapy at pulmonary fibrosis.  -If cough persists and she is having some symptoms of allergies, would recommend starting a nasal steroid.      Patient's  Body mass index is 19.05 kg/m². BMI is below normal parameters. Recommendations include: treating the underlying disease process.           Return in about 4 weeks (around 5/30/2019) for Recheck cough.      Thank you for allowing me to participate in the care of Monserrat Downs. Please do not hesitate to contact me with any questions.         This document has been electronically signed by Casi Sparks MD on May 2, 2019 2:30 PM      Dictated using Dragon

## 2019-05-02 ENCOUNTER — PROCEDURE VISIT (OUTPATIENT)
Dept: PULMONOLOGY | Facility: CLINIC | Age: 84
End: 2019-05-02

## 2019-05-02 ENCOUNTER — OFFICE VISIT (OUTPATIENT)
Dept: PULMONOLOGY | Facility: CLINIC | Age: 84
End: 2019-05-02

## 2019-05-02 VITALS
OXYGEN SATURATION: 92 % | HEIGHT: 64 IN | BODY MASS INDEX: 18.95 KG/M2 | HEART RATE: 78 BPM | SYSTOLIC BLOOD PRESSURE: 126 MMHG | DIASTOLIC BLOOD PRESSURE: 66 MMHG | WEIGHT: 111 LBS

## 2019-05-02 DIAGNOSIS — J84.112 UIP (USUAL INTERSTITIAL PNEUMONITIS) (HCC): ICD-10-CM

## 2019-05-02 DIAGNOSIS — R93.89 ABNORMAL CHEST CT: Primary | ICD-10-CM

## 2019-05-02 DIAGNOSIS — R93.89 ABNORMAL CT OF THE CHEST: Primary | ICD-10-CM

## 2019-05-02 DIAGNOSIS — R05.3 CHRONIC COUGH: ICD-10-CM

## 2019-05-02 PROCEDURE — 94729 DIFFUSING CAPACITY: CPT | Performed by: INTERNAL MEDICINE

## 2019-05-02 PROCEDURE — 94727 GAS DIL/WSHOT DETER LNG VOL: CPT | Performed by: INTERNAL MEDICINE

## 2019-05-02 PROCEDURE — 99204 OFFICE O/P NEW MOD 45 MIN: CPT | Performed by: INTERNAL MEDICINE

## 2019-05-29 NOTE — PROGRESS NOTES
Pulmonary Office Follow-up     Monserrat Downs is seen in the office today for   Chief Complaint   Patient presents with   • Cough     Follow-up   .    Subjective     History of Present Illness  Monserrat Downs is a 88 y.o. female with a PMH significant for UIP, PAD, HTN, HLD, and osteoarthritis who presents for follow-up of UIP and chronic cough. she was last seen on 5/2/2019, at which time I recommended stopping Spiriva and starting Breo daily.  She states that she thinks her chest is feeling better since starting on Breo as she does not feel a sore she did previously.  Patient still has an intermittent nonproductive cough but it is not bothersome and does not awaken her at night.  She denies any sputum, chest pain, or fevers.  She has not felt the need to use the albuterol.  Patient wonders if she could be having some allergies to her cat but she denies any significant changes in her symptoms when she is exposed to her [employer] when she is not at home.  Patient has not had any new medications or received any recent steroids.      Review of Systems: History obtained from chart review and the patient.  Review of Systems   Constitutional: Positive for fatigue. Negative for appetite change, fever and unexpected weight change.   HENT: Positive for hearing loss and postnasal drip. Negative for congestion.    Respiratory: Positive for cough and shortness of breath. Negative for chest tightness and wheezing.    Cardiovascular: Negative for chest pain and leg swelling.   Musculoskeletal: Positive for arthralgias and back pain.     As described in the HPI. Otherwise, remainder of ROS (14 systems) were negative.    Patient Active Problem List   Diagnosis   • UIP (usual interstitial pneumonitis) (CMS/HCC)   • Cough   • Constipation   • Chronic pain of both knees   • Generalized OA   • Hyperglycemia   • Hyperlipidemia   • B12 deficiency   • Primary osteoarthritis of both knees   • Radiculopathy of leg   • Trochanteric bursitis of  right hip   • Chronic low back pain   • Scoliosis (and kyphoscoliosis), idiopathic   • Abscess   • Perirectal abscess   • Carotid artery stenosis   • Benign essential hypertension         Current Outpatient Medications:   •  albuterol sulfate  (90 Base) MCG/ACT inhaler, Inhale 2 puffs Every 6 (Six) Hours As Needed for Wheezing., Disp: 3 inhaler, Rfl: 3  •  aspirin 81 MG EC tablet, 1 tablet, delayed release (DR/EC) at bedtime Oral, Disp: , Rfl:   •  atorvastatin (LIPITOR) 10 MG tablet, Take 1 tablet by mouth Daily., Disp: 90 tablet, Rfl: 1  •  Biotin 04389 MCG tablet dispersible, Take  by mouth., Disp: , Rfl:   •  Calcium Citrate-Vitamin D (CITRACAL + D PO), 1 tablet, chewable every other day Oral, Disp: , Rfl:   •  celecoxib (CeleBREX) 200 MG capsule, TAKE 1 CAPSULE DAILY, Disp: 90 capsule, Rfl: 1  •  Fluticasone Furoate-Vilanterol (BREO ELLIPTA) 100-25 MCG/INH inhaler, Inhale 1 puff Daily., Disp: 3 each, Rfl: 4  •  Multiple Vitamins-Minerals (CENTRUM SILVER PO), 1 tablet every other day Oral, Disp: , Rfl:   •  omeprazole (priLOSEC) 40 MG capsule, Take 1 capsule by mouth Daily., Disp: 90 capsule, Rfl: 2  •  polyethylene glycol (MIRALAX) packet, Take 17 g by mouth Daily., Disp: , Rfl:   •  senna (SENOKOT) 8.6 MG tablet tablet, 2 tablet every day with supper Oral PRN, Disp: , Rfl:   •  traMADol (ULTRAM) 50 MG tablet, Take 1 tablet by mouth Every 6 (Six) Hours As Needed for Moderate Pain ., Disp: 90 tablet, Rfl: 0  •  Cyanocobalamin (B-12) 1000 MCG tablet, Take  by mouth., Disp: , Rfl:     Current Facility-Administered Medications:   •  cyanocobalamin injection 1,000 mcg, 1,000 mcg, Intramuscular, Q28 Days, Mino Williamson MD, 1,000 mcg at 02/26/18 1150    No Known Allergies    Past Medical History:   Diagnosis Date   • Abscess of buttock    • Anorectal abscess    • Benign essential hypertension    • Benign hypertension    • Carotid artery stenosis    • Cellulitis, abdominal wall    • Closed fracture of  "femur (CMS/[facility])    • COPD (chronic obstructive pulmonary disease) (CMS/[facility])    • Hip pain    • Hyperglycemia    • Hyperlipidemia    • Influenza vaccine needed    • Osteoarthritis    • Primary generalized (osteo)arthritis    • S/P breast biopsy, right      Past Surgical History:   Procedure Laterality Date   • INJECTION OF MEDICATION  07/12/2016    B12 (3)   • INJECTION OF MEDICATION  05/04/2016    Depo Medrol (Methylprednisone) (1)   • INJECTION OF MEDICATION  10/27/2015    Depo Medrol (Methylprednisone) 80mg (8)   • INJECTION OF MEDICATION  10/03/2012    Rocephin (1)   • INJECTION OF MEDICATION  06/26/2015    Toradol (6)   • INTRAMEDULLARY NAILING/RODDING HUMERUS, TIBIA, FEMUR  03/14/2014    Orthotic procedure     Social History     Socioeconomic History   • Marital status:      Spouse name: Nhea   • Number of children: 3   • Years of education: Not on file   • Highest education level: Not on file   Tobacco Use   • Smoking status: Never Smoker   • Smokeless tobacco: Never Used   Substance and Sexual Activity   • Alcohol use: No   • Drug use: No   • Sexual activity: Not Currently     Partners: Male     Family History   Problem Relation Age of Onset   • Ovarian cancer Mother         went to colon   • Lung cancer Father           Objective     Blood pressure 152/87, pulse 75, height 162.6 cm (64\"), weight 49.9 kg (110 lb), SpO2 96 %, not currently breastfeeding.  Physical Exam   Constitutional: She is oriented to person, place, and time. Vital signs are normal. She appears well-developed and well-nourished.   HENT:   Head: Normocephalic and atraumatic.   Nose: Nose normal. No mucosal edema or rhinorrhea.   Mouth/Throat: Uvula is midline, oropharynx is clear and moist and mucous membranes are normal.   Mallampati   Eyes: Conjunctivae, EOM and lids are normal. Pupils are equal, round, and reactive to light.   Eyeglasses   Neck: Trachea normal and normal range of motion. No tracheal tenderness present. No " thyroid mass present.   Cardiovascular: Normal rate, regular rhythm and normal heart sounds. PMI is not displaced. Exam reveals no gallop.   No murmur heard.  Pulmonary/Chest: Effort normal and breath sounds normal. No respiratory distress. She has no decreased breath sounds. She has no wheezes. She has no rhonchi. She exhibits no tenderness.   Abdominal: Soft. Normal appearance and bowel sounds are normal. There is no hepatomegaly. There is no tenderness.   Musculoskeletal:   Normal gait, no extremity edema     Vascular Status -  Her right foot exhibits no edema. Her left foot exhibits no edema.  Lymphadenopathy:        Head (right side): No submandibular adenopathy present.        Head (left side): No submandibular adenopathy present.     She has no cervical adenopathy.        Right: No supraclavicular adenopathy present.        Left: No supraclavicular adenopathy present.   Neurological: She is alert and oriented to person, place, and time.   Skin: Skin is warm and dry. No rash noted. No cyanosis. Nails show no clubbing.   Psychiatric: She has a normal mood and affect. Her speech is normal and behavior is normal. Judgment normal.   Nursing note and vitals reviewed.      PFTs: 5/2/19 (independently reviewed and interpreted by me)  Ratio 82  FVC 2.28/ 101%  FEV1 1.86/ 113%  TLC 3.82/ 75%  DLCO 45.41/ 187%  Normal spirometry.  Mildly reduced TLC suggesting early restriction.  Supranormal diffusing capacity which may be due to leak during testing.  No comparative data available.    Radiology (independently reviewed and interpreted by me): CT chest 4/8/19 showed peripheral and basilar predominant fibrotic changes with some areas of honeycombing at the bases consistent with UIP       Assessment/Plan     Monserrat was seen today for cough.    Diagnoses and all orders for this visit:    UIP (usual interstitial pneumonitis) (CMS/HCC)    Chronic cough    Environmental allergies         Discussion/ Recommendations:   She has had  symptomatic improvement since changing to Breo so I do not feel that further changes are necessary at this time.  She does continue to have cough which could be related to some allergies but she is not having any significant allergy symptoms at this time.  I recommend that she continue on Breo daily and to use her albuterol as needed.  I have also made suggestions if she does feel like allergies are becoming an issue.    -Continue Breo daily.  -Use albuterol as needed for dyspnea, wheeze, chest tightness, or cough.  -No direct therapy at pulmonary fibrosis.  -If she does develop allergic rhinitis, I would recommend starting over-the-counter generic Flonase daily.  -If she develops other allergy symptoms such as sneeze, watery eyes, or itchy skin, she should try generic Zyrtec daily.  -Encouraged regular, progressive exercise as tolerated.      Patient's Body mass index is 18.88 kg/m². BMI is below normal parameters. Recommendations include: treating the underlying disease process.           Return in about 3 months (around 8/30/2019) for Recheck asthma.      Thank you for allowing me to participate in the care of Monserrat Downs. Please do not hesitate to contact me with any questions.         This document has been electronically signed by Casi Sparks MD on May 30, 2019 1:56 PM      Dictated using Dragon

## 2019-05-30 ENCOUNTER — OFFICE VISIT (OUTPATIENT)
Dept: PULMONOLOGY | Facility: CLINIC | Age: 84
End: 2019-05-30

## 2019-05-30 VITALS
SYSTOLIC BLOOD PRESSURE: 152 MMHG | HEART RATE: 75 BPM | DIASTOLIC BLOOD PRESSURE: 87 MMHG | WEIGHT: 110 LBS | OXYGEN SATURATION: 96 % | BODY MASS INDEX: 18.78 KG/M2 | HEIGHT: 64 IN

## 2019-05-30 DIAGNOSIS — J84.112 UIP (USUAL INTERSTITIAL PNEUMONITIS) (HCC): Primary | ICD-10-CM

## 2019-05-30 DIAGNOSIS — R05.3 CHRONIC COUGH: ICD-10-CM

## 2019-05-30 DIAGNOSIS — Z91.09 ENVIRONMENTAL ALLERGIES: ICD-10-CM

## 2019-05-30 PROCEDURE — 99214 OFFICE O/P EST MOD 30 MIN: CPT | Performed by: INTERNAL MEDICINE

## 2019-05-30 NOTE — PATIENT INSTRUCTIONS
If you start having nasal allergy symptoms, you can use store brand Flonase daily  If you get other allergy symptoms, try store brand zyrtec daily

## 2019-07-01 DIAGNOSIS — Z00.00 WELLNESS EXAMINATION: Primary | ICD-10-CM

## 2019-07-05 ENCOUNTER — LAB (OUTPATIENT)
Dept: LAB | Facility: OTHER | Age: 84
End: 2019-07-05

## 2019-07-05 DIAGNOSIS — Z00.00 WELLNESS EXAMINATION: ICD-10-CM

## 2019-07-05 LAB
ALBUMIN SERPL-MCNC: 4.3 G/DL (ref 3.5–5)
ALBUMIN/GLOB SERPL: 1.2 G/DL (ref 1.1–1.8)
ALP SERPL-CCNC: 50 U/L (ref 38–126)
ALT SERPL W P-5'-P-CCNC: 15 U/L
ANION GAP SERPL CALCULATED.3IONS-SCNC: 10 MMOL/L (ref 5–15)
AST SERPL-CCNC: 24 U/L (ref 14–36)
BASOPHILS # BLD AUTO: 0.02 10*3/MM3 (ref 0–0.2)
BASOPHILS NFR BLD AUTO: 0.2 % (ref 0–1.5)
BILIRUB SERPL-MCNC: 0.3 MG/DL (ref 0.2–1.3)
BUN BLD-MCNC: 20 MG/DL (ref 7–23)
BUN/CREAT SERPL: 23.5 (ref 7–25)
CALCIUM SPEC-SCNC: 9.7 MG/DL (ref 8.4–10.2)
CHLORIDE SERPL-SCNC: 105 MMOL/L (ref 101–112)
CHOLEST SERPL-MCNC: 166 MG/DL (ref 150–200)
CO2 SERPL-SCNC: 28 MMOL/L (ref 22–30)
CREAT BLD-MCNC: 0.85 MG/DL (ref 0.52–1.04)
DEPRECATED RDW RBC AUTO: 49.4 FL (ref 37–54)
EOSINOPHIL # BLD AUTO: 0.3 10*3/MM3 (ref 0–0.4)
EOSINOPHIL NFR BLD AUTO: 3.2 % (ref 0.3–6.2)
ERYTHROCYTE [DISTWIDTH] IN BLOOD BY AUTOMATED COUNT: 14.4 % (ref 12.3–15.4)
GFR SERPL CREATININE-BSD FRML MDRD: 63 ML/MIN/1.73 (ref 39–90)
GLOBULIN UR ELPH-MCNC: 3.5 GM/DL (ref 2.3–3.5)
GLUCOSE BLD-MCNC: 97 MG/DL (ref 70–99)
HCT VFR BLD AUTO: 35.8 % (ref 34–46.6)
HDLC SERPL-MCNC: 68 MG/DL (ref 40–59)
HGB BLD-MCNC: 11.8 G/DL (ref 12–15.9)
LDLC SERPL CALC-MCNC: 87 MG/DL
LDLC/HDLC SERPL: 1.27 {RATIO} (ref 0–3.22)
LYMPHOCYTES # BLD AUTO: 2.79 10*3/MM3 (ref 0.7–3.1)
LYMPHOCYTES NFR BLD AUTO: 30.2 % (ref 19.6–45.3)
MCH RBC QN AUTO: 32.4 PG (ref 26.6–33)
MCHC RBC AUTO-ENTMCNC: 33 G/DL (ref 31.5–35.7)
MCV RBC AUTO: 98.4 FL (ref 79–97)
MONOCYTES # BLD AUTO: 0.97 10*3/MM3 (ref 0.1–0.9)
MONOCYTES NFR BLD AUTO: 10.5 % (ref 5–12)
NEUTROPHILS # BLD AUTO: 5.16 10*3/MM3 (ref 1.7–7)
NEUTROPHILS NFR BLD AUTO: 55.9 % (ref 42.7–76)
PLATELET # BLD AUTO: 367 10*3/MM3 (ref 140–450)
PMV BLD AUTO: 8.6 FL (ref 6–12)
POTASSIUM BLD-SCNC: 5.1 MMOL/L (ref 3.4–5)
PROT SERPL-MCNC: 7.8 G/DL (ref 6.3–8.6)
RBC # BLD AUTO: 3.64 10*6/MM3 (ref 3.77–5.28)
SODIUM BLD-SCNC: 143 MMOL/L (ref 137–145)
TRIGL SERPL-MCNC: 57 MG/DL
VLDLC SERPL-MCNC: 11.4 MG/DL
WBC NRBC COR # BLD: 9.24 10*3/MM3 (ref 3.4–10.8)

## 2019-07-05 PROCEDURE — 83036 HEMOGLOBIN GLYCOSYLATED A1C: CPT | Performed by: NURSE PRACTITIONER

## 2019-07-05 PROCEDURE — 80061 LIPID PANEL: CPT | Performed by: NURSE PRACTITIONER

## 2019-07-05 PROCEDURE — 80053 COMPREHEN METABOLIC PANEL: CPT | Performed by: NURSE PRACTITIONER

## 2019-07-05 PROCEDURE — 84481 FREE ASSAY (FT-3): CPT | Performed by: NURSE PRACTITIONER

## 2019-07-05 PROCEDURE — 36415 COLL VENOUS BLD VENIPUNCTURE: CPT | Performed by: NURSE PRACTITIONER

## 2019-07-05 PROCEDURE — 84436 ASSAY OF TOTAL THYROXINE: CPT | Performed by: NURSE PRACTITIONER

## 2019-07-05 PROCEDURE — 85025 COMPLETE CBC W/AUTO DIFF WBC: CPT | Performed by: NURSE PRACTITIONER

## 2019-07-05 PROCEDURE — 84443 ASSAY THYROID STIM HORMONE: CPT | Performed by: NURSE PRACTITIONER

## 2019-07-06 LAB
HBA1C MFR BLD: 5.3 % (ref 4.8–5.6)
T3FREE SERPL-MCNC: 2.82 PG/ML (ref 2–4.4)
T4 SERPL-MCNC: 7.28 MCG/DL (ref 4.5–11.7)
TSH SERPL DL<=0.05 MIU/L-ACNC: 2.79 MIU/ML (ref 0.27–4.2)

## 2019-07-08 ENCOUNTER — OFFICE VISIT (OUTPATIENT)
Dept: FAMILY MEDICINE CLINIC | Facility: CLINIC | Age: 84
End: 2019-07-08

## 2019-07-08 VITALS
DIASTOLIC BLOOD PRESSURE: 52 MMHG | WEIGHT: 112.8 LBS | HEART RATE: 62 BPM | TEMPERATURE: 98.5 F | SYSTOLIC BLOOD PRESSURE: 122 MMHG | BODY MASS INDEX: 19.26 KG/M2 | HEIGHT: 64 IN

## 2019-07-08 DIAGNOSIS — I10 BENIGN ESSENTIAL HYPERTENSION: Chronic | ICD-10-CM

## 2019-07-08 DIAGNOSIS — E78.01 FAMILIAL HYPERCHOLESTEROLEMIA: Chronic | ICD-10-CM

## 2019-07-08 DIAGNOSIS — I65.29 STENOSIS OF CAROTID ARTERY, UNSPECIFIED LATERALITY: ICD-10-CM

## 2019-07-08 DIAGNOSIS — M79.604 RIGHT LEG PAIN: Primary | ICD-10-CM

## 2019-07-08 DIAGNOSIS — M15.9 GENERALIZED OA: Chronic | ICD-10-CM

## 2019-07-08 DIAGNOSIS — J84.89 INTERSTITIAL PNEUMONITIS (HCC): ICD-10-CM

## 2019-07-08 PROCEDURE — 99214 OFFICE O/P EST MOD 30 MIN: CPT | Performed by: NURSE PRACTITIONER

## 2019-07-16 PROBLEM — J84.89 INTERSTITIAL PNEUMONITIS (HCC): Status: ACTIVE | Noted: 2019-07-16

## 2019-07-16 RX ORDER — ATORVASTATIN CALCIUM 10 MG/1
10 TABLET, FILM COATED ORAL DAILY
Qty: 90 TABLET | Refills: 1 | Status: SHIPPED | OUTPATIENT
Start: 2019-07-16 | End: 2019-09-05 | Stop reason: SDUPTHER

## 2019-07-16 RX ORDER — CELECOXIB 200 MG/1
200 CAPSULE ORAL DAILY
Qty: 90 CAPSULE | Refills: 1 | Status: SHIPPED | OUTPATIENT
Start: 2019-07-16 | End: 2019-09-05 | Stop reason: SDUPTHER

## 2019-07-16 NOTE — PROGRESS NOTES
Subjective   Monserrat Downs is a 89 y.o. female. Patient here today with complaints of Follow-up and Hyperlipidemia  pt here today with daughter for recheck . She has hx of htn, hyperlipidemia, OA, interstitial pneumonitis and complaining of RLE pain, has seen dr ruffin, pulmonology and is on breo and ventolin, zyrtec, reports symptoms improved. Also complaining of thick, yellowed toenail on R great toenail x months, worsening.     Vitals:    07/08/19 1100   BP: 122/52   Pulse: 62   Temp: 98.5 °F (36.9 °C)     Past Medical History:   Diagnosis Date   • Abscess of buttock    • Anorectal abscess    • Benign essential hypertension    • Benign hypertension    • Carotid artery stenosis    • Cellulitis, abdominal wall    • Closed fracture of femur (CMS/HCC)    • COPD (chronic obstructive pulmonary disease) (CMS/HCC)    • Hip pain    • Hyperglycemia    • Hyperlipidemia    • Influenza vaccine needed    • Osteoarthritis    • Primary generalized (osteo)arthritis    • S/P breast biopsy, right      Hypertension   This is a chronic problem. The current episode started more than 1 year ago. The problem is unchanged. The problem is controlled. Pertinent negatives include no chest pain or shortness of breath. Agents associated with hypertension include NSAIDs. Risk factors for coronary artery disease include post-menopausal state, dyslipidemia and sedentary lifestyle. Past treatments include lifestyle changes. Current antihypertension treatment includes lifestyle changes. Compliance problems include exercise and diet.  Identifiable causes of hypertension include a hypertension causing med. There is no history of chronic renal disease.   Hyperlipidemia   This is a chronic problem. The current episode started more than 1 year ago. She has no history of chronic renal disease, diabetes, hypothyroidism, liver disease, obesity or nephrotic syndrome. Pertinent negatives include no chest pain, focal sensory loss, focal weakness, leg pain,  myalgias or shortness of breath. Current antihyperlipidemic treatment includes diet change, exercise and statins. Compliance problems include adherence to exercise and adherence to diet.  Risk factors for coronary artery disease include post-menopausal, a sedentary lifestyle, hypertension and dyslipidemia.        The following portions of the patient's history were reviewed and updated as appropriate: allergies, current medications, past family history, past medical history, past social history, past surgical history and problem list.    Review of Systems   Constitutional: Negative.    HENT: Negative.    Eyes: Negative.    Respiratory: Negative.  Negative for shortness of breath.    Cardiovascular: Negative.  Negative for chest pain.   Gastrointestinal: Negative.    Endocrine: Negative.    Genitourinary: Negative.    Musculoskeletal: Negative.  Negative for myalgias.   Skin: Negative.    Allergic/Immunologic: Negative.    Neurological: Negative.  Negative for focal weakness.   Hematological: Negative.    Psychiatric/Behavioral: Negative.        Objective   Physical Exam   Constitutional: She is oriented to person, place, and time. She appears well-developed and well-nourished. No distress.   HENT:   Head: Normocephalic and atraumatic.   Neck: Normal carotid pulses present. Carotid bruit is not present.   Cardiovascular: Normal rate, regular rhythm, normal heart sounds and intact distal pulses. Exam reveals no gallop and no friction rub.   No murmur heard.  Pulmonary/Chest: Effort normal and breath sounds normal. No stridor. No respiratory distress. She has no wheezes. She has no rales.   Neurological: She is alert and oriented to person, place, and time.   Skin: Skin is warm and dry. She is not diaphoretic.   Great toenail is yellowed, thick, nontender   Nursing note and vitals reviewed.      Assessment/Plan   Monserrat was seen today for follow-up and hyperlipidemia.    Diagnoses and all orders for this visit:    Right  leg pain  -     Duplex Venous Lower Extremity - Right CAR; Future    Familial hypercholesterolemia    Stenosis of carotid artery, unspecified laterality    Benign essential hypertension    Generalized OA    Interstitial pneumonitis (CMS/HCC)  Comments:  continue follow up with dr ruffin, pulmonology     Other orders  -     atorvastatin (LIPITOR) 10 MG tablet; Take 1 tablet by mouth Daily.  -     celecoxib (CeleBREX) 200 MG capsule; Take 1 capsule by mouth Daily.  -     ciclopirox (PENLAC) 8 % solution; Apply  topically to the appropriate area as directed Every Night.    she is treated with penlac as above x 3 months, if no improvement can consider lamisil   celebrex refilled today, lipitor refilled today as above  Follow up with dr ruffin as scheduled  US RLE ordered and she will be informed of results via phone, can consider referral if indicated  They are aware and are in agreement to this plan  All questions and concerns are addressed with understanding noted.   Follow up here in 6 months for recheck or sooner if needed

## 2019-08-29 PROBLEM — R54 ADVANCED AGE: Status: ACTIVE | Noted: 2019-08-29

## 2019-08-29 PROBLEM — Z91.09 ENVIRONMENTAL ALLERGIES: Status: ACTIVE | Noted: 2019-08-29

## 2019-08-29 NOTE — PROGRESS NOTES
Pulmonary Office Follow-up     Monserrat Downs is seen in the office today for   Chief Complaint   Patient presents with   • UIP   .    Subjective     History of Present Illness  Monserrat Downs is a 89 y.o. female with a PMH significant for UIP, PAD, HTN, HLD, and osteoarthritis who presents for follow-up of UIP and chronic cough.     She was last seen on 5/30/2019, at which time she had improvement with changing to Breo but still had some cough.  He states that she has been doing fairly well but does continue to have a nonproductive cough when she takes deep breaths.  Patient states there was one day when she missed her Breo and the next day she did feel like it was hard to take a deep breath so she went back on it.  She is using her albuterol every few days for her cough and reports relief.  She denies any sputum, fevers, chest pain, weight changes, or leg swelling.  Patient denies any nasal congestion or postnasal drip.  She does complain of decreased energy but thinks it could simply be related to the aging process.  Patient reports that she does have several awakenings during the night to use the bathroom and when she gets out of bed in the morning she has a lot of low back pain which makes her fatigue.  She states that she was having some pain on the lateral aspect of her right leg and noticed discoloration of her right great toe so she was seen by her PCP.  Patient had an ultrasound of her leg which was negative for DVT.  She was given a medication for probable fungal nail infection.    Review of Systems: History obtained from chart review and the patient.  Review of Systems   Constitutional: Positive for fatigue. Negative for appetite change, fever and unexpected weight change.   HENT: Positive for hearing loss. Negative for congestion and postnasal drip.    Respiratory: Positive for cough and shortness of breath. Negative for chest tightness and wheezing.    Cardiovascular: Negative for chest pain and leg  swelling.   Musculoskeletal: Positive for arthralgias and back pain.     As described in the HPI. Otherwise, remainder of ROS (14 systems) were negative.    Patient Active Problem List   Diagnosis   • UIP (usual interstitial pneumonitis) (CMS/HCC)   • Cough   • Constipation   • Chronic pain of both knees   • Generalized OA   • Hyperglycemia   • Hyperlipidemia   • B12 deficiency   • Primary osteoarthritis of both knees   • Radiculopathy of leg   • Trochanteric bursitis of right hip   • Chronic low back pain   • Scoliosis (and kyphoscoliosis), idiopathic   • Abscess   • Perirectal abscess   • Carotid artery stenosis   • Benign essential hypertension   • Interstitial pneumonitis (CMS/HCC)   • Environmental allergies   • Advanced age         Current Outpatient Medications:   •  albuterol sulfate  (90 Base) MCG/ACT inhaler, Inhale 2 puffs Every 6 (Six) Hours As Needed for Wheezing., Disp: 3 inhaler, Rfl: 3  •  aspirin 81 MG EC tablet, 1 tablet, delayed release (DR/EC) at bedtime Oral, Disp: , Rfl:   •  atorvastatin (LIPITOR) 10 MG tablet, Take 1 tablet by mouth Daily., Disp: 90 tablet, Rfl: 1  •  Biotin 91098 MCG tablet dispersible, Take  by mouth., Disp: , Rfl:   •  Calcium Citrate-Vitamin D (CITRACAL + D PO), 1 tablet, chewable every other day Oral, Disp: , Rfl:   •  celecoxib (CeleBREX) 200 MG capsule, Take 1 capsule by mouth Daily., Disp: 90 capsule, Rfl: 1  •  Cyanocobalamin (B-12) 1000 MCG tablet, Take  by mouth., Disp: , Rfl:   •  Fluticasone Furoate-Vilanterol (BREO ELLIPTA) 100-25 MCG/INH inhaler, Inhale 1 puff Daily., Disp: 3 each, Rfl: 4  •  Multiple Vitamins-Minerals (CENTRUM SILVER PO), 1 tablet every other day Oral, Disp: , Rfl:   •  omeprazole (priLOSEC) 40 MG capsule, Take 1 capsule by mouth Daily., Disp: 90 capsule, Rfl: 2  •  polyethylene glycol (MIRALAX) packet, Take 17 g by mouth Daily., Disp: , Rfl:   •  senna (SENOKOT) 8.6 MG tablet tablet, 2 tablet every day with supper Oral PRN, Disp: ,  "Rfl:   •  traMADol (ULTRAM) 50 MG tablet, Take 1 tablet by mouth Every 6 (Six) Hours As Needed for Moderate Pain ., Disp: 90 tablet, Rfl: 0    Current Facility-Administered Medications:   •  cyanocobalamin injection 1,000 mcg, 1,000 mcg, Intramuscular, Q28 Days, Mino Williamson MD, 1,000 mcg at 02/26/18 1150    No Known Allergies    Past Medical History:   Diagnosis Date   • Abscess of buttock    • Anorectal abscess    • Benign essential hypertension    • Benign hypertension    • Carotid artery stenosis    • Cellulitis, abdominal wall    • Closed fracture of femur (CMS/HCC)    • COPD (chronic obstructive pulmonary disease) (CMS/[facility])    • Hip pain    • Hyperglycemia    • Hyperlipidemia    • Influenza vaccine needed    • Osteoarthritis    • Primary generalized (osteo)arthritis    • S/P breast biopsy, right      Past Surgical History:   Procedure Laterality Date   • INJECTION OF MEDICATION  07/12/2016    B12 (3)   • INJECTION OF MEDICATION  05/04/2016    Depo Medrol (Methylprednisone) (1)   • INJECTION OF MEDICATION  10/27/2015    Depo Medrol (Methylprednisone) 80mg (8)   • INJECTION OF MEDICATION  10/03/2012    Rocephin (1)   • INJECTION OF MEDICATION  06/26/2015    Toradol (6)   • INTRAMEDULLARY NAILING/RODDING HUMERUS, TIBIA, FEMUR  03/14/2014    Orthotic procedure     Social History     Socioeconomic History   • Marital status:      Spouse name: Neha   • Number of children: 3   • Years of education: Not on file   • Highest education level: Not on file   Tobacco Use   • Smoking status: Never Smoker   • Smokeless tobacco: Never Used   Substance and Sexual Activity   • Alcohol use: No   • Drug use: No   • Sexual activity: Not Currently     Partners: Male     Family History   Problem Relation Age of Onset   • Ovarian cancer Mother         went to colon   • Lung cancer Father           Objective     Blood pressure 121/55, pulse 90, height 162.6 cm (64\"), weight 50.8 kg (112 lb), SpO2 98 %, not currently " breastfeeding.  Physical Exam   Constitutional: She is oriented to person, place, and time. Vital signs are normal. She appears well-developed and well-nourished.   HENT:   Head: Normocephalic and atraumatic.   Nose: Nose normal. No mucosal edema or rhinorrhea.   Mouth/Throat: Uvula is midline, oropharynx is clear and moist and mucous membranes are normal.   Mallampati 1   Eyes: Conjunctivae, EOM and lids are normal. Pupils are equal, round, and reactive to light.   Eyeglasses   Neck: Trachea normal and normal range of motion. No tracheal tenderness present. No thyroid mass present.   Cardiovascular: Normal rate, regular rhythm and normal heart sounds. PMI is not displaced. Exam reveals no gallop.   No murmur heard.  Pulmonary/Chest: Effort normal and breath sounds normal. No respiratory distress. She has no decreased breath sounds. She has no wheezes. She has no rhonchi. She exhibits no tenderness.   Abdominal: Soft. Normal appearance and bowel sounds are normal. There is no hepatomegaly. There is no tenderness.   Musculoskeletal:   In wheelchair, no extremity edema     Vascular Status -  Her right foot exhibits no edema. Her left foot exhibits no edema.  Lymphadenopathy:        Head (right side): No submandibular adenopathy present.        Head (left side): No submandibular adenopathy present.     She has no cervical adenopathy.        Right: No supraclavicular adenopathy present.        Left: No supraclavicular adenopathy present.   Neurological: She is alert and oriented to person, place, and time.   Skin: Skin is warm and dry. No rash noted. No cyanosis. Nails show no clubbing.   Discoloration of right great toe   Psychiatric: She has a normal mood and affect. Her speech is normal and behavior is normal. Judgment normal.   Nursing note and vitals reviewed.      PFTs: 5/2/19 (independently reviewed and interpreted by me)  Ratio 82  FVC 2.28/ 101%  FEV1 1.86/ 113%  TLC 3.82/ 75%  DLCO 45.41/ 187%  Normal  spirometry.  Mildly reduced TLC suggesting early restriction.  Supranormal diffusing capacity which may be due to leak during testing.  No comparative data available.    Radiology (independently reviewed and interpreted by me): CT chest 4/8/19 showed peripheral and basilar predominant fibrotic changes with some areas of honeycombing at the bases consistent with UIP       Assessment/Plan     Monserrat was seen today for uip.    Diagnoses and all orders for this visit:    UIP (usual interstitial pneumonitis) (CMS/HCC)    Cough    Environmental allergies    Advanced age    Onychomycosis         Discussion/ Recommendations:   She has been doing fairly well, but given her continued issues with cough, I think it would be worth a trial of escalating her ICS dose.  I have given her samples of Breo 200 to try and if she does feel less cough with its use we will send a new prescription.  Otherwise, she is not having any significant allergy symptoms.  I did examine her leg and did not see any concerning findings, but she likely does have a fungal nail infection of her right great toe.    -Trial of Breo 200 daily.  Samples provided and instructed on use.  If she does feel like it works better than Breo 100, she is to contact the clinic and we will send a new prescription.  -Use albuterol as needed for dyspnea, wheeze, chest tightness, or cough.  -No direct therapy at pulmonary fibrosis.  -If she does develop allergic rhinitis, I would recommend starting over-the-counter generic Flonase daily.  -Encouraged regular, progressive exercise as tolerated.  -Continue using topical medication for onychomycosis  -Up-to-date with flu and pneumococcal vaccines.  Encouraged patient to get annual flu vaccine when available.      Patient's Body mass index is 19.22 kg/m². BMI is below normal parameters. Recommendations include: treating the underlying disease process.           Return in about 3 months (around 11/30/2019) for Recheck UIP,  cough.      Thank you for allowing me to participate in the care of Monserrat Downs. Please do not hesitate to contact me with any questions.         This document has been electronically signed by Casi Sparks MD on August 30, 2019 2:48 PM      Dictated using Dragon

## 2019-08-30 ENCOUNTER — OFFICE VISIT (OUTPATIENT)
Dept: PULMONOLOGY | Facility: CLINIC | Age: 84
End: 2019-08-30

## 2019-08-30 VITALS
HEIGHT: 64 IN | WEIGHT: 112 LBS | HEART RATE: 90 BPM | OXYGEN SATURATION: 98 % | BODY MASS INDEX: 19.12 KG/M2 | DIASTOLIC BLOOD PRESSURE: 55 MMHG | SYSTOLIC BLOOD PRESSURE: 121 MMHG

## 2019-08-30 DIAGNOSIS — J84.112 UIP (USUAL INTERSTITIAL PNEUMONITIS) (HCC): Primary | ICD-10-CM

## 2019-08-30 DIAGNOSIS — B35.1 ONYCHOMYCOSIS: ICD-10-CM

## 2019-08-30 DIAGNOSIS — R54 ADVANCED AGE: ICD-10-CM

## 2019-08-30 DIAGNOSIS — Z91.09 ENVIRONMENTAL ALLERGIES: ICD-10-CM

## 2019-08-30 DIAGNOSIS — R05.9 COUGH: ICD-10-CM

## 2019-08-30 PROCEDURE — 99214 OFFICE O/P EST MOD 30 MIN: CPT | Performed by: INTERNAL MEDICINE

## 2019-09-05 ENCOUNTER — OFFICE VISIT (OUTPATIENT)
Dept: FAMILY MEDICINE CLINIC | Facility: CLINIC | Age: 84
End: 2019-09-05

## 2019-09-05 VITALS
OXYGEN SATURATION: 97 % | BODY MASS INDEX: 19.12 KG/M2 | HEART RATE: 39 BPM | SYSTOLIC BLOOD PRESSURE: 118 MMHG | HEIGHT: 64 IN | DIASTOLIC BLOOD PRESSURE: 46 MMHG | WEIGHT: 112 LBS

## 2019-09-05 DIAGNOSIS — M48.061 NEUROFORAMINAL STENOSIS OF LUMBAR SPINE: ICD-10-CM

## 2019-09-05 DIAGNOSIS — G89.29 CHRONIC LEFT-SIDED LOW BACK PAIN WITH LEFT-SIDED SCIATICA: Primary | ICD-10-CM

## 2019-09-05 DIAGNOSIS — M51.36 DDD (DEGENERATIVE DISC DISEASE), LUMBAR: ICD-10-CM

## 2019-09-05 DIAGNOSIS — M54.42 CHRONIC LEFT-SIDED LOW BACK PAIN WITH LEFT-SIDED SCIATICA: Primary | ICD-10-CM

## 2019-09-05 PROCEDURE — 96372 THER/PROPH/DIAG INJ SC/IM: CPT | Performed by: NURSE PRACTITIONER

## 2019-09-05 PROCEDURE — 99214 OFFICE O/P EST MOD 30 MIN: CPT | Performed by: NURSE PRACTITIONER

## 2019-09-05 RX ORDER — METHYLPREDNISOLONE ACETATE 80 MG/ML
80 INJECTION, SUSPENSION INTRA-ARTICULAR; INTRALESIONAL; INTRAMUSCULAR; SOFT TISSUE ONCE
Status: COMPLETED | OUTPATIENT
Start: 2019-09-05 | End: 2019-09-05

## 2019-09-05 RX ORDER — TRAMADOL HYDROCHLORIDE 50 MG/1
50 TABLET ORAL EVERY 6 HOURS PRN
Qty: 60 TABLET | Refills: 0 | Status: SHIPPED | OUTPATIENT
Start: 2019-09-05 | End: 2019-11-13 | Stop reason: SDUPTHER

## 2019-09-05 RX ORDER — CELECOXIB 200 MG/1
200 CAPSULE ORAL DAILY
Qty: 90 CAPSULE | Refills: 1 | Status: SHIPPED | OUTPATIENT
Start: 2019-09-05 | End: 2020-03-09

## 2019-09-05 RX ORDER — ATORVASTATIN CALCIUM 10 MG/1
10 TABLET, FILM COATED ORAL DAILY
Qty: 90 TABLET | Refills: 1 | Status: SHIPPED | OUTPATIENT
Start: 2019-09-05 | End: 2020-03-09

## 2019-09-05 RX ADMIN — METHYLPREDNISOLONE ACETATE 80 MG: 80 INJECTION, SUSPENSION INTRA-ARTICULAR; INTRALESIONAL; INTRAMUSCULAR; SOFT TISSUE at 14:45

## 2019-09-05 NOTE — PROGRESS NOTES
"Subjective   Monserrat Downs is a 89 y.o. female. Patient here today with complaints of Back Pain and Hip Pain  Patient here today with daughter complaining of pain in lower back radiating to left hip, history of left hip fracture repaired by Dr. Sandoval.  She had this performed she reports approximately 2 to 3 years ago, after healing from hip surgery she continued to have low back pain, was referred from Dr. Sandoval to Dr. Howell, had MRI of L-spine, this is reviewed from 2017.  It does reveal degenerative disc disease with neuroforaminal stenosis, she has been given tramadol previously which helped the pain but does cause constipation and \"grogginess \".  Rates pain as 6/10 on pain scale.  Denies fever or bowel or bladder changes.    Vitals:    09/05/19 1411   BP: 118/46   Pulse: (!) 39   SpO2: 97%     Past Medical History:   Diagnosis Date   • Abscess of buttock    • Anorectal abscess    • Benign essential hypertension    • Benign hypertension    • Carotid artery stenosis    • Cellulitis, abdominal wall    • Closed fracture of femur (CMS/HCC)    • COPD (chronic obstructive pulmonary disease) (CMS/HCC)    • Hip pain    • Hyperglycemia    • Hyperlipidemia    • Influenza vaccine needed    • Osteoarthritis    • Primary generalized (osteo)arthritis    • S/P breast biopsy, right      Back Pain   This is a recurrent problem. The current episode started more than 1 year ago. The problem occurs constantly. The problem has been waxing and waning since onset. The pain is present in the lumbar spine. The quality of the pain is described as aching. The pain radiates to the left thigh. The pain is at a severity of 6/10. The pain is moderate. The pain is the same all the time. The symptoms are aggravated by bending, standing, twisting and position. Associated symptoms include leg pain. Pertinent negatives include no bladder incontinence, bowel incontinence, chest pain, dysuria, fever, numbness, paresis, paresthesias or tingling. Risk " factors include menopause, lack of exercise, poor posture and sedentary lifestyle. She has tried analgesics and bed rest for the symptoms. The treatment provided mild relief.        The following portions of the patient's history were reviewed and updated as appropriate: allergies, current medications, past family history, past medical history, past social history, past surgical history and problem list.    Review of Systems   Constitutional: Negative.  Negative for fever.   HENT: Negative.    Eyes: Negative.    Respiratory: Negative.    Cardiovascular: Negative.  Negative for chest pain.   Gastrointestinal: Negative.  Negative for bowel incontinence.   Endocrine: Negative.    Genitourinary: Negative.  Negative for bladder incontinence and dysuria.   Musculoskeletal: Positive for arthralgias, back pain and gait problem.   Skin: Negative.    Allergic/Immunologic: Negative.    Neurological: Negative for tingling, numbness and paresthesias.   Hematological: Negative.    Psychiatric/Behavioral: Negative.        Objective   Physical Exam   Constitutional: She is oriented to person, place, and time. She appears well-developed and well-nourished. No distress.   HENT:   Head: Normocephalic and atraumatic.   Cardiovascular: Normal rate, regular rhythm and normal heart sounds. Exam reveals no gallop and no friction rub.   No murmur heard.  HR rechecked by myself at 78   Pulmonary/Chest: Effort normal. No respiratory distress.   Lung fields are more clear than her normal, continues on breo at increased dose and continues to see pulmdr ruffin, pulse ox on RA 97% today   Musculoskeletal: She exhibits tenderness.        Lumbar back: She exhibits decreased range of motion, tenderness and bony tenderness.   She is not ambulatory in office today, in wheelchair and assisted by her daughter    Neurological: She is alert and oriented to person, place, and time.   Skin: Skin is warm and dry. No rash noted. She is not diaphoretic. No  erythema. No pallor.   Psychiatric: She has a normal mood and affect. Her behavior is normal.   Nursing note and vitals reviewed.      Assessment/Plan   Monserrat was seen today for back pain and hip pain.    Diagnoses and all orders for this visit:    Chronic left-sided low back pain with left-sided sciatica  -     methylPREDNISolone acetate (DEPO-medrol) injection 80 mg    DDD (degenerative disc disease), lumbar    Neuroforaminal stenosis of lumbar spine    Other orders  -     celecoxib (CeleBREX) 200 MG capsule; Take 1 capsule by mouth Daily.  -     atorvastatin (LIPITOR) 10 MG tablet; Take 1 tablet by mouth Daily.  -     traMADol (ULTRAM) 50 MG tablet; Take 1 tablet by mouth Every 6 (Six) Hours As Needed for Moderate Pain .    Previous MRI from 2017 reviewed, Kaden obtained and reviewed, she is given refills on Celebrex and will be prescribed Ultram as needed pain as above, advised to continue with stool softeners daily to help with constipation from medication.  She is given Depo-Medrol 80 mg injection IM in office today, tolerated well.  Should symptoms persist or worsen she will return to clinic for follow-up.  We did discuss potentially referring her elsewhere and repeating MRI if symptoms do persist.  She will let me know and follow-up should she have any emergent signs or symptoms which are discussed today or should pain worsen or persist.  They are aware and in agreement to this plan.  All questions and concerns are addressed with understanding noted. KADEN query complete. Treatment plan to include limited course of prescribed controlled substance. Risks including addiction, benefits, and alternatives presented to patient.

## 2019-09-10 DIAGNOSIS — M25.552 LEFT HIP PAIN: Primary | ICD-10-CM

## 2019-09-10 DIAGNOSIS — M54.42 CHRONIC LEFT-SIDED LOW BACK PAIN WITH LEFT-SIDED SCIATICA: ICD-10-CM

## 2019-09-10 DIAGNOSIS — G89.29 CHRONIC LEFT-SIDED LOW BACK PAIN WITH LEFT-SIDED SCIATICA: ICD-10-CM

## 2019-09-10 DIAGNOSIS — M25.559 ARTHRALGIA OF HIP, UNSPECIFIED LATERALITY: Primary | ICD-10-CM

## 2019-09-16 ENCOUNTER — TELEPHONE (OUTPATIENT)
Dept: PULMONOLOGY | Facility: CLINIC | Age: 84
End: 2019-09-16

## 2019-09-16 DIAGNOSIS — R93.89 ABNORMAL X-RAY: ICD-10-CM

## 2019-09-16 DIAGNOSIS — M54.5 LOW BACK PAIN, UNSPECIFIED BACK PAIN LATERALITY, UNSPECIFIED CHRONICITY, WITH SCIATICA PRESENCE UNSPECIFIED: Primary | ICD-10-CM

## 2019-09-16 NOTE — TELEPHONE ENCOUNTER
Notified patient that a rx for her Breo has been sent to [facility].          ----- Message from Britney Miller sent at 9/16/2019  2:49 PM CDT -----  Pt is needing a refill of Breo sent to [facility]

## 2019-09-20 ENCOUNTER — HOSPITAL ENCOUNTER (OUTPATIENT)
Dept: MRI IMAGING | Facility: HOSPITAL | Age: 84
Discharge: HOME | End: 2019-09-20
Admitting: NURSE PRACTITIONER

## 2019-09-20 ENCOUNTER — APPOINTMENT (OUTPATIENT)
Dept: MRI IMAGING | Facility: HOSPITAL | Age: 84
End: 2019-09-20

## 2019-09-20 DIAGNOSIS — M54.5 LOW BACK PAIN, UNSPECIFIED BACK PAIN LATERALITY, UNSPECIFIED CHRONICITY, WITH SCIATICA PRESENCE UNSPECIFIED: ICD-10-CM

## 2019-09-20 DIAGNOSIS — R93.89 ABNORMAL X-RAY: ICD-10-CM

## 2019-09-20 PROCEDURE — 72148 MRI LUMBAR SPINE W/O DYE: CPT

## 2019-09-23 DIAGNOSIS — R93.89 ABNORMAL MRI: Primary | ICD-10-CM

## 2019-11-13 RX ORDER — TRAMADOL HYDROCHLORIDE 50 MG/1
50 TABLET ORAL EVERY 6 HOURS PRN
Qty: 60 TABLET | Refills: 0 | Status: SHIPPED | OUTPATIENT
Start: 2019-11-13 | End: 2020-02-10 | Stop reason: SDUPTHER

## 2019-11-14 ENCOUNTER — OFFICE VISIT (OUTPATIENT)
Dept: FAMILY MEDICINE CLINIC | Facility: CLINIC | Age: 84
End: 2019-11-14

## 2019-11-14 ENCOUNTER — LAB (OUTPATIENT)
Dept: LAB | Facility: OTHER | Age: 84
End: 2019-11-14

## 2019-11-14 VITALS
OXYGEN SATURATION: 94 % | WEIGHT: 112 LBS | TEMPERATURE: 100.3 F | HEART RATE: 87 BPM | SYSTOLIC BLOOD PRESSURE: 112 MMHG | BODY MASS INDEX: 19.12 KG/M2 | HEIGHT: 64 IN | DIASTOLIC BLOOD PRESSURE: 72 MMHG

## 2019-11-14 DIAGNOSIS — R50.9 FEVER, UNSPECIFIED FEVER CAUSE: ICD-10-CM

## 2019-11-14 DIAGNOSIS — J06.9 ACUTE URI: Primary | ICD-10-CM

## 2019-11-14 DIAGNOSIS — R05.9 COUGH: ICD-10-CM

## 2019-11-14 DIAGNOSIS — D72.829 LEUKOCYTOSIS, UNSPECIFIED TYPE: ICD-10-CM

## 2019-11-14 LAB
ALBUMIN SERPL-MCNC: 4 G/DL (ref 3.5–5)
ALBUMIN/GLOB SERPL: 1.1 G/DL (ref 1.1–1.8)
ALP SERPL-CCNC: 79 U/L (ref 38–126)
ALT SERPL W P-5'-P-CCNC: 10 U/L
ANION GAP SERPL CALCULATED.3IONS-SCNC: 9 MMOL/L (ref 5–15)
AST SERPL-CCNC: 19 U/L (ref 14–36)
BASOPHILS # BLD AUTO: 0.03 10*3/MM3 (ref 0–0.2)
BASOPHILS NFR BLD AUTO: 0.2 % (ref 0–1.5)
BILIRUB SERPL-MCNC: 0.4 MG/DL (ref 0.2–1.3)
BUN BLD-MCNC: 15 MG/DL (ref 7–23)
BUN/CREAT SERPL: 18.8 (ref 7–25)
CALCIUM SPEC-SCNC: 9.2 MG/DL (ref 8.4–10.2)
CHLORIDE SERPL-SCNC: 98 MMOL/L (ref 101–112)
CO2 SERPL-SCNC: 28 MMOL/L (ref 22–30)
CREAT BLD-MCNC: 0.8 MG/DL (ref 0.52–1.04)
DEPRECATED RDW RBC AUTO: 49 FL (ref 37–54)
EOSINOPHIL # BLD AUTO: 0.07 10*3/MM3 (ref 0–0.4)
EOSINOPHIL NFR BLD AUTO: 0.5 % (ref 0.3–6.2)
ERYTHROCYTE [DISTWIDTH] IN BLOOD BY AUTOMATED COUNT: 14.4 % (ref 12.3–15.4)
FLUAV AG NPH QL: NEGATIVE
FLUBV AG NPH QL IA: NEGATIVE
GFR SERPL CREATININE-BSD FRML MDRD: 68 ML/MIN/1.73 (ref 39–90)
GLOBULIN UR ELPH-MCNC: 3.8 GM/DL (ref 2.3–3.5)
GLUCOSE BLD-MCNC: 106 MG/DL (ref 70–99)
HCT VFR BLD AUTO: 33 % (ref 34–46.6)
HGB BLD-MCNC: 10.4 G/DL (ref 12–15.9)
LYMPHOCYTES # BLD AUTO: 1.53 10*3/MM3 (ref 0.7–3.1)
LYMPHOCYTES NFR BLD AUTO: 10.9 % (ref 19.6–45.3)
MCH RBC QN AUTO: 30.7 PG (ref 26.6–33)
MCHC RBC AUTO-ENTMCNC: 31.5 G/DL (ref 31.5–35.7)
MCV RBC AUTO: 97.3 FL (ref 79–97)
MONOCYTES # BLD AUTO: 1.84 10*3/MM3 (ref 0.1–0.9)
MONOCYTES NFR BLD AUTO: 13.1 % (ref 5–12)
NEUTROPHILS # BLD AUTO: 10.61 10*3/MM3 (ref 1.7–7)
NEUTROPHILS NFR BLD AUTO: 75.3 % (ref 42.7–76)
PLATELET # BLD AUTO: 395 10*3/MM3 (ref 140–450)
PMV BLD AUTO: 8.6 FL (ref 6–12)
POTASSIUM BLD-SCNC: 4.3 MMOL/L (ref 3.4–5)
PROT SERPL-MCNC: 7.8 G/DL (ref 6.3–8.6)
RBC # BLD AUTO: 3.39 10*6/MM3 (ref 3.77–5.28)
SODIUM BLD-SCNC: 135 MMOL/L (ref 137–145)
WBC NRBC COR # BLD: 14.08 10*3/MM3 (ref 3.4–10.8)

## 2019-11-14 PROCEDURE — 36415 COLL VENOUS BLD VENIPUNCTURE: CPT | Performed by: NURSE PRACTITIONER

## 2019-11-14 PROCEDURE — 99213 OFFICE O/P EST LOW 20 MIN: CPT | Performed by: NURSE PRACTITIONER

## 2019-11-14 PROCEDURE — 85025 COMPLETE CBC W/AUTO DIFF WBC: CPT | Performed by: NURSE PRACTITIONER

## 2019-11-14 PROCEDURE — 80053 COMPREHEN METABOLIC PANEL: CPT | Performed by: NURSE PRACTITIONER

## 2019-11-14 PROCEDURE — 87804 INFLUENZA ASSAY W/OPTIC: CPT | Performed by: NURSE PRACTITIONER

## 2019-11-14 RX ORDER — CEFDINIR 300 MG/1
300 CAPSULE ORAL 2 TIMES DAILY
Qty: 20 CAPSULE | Refills: 0 | Status: SHIPPED | OUTPATIENT
Start: 2019-11-14 | End: 2020-01-07

## 2019-11-14 NOTE — PROGRESS NOTES
"Subjective   Monserrat Downs is a 89 y.o. female. Patient here today with complaints of Follow-up  pt here today with daughter complaining of anterior chest wall pain since she fell last week and hit her chest on the highchair in her kitchen and then the floor. Today she also reports having a cough, has had sick contacts and she does have a fever today however she is unsure how long she has had fever. Does have rhinorrhea. Pt here desiring immunizations for flu and pneumonia. Has seen neurosurg dr webster who referred her to dr diallo. She has received IM injection which did not help her pain and is scheduled for epidural next Wednesday , off of ASA x 1 week prior to.     Vitals:    11/14/19 1406   BP: 112/72   Pulse: 87   Temp: 100.3 °F (37.9 °C)   SpO2: 94%   Weight: 50.8 kg (112 lb)   Height: 162.6 cm (64\")   PainSc:   2   PainLoc: Chest     Body mass index is 19.22 kg/m².  Past Medical History:   Diagnosis Date   • Abscess of buttock    • Anorectal abscess    • Benign essential hypertension    • Benign hypertension    • Carotid artery stenosis    • Cellulitis, abdominal wall    • Closed fracture of femur (CMS/HCC)    • COPD (chronic obstructive pulmonary disease) (CMS/HCC)    • Hip pain    • Hyperglycemia    • Hyperlipidemia    • Influenza vaccine needed    • Osteoarthritis    • Primary generalized (osteo)arthritis    • S/P breast biopsy, right      Cough   This is a new problem. The current episode started in the past 7 days. The problem has been unchanged. The problem occurs every few minutes. The cough is non-productive. Associated symptoms include chest pain (reports anterior chest wall pain after fall last week  ), a fever, nasal congestion and rhinorrhea. Pertinent negatives include no chills or shortness of breath. Nothing aggravates the symptoms. She has tried nothing for the symptoms. The treatment provided no relief.   Fever    This is a new problem. Episode onset: unknown  The maximum temperature noted " was 100 to 100.9 F. The temperature was taken using a tympanic thermometer. Associated symptoms include chest pain (reports anterior chest wall pain after fall last week  ), congestion and coughing. Pertinent negatives include no muscle aches or vomiting. She has tried nothing for the symptoms. The treatment provided no relief.        The following portions of the patient's history were reviewed and updated as appropriate: allergies, current medications, past family history, past medical history, past social history, past surgical history and problem list.    Review of Systems   Constitutional: Positive for fever. Negative for chills.   HENT: Positive for congestion and rhinorrhea.    Eyes: Negative.    Respiratory: Positive for cough. Negative for shortness of breath.    Cardiovascular: Positive for chest pain (reports anterior chest wall pain after fall last week  ).   Gastrointestinal: Negative.  Negative for vomiting.   Endocrine: Negative.    Genitourinary: Negative.    Musculoskeletal: Negative.    Skin: Negative.    Allergic/Immunologic: Negative.    Neurological: Negative.    Hematological: Negative.    Psychiatric/Behavioral: Negative.        Objective   Physical Exam   Constitutional: She is oriented to person, place, and time. She appears well-developed and well-nourished. No distress.   HENT:   Head: Normocephalic and atraumatic.   Cardiovascular: Normal rate, regular rhythm and normal heart sounds. Exam reveals no gallop and no friction rub.   No murmur heard.  Pulmonary/Chest: Effort normal. No stridor. No respiratory distress. She has decreased breath sounds in the right upper field, the right middle field, the right lower field, the left upper field, the left middle field and the left lower field. She has no wheezes. She has no rales. She exhibits tenderness.   Pulse ox on RA 94%   Neurological: She is alert and oriented to person, place, and time.   Skin: Skin is warm and dry. No rash noted. She is  not diaphoretic. No erythema. No pallor.   Psychiatric: She has a normal mood and affect. Her behavior is normal.   Nursing note and vitals reviewed.      Assessment/Plan   Monserrat was seen today for follow-up.    Diagnoses and all orders for this visit:    Acute URI    Cough  -     XR Chest 2 View  -     CBC & Differential; Future  -     Influenza Antigen, Rapid - Swab, Nasopharynx  -     Comprehensive metabolic panel; Future    Fever, unspecified fever cause    Leukocytosis, unspecified type    Other orders  -     cefdinir (OMNICEF) 300 MG capsule; Take 1 capsule by mouth 2 (Two) Times a Day.    will obtain cbc, cmp, flu swab and cxr as above  They are informed of results  She is treated with omnicef as above  Advised to monitor temp, give tylenol/motrin prn fever or pain and push fluids  If symptoms persist or worsen she is asked to RTC for recheck  Follow up with pain clinic for epidural as scheduled  Informed after she finishes antibiotic and symptoms resolve she can return for pneumonia and flu vaccines  They are aware and are in agreement to this plan  All questions and concerns are addressed with understanding noted.

## 2019-11-20 NOTE — PROGRESS NOTES
Pulmonary Office Follow-up     Monserrat Downs is seen in the office today for   Chief Complaint   Patient presents with   • UIP   .    Subjective     History of Present Illness  Monserrat Downs is a 89 y.o. female with a PMH significant for UIP, PAD, HTN, HLD, and osteoarthritis who presents for follow-up of UIP and chronic cough.     8/30/19: I recommended a trial of escalating Breo to the 200 dose given some perceived benefit and encouraged regular exercise.    11/22/19: Pt states she has not been feeling well this week. She went to her PCP for flu vaccine but she was not feeling well and had a fever. Pt did have a sore throat and myalgias so she was put on an abx. She checked with her pain doctor's office if she could still get her planned YAHAIRA. When she arrived for her injection the physician told her he would not perform the injection due to underlying infection. Pt has not has anymore fevers and her myalgias have resolved.  She does feel like the increase Breo has helped and she is using her albuterol only every other day.  Patient denies any cough, sputum, or leg swelling.  She continues to have some soreness in her chest but it is improving.  She has not gotten the flu vaccine yet due to her recent illness.    Review of Systems: History obtained from chart review and the patient.  Review of Systems   Constitutional: Positive for fatigue. Negative for fever and unexpected weight change.   HENT: Positive for hearing loss. Negative for congestion and postnasal drip.    Respiratory: Positive for shortness of breath. Negative for cough, chest tightness and wheezing.    Cardiovascular: Positive for chest pain. Negative for leg swelling.   Musculoskeletal: Positive for arthralgias and back pain.   Neurological: Positive for weakness.     As described in the HPI. Otherwise, remainder of ROS (14 systems) were negative.    Patient Active Problem List   Diagnosis   • UIP (usual interstitial pneumonitis) (CMS/HCC)   •  Cough   • Constipation   • Chronic pain of both knees   • Generalized OA   • Hyperglycemia   • Hyperlipidemia   • B12 deficiency   • Primary osteoarthritis of both knees   • Radiculopathy of leg   • Trochanteric bursitis of right hip   • Chronic low back pain   • Scoliosis (and kyphoscoliosis), idiopathic   • Abscess   • Perirectal abscess   • Carotid artery stenosis   • Benign essential hypertension   • Interstitial pneumonitis (CMS/HCC)   • Environmental allergies   • Advanced age         Current Outpatient Medications:   •  albuterol sulfate  (90 Base) MCG/ACT inhaler, Inhale 2 puffs Every 4 (Four) Hours As Needed for Wheezing or Shortness of Air., Disp: 3 inhaler, Rfl: 4  •  aspirin 81 MG EC tablet, 1 tablet, delayed release (DR/EC) at bedtime Oral, Disp: , Rfl:   •  atorvastatin (LIPITOR) 10 MG tablet, Take 1 tablet by mouth Daily., Disp: 90 tablet, Rfl: 1  •  Biotin 17939 MCG tablet dispersible, Take  by mouth., Disp: , Rfl:   •  Calcium Citrate-Vitamin D (CITRACAL + D PO), 1 tablet, chewable every other day Oral, Disp: , Rfl:   •  cefdinir (OMNICEF) 300 MG capsule, Take 1 capsule by mouth 2 (Two) Times a Day., Disp: 20 capsule, Rfl: 0  •  celecoxib (CeleBREX) 200 MG capsule, Take 1 capsule by mouth Daily., Disp: 90 capsule, Rfl: 1  •  Cyanocobalamin (B-12) 1000 MCG tablet, Take  by mouth., Disp: , Rfl:   •  Fluticasone Furoate-Vilanterol (BREO ELLIPTA) 200-25 MCG/INH inhaler, Inhale 1 puff Daily., Disp: 3 inhaler, Rfl: 4  •  Multiple Vitamins-Minerals (CENTRUM SILVER PO), 1 tablet every other day Oral, Disp: , Rfl:   •  omeprazole (priLOSEC) 40 MG capsule, Take 1 capsule by mouth Daily., Disp: 90 capsule, Rfl: 2  •  polyethylene glycol (MIRALAX) packet, Take 17 g by mouth Daily., Disp: , Rfl:   •  senna (SENOKOT) 8.6 MG tablet tablet, 2 tablet every day with supper Oral PRN, Disp: , Rfl:   •  traMADol (ULTRAM) 50 MG tablet, Take 1 tablet by mouth Every 6 (Six) Hours As Needed for Moderate Pain ., Disp:  "60 tablet, Rfl: 0    No Known Allergies    Past Medical History:   Diagnosis Date   • Abscess of buttock    • Anorectal abscess    • Benign essential hypertension    • Benign hypertension    • Carotid artery stenosis    • Cellulitis, abdominal wall    • Closed fracture of femur (CMS/HCC)    • COPD (chronic obstructive pulmonary disease) (CMS/HCC)    • Hip pain    • Hyperglycemia    • Hyperlipidemia    • Influenza vaccine needed    • Osteoarthritis    • Primary generalized (osteo)arthritis    • S/P breast biopsy, right      Past Surgical History:   Procedure Laterality Date   • INJECTION OF MEDICATION  07/12/2016    B12 (3)   • INJECTION OF MEDICATION  05/04/2016    Depo Medrol (Methylprednisone) (1)   • INJECTION OF MEDICATION  10/27/2015    Depo Medrol (Methylprednisone) 80mg (8)   • INJECTION OF MEDICATION  10/03/2012    Rocephin (1)   • INJECTION OF MEDICATION  06/26/2015    Toradol (6)   • INTRAMEDULLARY NAILING/RODDING HUMERUS, TIBIA, FEMUR  03/14/2014    Orthotic procedure     Social History     Socioeconomic History   • Marital status:      Spouse name: Neha   • Number of children: 3   • Years of education: Not on file   • Highest education level: Not on file   Tobacco Use   • Smoking status: Never Smoker   • Smokeless tobacco: Never Used   Substance and Sexual Activity   • Alcohol use: No   • Drug use: No   • Sexual activity: Not Currently     Partners: Male     Family History   Problem Relation Age of Onset   • Ovarian cancer Mother         went to colon   • Lung cancer Father           Objective     Blood pressure 127/54, pulse 77, height 162.6 cm (64\"), weight 48.1 kg (106 lb), SpO2 96 %, not currently breastfeeding.  Physical Exam   Constitutional: She is oriented to person, place, and time. Vital signs are normal. She appears well-developed and well-nourished.   HENT:   Head: Normocephalic and atraumatic.   Nose: Nose normal. No mucosal edema or rhinorrhea.   Mouth/Throat: Uvula is midline, " oropharynx is clear and moist and mucous membranes are normal.   Mallampati 1   Eyes: Conjunctivae, EOM and lids are normal. Pupils are equal, round, and reactive to light.   Eyeglasses   Neck: Trachea normal and normal range of motion. No tracheal tenderness present. No thyroid mass present.   Cardiovascular: Normal rate, regular rhythm and normal heart sounds. PMI is not displaced. Exam reveals no gallop.   No murmur heard.  Pulmonary/Chest: Effort normal and breath sounds normal. No respiratory distress. She has no decreased breath sounds. She has no wheezes. She has no rhonchi. She exhibits no tenderness.   Abdominal: Soft. Normal appearance and bowel sounds are normal. There is no hepatomegaly. There is no tenderness.   Musculoskeletal:   In wheelchair, no extremity edema     Vascular Status -  Her right foot exhibits no edema. Her left foot exhibits no edema.  Lymphadenopathy:        Head (right side): No submandibular adenopathy present.        Head (left side): No submandibular adenopathy present.     She has no cervical adenopathy.        Right: No supraclavicular adenopathy present.        Left: No supraclavicular adenopathy present.   Neurological: She is alert and oriented to person, place, and time.   Skin: Skin is warm and dry. No rash noted. No cyanosis. Nails show no clubbing.   Psychiatric: She has a normal mood and affect. Her speech is normal and behavior is normal. Judgment normal.   Nursing note and vitals reviewed.      PFTs: 5/2/19 (independently reviewed and interpreted by me)  Ratio 82  FVC 2.28/ 101%  FEV1 1.86/ 113%  TLC 3.82/ 75%  DLCO 45.41/ 187%  Normal spirometry.  Mildly reduced TLC suggesting early restriction.  Supranormal diffusing capacity which may be due to leak during testing.  No comparative data available.    Radiology (independently reviewed and interpreted by me): CT chest 4/8/19 showed peripheral and basilar predominant fibrotic changes with some areas of honeycombing at  the bases consistent with UIP    CXR 11/14/19: Stable bilateral chronic interstitial changes consistent with known pulmonary fibrosis     Assessment/Plan     Monserrat was seen today for uip.    Diagnoses and all orders for this visit:    UIP (usual interstitial pneumonitis) (CMS/[facility])  -     albuterol sulfate  (90 Base) MCG/ACT inhaler; Inhale 2 puffs Every 4 (Four) Hours As Needed for Wheezing or Shortness of Air.  -     Fluticasone Furoate-Vilanterol (BREO ELLIPTA) 200-25 MCG/INH inhaler; Inhale 1 puff Daily.    Environmental allergies    Advanced age    Musculoskeletal chest pain         Discussion/ Recommendations:   Despite her recent illness which was likely viral in nature, she is returning to baseline and she has perceived benefit from the increased Breo.  I have recommended that she continue on Breo 200 and encouraged her to get the flu vaccine as soon as possible.  I again counseled the patient that I would not recommend anti-fibrotic therapy given her advanced age and likelihood of not tolerating treatment.    -Continue Breo 200 daily.  Rinse mouth well after use.  -Use albuterol as needed for dyspnea, wheeze, chest tightness, or cough.  -No direct therapy at pulmonary fibrosis.  -Encouraged regular, progressive exercise as tolerated.  -Conservative therapy for musculoskeletal chest pain.  -Up-to-date with flu and pneumococcal vaccines.  Encouraged patient to get annual flu vaccine soon.      Patient's Body mass index is 18.19 kg/m². BMI is below normal parameters. Recommendations include: treating the underlying disease process.           Return in about 3 months (around 2/22/2020) for Recheck UIP.      Thank you for allowing me to participate in the care of Monserrat Downs. Please do not hesitate to contact me with any questions.         This document has been electronically signed by Casi Sparks MD on November 22, 2019 4:00 PM      Dictated using Dragon

## 2019-11-22 ENCOUNTER — OFFICE VISIT (OUTPATIENT)
Dept: PULMONOLOGY | Facility: CLINIC | Age: 84
End: 2019-11-22

## 2019-11-22 VITALS
BODY MASS INDEX: 18.1 KG/M2 | OXYGEN SATURATION: 96 % | SYSTOLIC BLOOD PRESSURE: 127 MMHG | DIASTOLIC BLOOD PRESSURE: 54 MMHG | WEIGHT: 106 LBS | HEIGHT: 64 IN | HEART RATE: 77 BPM

## 2019-11-22 DIAGNOSIS — R07.89 MUSCULOSKELETAL CHEST PAIN: ICD-10-CM

## 2019-11-22 DIAGNOSIS — R54 ADVANCED AGE: ICD-10-CM

## 2019-11-22 DIAGNOSIS — J84.112 UIP (USUAL INTERSTITIAL PNEUMONITIS) (HCC): Primary | ICD-10-CM

## 2019-11-22 DIAGNOSIS — Z91.09 ENVIRONMENTAL ALLERGIES: ICD-10-CM

## 2019-11-22 PROCEDURE — 99214 OFFICE O/P EST MOD 30 MIN: CPT | Performed by: INTERNAL MEDICINE

## 2019-11-22 RX ORDER — ALBUTEROL SULFATE 90 UG/1
2 AEROSOL, METERED RESPIRATORY (INHALATION) EVERY 4 HOURS PRN
Qty: 3 INHALER | Refills: 4 | Status: SHIPPED | OUTPATIENT
Start: 2019-11-22 | End: 2020-10-30 | Stop reason: SDUPTHER

## 2019-12-19 DIAGNOSIS — E78.5 HYPERLIPIDEMIA, UNSPECIFIED HYPERLIPIDEMIA TYPE: Primary | ICD-10-CM

## 2020-01-07 ENCOUNTER — OFFICE VISIT (OUTPATIENT)
Dept: FAMILY MEDICINE CLINIC | Facility: CLINIC | Age: 85
End: 2020-01-07

## 2020-01-07 VITALS
HEIGHT: 64 IN | DIASTOLIC BLOOD PRESSURE: 70 MMHG | BODY MASS INDEX: 17.89 KG/M2 | WEIGHT: 104.8 LBS | HEART RATE: 79 BPM | TEMPERATURE: 98.4 F | SYSTOLIC BLOOD PRESSURE: 112 MMHG

## 2020-01-07 DIAGNOSIS — M54.10 RADICULOPATHY OF LEG: ICD-10-CM

## 2020-01-07 DIAGNOSIS — M25.551 PAIN OF RIGHT HIP JOINT: Primary | ICD-10-CM

## 2020-01-07 DIAGNOSIS — M15.9 GENERALIZED OA: ICD-10-CM

## 2020-01-07 DIAGNOSIS — G89.29 CHRONIC LOW BACK PAIN, UNSPECIFIED BACK PAIN LATERALITY, UNSPECIFIED WHETHER SCIATICA PRESENT: ICD-10-CM

## 2020-01-07 DIAGNOSIS — M51.26 PROTRUSION OF LUMBAR INTERVERTEBRAL DISC: ICD-10-CM

## 2020-01-07 DIAGNOSIS — M54.50 CHRONIC LOW BACK PAIN, UNSPECIFIED BACK PAIN LATERALITY, UNSPECIFIED WHETHER SCIATICA PRESENT: ICD-10-CM

## 2020-01-07 LAB
ALBUMIN SERPL-MCNC: 4.4 G/DL (ref 3.5–5)
ALBUMIN/GLOB SERPL: 1.1 G/DL (ref 1.1–1.8)
ALP SERPL-CCNC: 68 U/L (ref 38–126)
ALT SERPL W P-5'-P-CCNC: 12 U/L
ANION GAP SERPL CALCULATED.3IONS-SCNC: 5 MMOL/L (ref 5–15)
AST SERPL-CCNC: 24 U/L (ref 14–36)
BASOPHILS # BLD AUTO: 0.07 10*3/MM3 (ref 0–0.2)
BASOPHILS NFR BLD AUTO: 0.7 % (ref 0–1.5)
BILIRUB SERPL-MCNC: 0.5 MG/DL (ref 0.2–1.3)
BUN BLD-MCNC: 11 MG/DL (ref 7–23)
BUN/CREAT SERPL: 15.7 (ref 7–25)
CALCIUM SPEC-SCNC: 9.8 MG/DL (ref 8.4–10.2)
CHLORIDE SERPL-SCNC: 101 MMOL/L (ref 101–112)
CHOLEST SERPL-MCNC: 174 MG/DL (ref 150–200)
CO2 SERPL-SCNC: 33 MMOL/L (ref 22–30)
CREAT BLD-MCNC: 0.7 MG/DL (ref 0.52–1.04)
DEPRECATED RDW RBC AUTO: 50.5 FL (ref 37–54)
EOSINOPHIL # BLD AUTO: 0.25 10*3/MM3 (ref 0–0.4)
EOSINOPHIL NFR BLD AUTO: 2.7 % (ref 0.3–6.2)
ERYTHROCYTE [DISTWIDTH] IN BLOOD BY AUTOMATED COUNT: 14.9 % (ref 12.3–15.4)
GFR SERPL CREATININE-BSD FRML MDRD: 79 ML/MIN/1.73 (ref 39–90)
GLOBULIN UR ELPH-MCNC: 3.9 GM/DL (ref 2.3–3.5)
GLUCOSE BLD-MCNC: 93 MG/DL (ref 70–99)
HCT VFR BLD AUTO: 37.6 % (ref 34–46.6)
HDLC SERPL-MCNC: 62 MG/DL (ref 40–59)
HGB BLD-MCNC: 12 G/DL (ref 12–15.9)
LDLC SERPL CALC-MCNC: 90 MG/DL
LDLC/HDLC SERPL: 1.45 {RATIO} (ref 0–3.22)
LYMPHOCYTES # BLD AUTO: 2.87 10*3/MM3 (ref 0.7–3.1)
LYMPHOCYTES NFR BLD AUTO: 30.6 % (ref 19.6–45.3)
MCH RBC QN AUTO: 30.7 PG (ref 26.6–33)
MCHC RBC AUTO-ENTMCNC: 31.9 G/DL (ref 31.5–35.7)
MCV RBC AUTO: 96.2 FL (ref 79–97)
MONOCYTES # BLD AUTO: 0.83 10*3/MM3 (ref 0.1–0.9)
MONOCYTES NFR BLD AUTO: 8.8 % (ref 5–12)
NEUTROPHILS # BLD AUTO: 5.36 10*3/MM3 (ref 1.7–7)
NEUTROPHILS NFR BLD AUTO: 57.2 % (ref 42.7–76)
PLATELET # BLD AUTO: 455 10*3/MM3 (ref 140–450)
PMV BLD AUTO: 8.4 FL (ref 6–12)
POTASSIUM BLD-SCNC: 4.8 MMOL/L (ref 3.4–5)
PROT SERPL-MCNC: 8.3 G/DL (ref 6.3–8.6)
RBC # BLD AUTO: 3.91 10*6/MM3 (ref 3.77–5.28)
SODIUM BLD-SCNC: 139 MMOL/L (ref 137–145)
TRIGL SERPL-MCNC: 111 MG/DL
VLDLC SERPL-MCNC: 22.2 MG/DL
WBC NRBC COR # BLD: 9.38 10*3/MM3 (ref 3.4–10.8)

## 2020-01-07 PROCEDURE — G0008 ADMIN INFLUENZA VIRUS VAC: HCPCS | Performed by: NURSE PRACTITIONER

## 2020-01-07 PROCEDURE — 84443 ASSAY THYROID STIM HORMONE: CPT | Performed by: NURSE PRACTITIONER

## 2020-01-07 PROCEDURE — 80061 LIPID PANEL: CPT | Performed by: NURSE PRACTITIONER

## 2020-01-07 PROCEDURE — 84481 FREE ASSAY (FT-3): CPT | Performed by: NURSE PRACTITIONER

## 2020-01-07 PROCEDURE — 96372 THER/PROPH/DIAG INJ SC/IM: CPT | Performed by: NURSE PRACTITIONER

## 2020-01-07 PROCEDURE — 80053 COMPREHEN METABOLIC PANEL: CPT | Performed by: NURSE PRACTITIONER

## 2020-01-07 PROCEDURE — 99213 OFFICE O/P EST LOW 20 MIN: CPT | Performed by: NURSE PRACTITIONER

## 2020-01-07 PROCEDURE — 36415 COLL VENOUS BLD VENIPUNCTURE: CPT | Performed by: NURSE PRACTITIONER

## 2020-01-07 PROCEDURE — 90653 IIV ADJUVANT VACCINE IM: CPT | Performed by: NURSE PRACTITIONER

## 2020-01-07 PROCEDURE — 84436 ASSAY OF TOTAL THYROXINE: CPT | Performed by: NURSE PRACTITIONER

## 2020-01-07 PROCEDURE — 85025 COMPLETE CBC W/AUTO DIFF WBC: CPT | Performed by: NURSE PRACTITIONER

## 2020-01-07 RX ORDER — KETOROLAC TROMETHAMINE 30 MG/ML
30 INJECTION, SOLUTION INTRAMUSCULAR; INTRAVENOUS ONCE
Status: COMPLETED | OUTPATIENT
Start: 2020-01-07 | End: 2020-01-07

## 2020-01-07 RX ORDER — METHYLPREDNISOLONE ACETATE 80 MG/ML
80 INJECTION, SUSPENSION INTRA-ARTICULAR; INTRALESIONAL; INTRAMUSCULAR; SOFT TISSUE ONCE
Status: COMPLETED | OUTPATIENT
Start: 2020-01-07 | End: 2020-01-07

## 2020-01-07 RX ADMIN — METHYLPREDNISOLONE ACETATE 80 MG: 80 INJECTION, SUSPENSION INTRA-ARTICULAR; INTRALESIONAL; INTRAMUSCULAR; SOFT TISSUE at 11:39

## 2020-01-07 RX ADMIN — KETOROLAC TROMETHAMINE 30 MG: 30 INJECTION, SOLUTION INTRAMUSCULAR; INTRAVENOUS at 11:39

## 2020-01-07 NOTE — PROGRESS NOTES
"Subjective   Monserrat Downs is a 89 y.o. female. Patient here today with complaints of Follow-up  Patient here today with daughter complaining of continued low back pain, she has had x-rays and previous MRI which revealed disc protrusions, she has seen neurosurgery and has been referred to Dr. Muhammad but has been unable to obtain epidurals thus far.  Requesting possible referral to closer pain clinic to receive epidurals, has received benefit from Toradol and steroid injections in the past, requesting flu shot today.  She does have Ultram to take as needed pain but this causes constipation therefore she only takes 1/day.    Vitals:    01/07/20 1104   BP: 112/70   Pulse: 79   Temp: 98.4 °F (36.9 °C)   Weight: 47.5 kg (104 lb 12.8 oz)   Height: 162.6 cm (64\")   PainSc:   4   PainLoc: Hip     Body mass index is 17.99 kg/m².  Past Medical History:   Diagnosis Date   • Abscess of buttock    • Anorectal abscess    • Benign essential hypertension    • Benign hypertension    • Carotid artery stenosis    • Cellulitis, abdominal wall    • Closed fracture of femur (CMS/HCC)    • COPD (chronic obstructive pulmonary disease) (CMS/HCC)    • Hip pain    • Hyperglycemia    • Hyperlipidemia    • Influenza vaccine needed    • Osteoarthritis    • Primary generalized (osteo)arthritis    • S/P breast biopsy, right      Back Pain   This is a chronic problem. The current episode started more than 1 year ago. The problem occurs constantly. The problem has been waxing and waning since onset. The pain is present in the lumbar spine, sacro-iliac and thoracic spine. The quality of the pain is described as aching and shooting. The pain is at a severity of 4/10. The pain is the same all the time. The symptoms are aggravated by position, bending and twisting. She has tried NSAIDs, chiropractic manipulation, analgesics and bed rest for the symptoms. The treatment provided mild relief.        The following portions of the patient's history were " reviewed and updated as appropriate: allergies, current medications, past family history, past medical history, past social history, past surgical history and problem list.    Review of Systems   Constitutional: Positive for appetite change and unexpected weight change (states has not been eating as much she thinks due to pain ).   HENT: Negative.    Eyes: Negative.    Respiratory: Negative.    Cardiovascular: Negative.    Gastrointestinal: Negative.    Endocrine: Negative.    Genitourinary: Negative.    Musculoskeletal: Positive for arthralgias, back pain and gait problem.   Skin: Negative.    Allergic/Immunologic: Negative.    Hematological: Negative.    Psychiatric/Behavioral: Negative.        Objective   Physical Exam   Constitutional: She is oriented to person, place, and time. She appears well-developed and well-nourished. No distress.   HENT:   Head: Normocephalic and atraumatic.   Cardiovascular: Normal rate, regular rhythm and normal heart sounds. Exam reveals no gallop and no friction rub.   No murmur heard.  Pulmonary/Chest: Effort normal and breath sounds normal. No stridor. No respiratory distress. She has no wheezes. She has no rales.   Musculoskeletal: She exhibits tenderness.        Lumbar back: She exhibits decreased range of motion, tenderness and bony tenderness.   Pt ambulatory with assist of walker, gait guarded but fairly steady    Neurological: She is alert and oriented to person, place, and time.   Skin: Skin is warm and dry. She is not diaphoretic.   Psychiatric: She has a normal mood and affect. Her behavior is normal.   Nursing note and vitals reviewed.      Assessment/Plan   Monserrat was seen today for follow-up.    Diagnoses and all orders for this visit:    Pain of right hip joint  -     ketorolac (TORADOL) injection 30 mg  -     methylPREDNISolone acetate (DEPO-medrol) injection 80 mg  -     Ambulatory Referral to Pain Management    Radiculopathy of leg  -     Ambulatory Referral to Pain  Management    Chronic low back pain, unspecified back pain laterality, unspecified whether sciatica present  -     Ambulatory Referral to Pain Management    Generalized OA  -     Ambulatory Referral to Pain Management    Protrusion of lumbar intervertebral disc  -     Ambulatory Referral to Pain Management    Other orders  -     Fluad Tri 65yr+    she is given flu shot in office today, yasmani well  previous MRI results reviewed again   She is given depo medrol inj IM in office today, toradol inj IM in office today and yasmani both well  She has had both of these in the past and yasmani them well also  She is offered other pain medicine, also advised to increase ultram to q6 hours prn pain however she states she does not want to take due to constipation side effects  She will RTC in 3 months for recheck and is informed she cannot have steroids inj any closer than q3 months  They are aware and are in agreement to this plan  All questions and concerns are addressed with understanding noted.   Local pain management contacted,

## 2020-01-08 LAB
T3FREE SERPL-MCNC: 3.08 PG/ML (ref 2–4.4)
T4 SERPL-MCNC: 9.23 MCG/DL (ref 4.5–11.7)
TSH SERPL DL<=0.05 MIU/L-ACNC: 4.3 UIU/ML (ref 0.27–4.2)

## 2020-02-10 DIAGNOSIS — M15.9 GENERALIZED OA: ICD-10-CM

## 2020-02-10 DIAGNOSIS — G89.29 CHRONIC LOW BACK PAIN, UNSPECIFIED BACK PAIN LATERALITY, UNSPECIFIED WHETHER SCIATICA PRESENT: Primary | ICD-10-CM

## 2020-02-10 DIAGNOSIS — M54.50 CHRONIC LOW BACK PAIN, UNSPECIFIED BACK PAIN LATERALITY, UNSPECIFIED WHETHER SCIATICA PRESENT: Primary | ICD-10-CM

## 2020-02-10 RX ORDER — TRAMADOL HYDROCHLORIDE 50 MG/1
50 TABLET ORAL EVERY 6 HOURS PRN
Qty: 60 TABLET | Refills: 0 | Status: SHIPPED | OUTPATIENT
Start: 2020-02-10 | End: 2020-11-03

## 2020-02-19 NOTE — PROGRESS NOTES
Pulmonary Office Follow-up     Monserrat Downs is seen in the office today for   Chief Complaint   Patient presents with   • UIP   .    Subjective     History of Present Illness  Monserrat Downs is a 89 y.o. female with a PMH significant for UIP, PAD, HTN, HLD, and osteoarthritis who presents for follow-up of UIP and chronic cough.     8/30/19: I recommended a trial of escalating Breo to the 200 dose given some perceived benefit and encouraged regular exercise.    11/22/19: She was recovering from a recent viral illness, and was perceiving benefit from the increased dose of Breo so I recommended continuing with it.  I did not recommend any directed therapies that her underlying pulmonary fibrosis but did encourage her to start exercising more regularly.    2/20/20: She states that she is feeling better.  She still has some cough, but it is improved.  She denies any sputum production or chest pain.  Patient continues on Breo daily and states that she uses her albuterol once every few days for her cough.  She does get relief by using the albuterol.  Patient reports that she has had some increased appetite and that her weight is stable  She has gotten in with a new pain management physician is scheduled to have an epidural injection next  Monday.  She denies any recent illnesses or antibiotic use.  Patient states that she is trying to move around more and feels like it is helping.  She has been seeing a chiropractor and he suggested some exercises which she feels like is helping build her strength.      Review of Systems: History obtained from chart review and the patient.  Review of Systems   Constitutional: Negative for fatigue, fever and unexpected weight change.   HENT: Positive for hearing loss. Negative for congestion and postnasal drip.    Respiratory: Positive for shortness of breath. Negative for cough, chest tightness and wheezing.    Cardiovascular: Negative for chest pain and leg swelling.    Musculoskeletal: Positive for arthralgias and back pain.   Neurological: Positive for weakness.     As described in the HPI. Otherwise, remainder of ROS (14 systems) were negative.    Patient Active Problem List   Diagnosis   • UIP (usual interstitial pneumonitis) (CMS/HCC)   • Cough   • Constipation   • Chronic pain of both knees   • Generalized OA   • Hyperglycemia   • Hyperlipidemia   • B12 deficiency   • Primary osteoarthritis of both knees   • Radiculopathy of leg   • Trochanteric bursitis of right hip   • Chronic low back pain   • Scoliosis (and kyphoscoliosis), idiopathic   • Abscess   • Perirectal abscess   • Carotid artery stenosis   • Benign essential hypertension   • Interstitial pneumonitis (CMS/HCC)   • Environmental allergies   • Advanced age   • Mild intermittent asthma without complication         Current Outpatient Medications:   •  albuterol sulfate  (90 Base) MCG/ACT inhaler, Inhale 2 puffs Every 4 (Four) Hours As Needed for Wheezing or Shortness of Air., Disp: 3 inhaler, Rfl: 4  •  aspirin 81 MG EC tablet, 1 tablet, delayed release (DR/EC) at bedtime Oral, Disp: , Rfl:   •  atorvastatin (LIPITOR) 10 MG tablet, Take 1 tablet by mouth Daily., Disp: 90 tablet, Rfl: 1  •  Biotin 94694 MCG tablet dispersible, Take  by mouth., Disp: , Rfl:   •  Calcium Citrate-Vitamin D (CITRACAL + D PO), 1 tablet, chewable every other day Oral, Disp: , Rfl:   •  celecoxib (CeleBREX) 200 MG capsule, Take 1 capsule by mouth Daily., Disp: 90 capsule, Rfl: 1  •  Cyanocobalamin (B-12) 1000 MCG tablet, Take  by mouth., Disp: , Rfl:   •  Fluticasone Furoate-Vilanterol (BREO ELLIPTA) 200-25 MCG/INH inhaler, Inhale 1 puff Daily., Disp: 3 inhaler, Rfl: 4  •  Multiple Vitamins-Minerals (CENTRUM SILVER PO), 1 tablet every other day Oral, Disp: , Rfl:   •  omeprazole (priLOSEC) 40 MG capsule, Take 1 capsule by mouth Daily., Disp: 90 capsule, Rfl: 2  •  polyethylene glycol (MIRALAX) packet, Take 17 g by mouth Daily., Disp:  ", Rfl:   •  senna (SENOKOT) 8.6 MG tablet tablet, 2 tablet every day with supper Oral PRN, Disp: , Rfl:   •  traMADol (ULTRAM) 50 MG tablet, Take 1 tablet by mouth Every 6 (Six) Hours As Needed for Moderate Pain ., Disp: 60 tablet, Rfl: 0    No Known Allergies    Past Medical History:   Diagnosis Date   • Abscess of buttock    • Anorectal abscess    • Benign essential hypertension    • Benign hypertension    • Carotid artery stenosis    • Cellulitis, abdominal wall    • Closed fracture of femur (CMS/HCC)    • COPD (chronic obstructive pulmonary disease) (CMS/HCC)    • Hip pain    • Hyperglycemia    • Hyperlipidemia    • Influenza vaccine needed    • Osteoarthritis    • Primary generalized (osteo)arthritis    • S/P breast biopsy, right      Past Surgical History:   Procedure Laterality Date   • INJECTION OF MEDICATION  07/12/2016    B12 (3)   • INJECTION OF MEDICATION  05/04/2016    Depo Medrol (Methylprednisone) (1)   • INJECTION OF MEDICATION  10/27/2015    Depo Medrol (Methylprednisone) 80mg (8)   • INJECTION OF MEDICATION  10/03/2012    Rocephin (1)   • INJECTION OF MEDICATION  06/26/2015    Toradol (6)   • INTRAMEDULLARY NAILING/RODDING HUMERUS, TIBIA, FEMUR  03/14/2014    Orthotic procedure     Social History     Socioeconomic History   • Marital status:      Spouse name: Neha   • Number of children: 3   • Years of education: Not on file   • Highest education level: Not on file   Tobacco Use   • Smoking status: Never Smoker   • Smokeless tobacco: Never Used   Substance and Sexual Activity   • Alcohol use: No   • Drug use: No   • Sexual activity: Not Currently     Partners: Male     Family History   Problem Relation Age of Onset   • Ovarian cancer Mother         went to colon   • Lung cancer Father           Objective     Blood pressure 118/64, pulse 82, height 162.6 cm (64\"), weight 47.2 kg (104 lb), SpO2 96 %, not currently breastfeeding.  Physical Exam   Constitutional: She is oriented to person, " place, and time. Vital signs are normal. She appears well-developed and well-nourished.   HENT:   Head: Normocephalic and atraumatic.   Nose: Nose normal. No mucosal edema or rhinorrhea.   Mouth/Throat: Uvula is midline, oropharynx is clear and moist and mucous membranes are normal.   Mallampati 1   Eyes: Pupils are equal, round, and reactive to light. Conjunctivae, EOM and lids are normal.   Eyeglasses   Neck: Trachea normal and normal range of motion. No tracheal tenderness present. No thyroid mass present.   Cardiovascular: Normal rate, regular rhythm and normal heart sounds. PMI is not displaced. Exam reveals no gallop.   No murmur heard.  Pulmonary/Chest: Effort normal and breath sounds normal. No respiratory distress. She has no decreased breath sounds. She has no wheezes. She has no rhonchi. She exhibits no tenderness.   Mild cough with deep breathing   Abdominal: Soft. Normal appearance and bowel sounds are normal. There is no hepatomegaly. There is no tenderness.   Musculoskeletal:   In wheelchair, no extremity edema     Vascular Status -  Her right foot exhibits no edema. Her left foot exhibits no edema.  Lymphadenopathy:        Head (right side): No submandibular adenopathy present.        Head (left side): No submandibular adenopathy present.     She has no cervical adenopathy.        Right: No supraclavicular adenopathy present.        Left: No supraclavicular adenopathy present.   Neurological: She is alert and oriented to person, place, and time.   Skin: Skin is warm and dry. No rash noted. No cyanosis. Nails show no clubbing.   Psychiatric: She has a normal mood and affect. Her speech is normal and behavior is normal. Judgment normal.   Nursing note and vitals reviewed.      PFTs: 5/2/19 (independently reviewed and interpreted by me)  Ratio 82  FVC 2.28/ 101%  FEV1 1.86/ 113%  TLC 3.82/ 75%  DLCO 45.41/ 187%  Normal spirometry.  Mildly reduced TLC suggesting early restriction.  Supranormal diffusing  capacity which may be due to leak during testing.  No comparative data available.    Radiology (independently reviewed and interpreted by me): CT chest 4/8/19 showed peripheral and basilar predominant fibrotic changes with some areas of honeycombing at the bases consistent with UIP    CXR 11/14/19: Stable bilateral chronic interstitial changes consistent with known pulmonary fibrosis     Assessment/Plan     Monserrat was seen today for uip.    Diagnoses and all orders for this visit:    UIP (usual interstitial pneumonitis) (CMS/HCC)    Cough    Mild intermittent asthma without complication    Environmental allergies    Physical deconditioning         Discussion/ Recommendations:   She is actually doing well and her fatigue and cough have improved.  I do think she is getting benefit from the Breo so she likely does have a component of underlying asthma.  I counseled her that while her cough could be due to underlying asthma, I think it is possibly secondary to her fibrosis.  She is not having a bothersome cough at this time so I think is reasonable to avoid symptomatic therapy.  Otherwise, I have encouraged her to continue efforts with increasing her exertional level to help with her deconditioning..    -Continue Breo 200 daily.    -Use albuterol as needed for dyspnea, wheeze, chest tightness, or cough.  -No direct therapy at pulmonary fibrosis.  -If cough becomes more persistent and is impacting sleep, I would recommend symptomatic treatment with a cough suppressant.  -Encouraged regular, progressive exercise as tolerated.  -Epidural injection for low back pain per pain management.  -Up-to-date with flu and pneumococcal vaccines.      Patient's Body mass index is 17.85 kg/m². BMI is below normal parameters. Recommendations include: treating the underlying disease process.           Return in about 3 months (around 5/20/2020) for Recheck UIP, asthma.      Thank you for allowing me to participate in the care of Monserrat Reis  Day. Please do not hesitate to contact me with any questions.         This document has been electronically signed by Casi Sparks MD on February 20, 2020 11:53 AM      Dictated using Dragon

## 2020-02-20 ENCOUNTER — OFFICE VISIT (OUTPATIENT)
Dept: PULMONOLOGY | Facility: CLINIC | Age: 85
End: 2020-02-20

## 2020-02-20 VITALS
OXYGEN SATURATION: 96 % | BODY MASS INDEX: 17.75 KG/M2 | SYSTOLIC BLOOD PRESSURE: 118 MMHG | WEIGHT: 104 LBS | HEIGHT: 64 IN | DIASTOLIC BLOOD PRESSURE: 64 MMHG | HEART RATE: 82 BPM

## 2020-02-20 DIAGNOSIS — Z91.09 ENVIRONMENTAL ALLERGIES: ICD-10-CM

## 2020-02-20 DIAGNOSIS — R53.81 PHYSICAL DECONDITIONING: ICD-10-CM

## 2020-02-20 DIAGNOSIS — J45.20 MILD INTERMITTENT ASTHMA WITHOUT COMPLICATION: ICD-10-CM

## 2020-02-20 DIAGNOSIS — J84.112 UIP (USUAL INTERSTITIAL PNEUMONITIS) (HCC): Primary | ICD-10-CM

## 2020-02-20 DIAGNOSIS — R05.9 COUGH: ICD-10-CM

## 2020-02-20 PROCEDURE — 99214 OFFICE O/P EST MOD 30 MIN: CPT | Performed by: INTERNAL MEDICINE

## 2020-03-09 RX ORDER — CELECOXIB 200 MG/1
CAPSULE ORAL
Qty: 90 CAPSULE | Refills: 3 | Status: SHIPPED | OUTPATIENT
Start: 2020-03-09 | End: 2021-03-22

## 2020-03-09 RX ORDER — ATORVASTATIN CALCIUM 10 MG/1
TABLET, FILM COATED ORAL
Qty: 90 TABLET | Refills: 3 | Status: SHIPPED | OUTPATIENT
Start: 2020-03-09 | End: 2021-03-03

## 2020-06-08 RX ORDER — OMEPRAZOLE 40 MG/1
40 CAPSULE, DELAYED RELEASE ORAL DAILY
Qty: 90 CAPSULE | Refills: 2 | Status: SHIPPED | OUTPATIENT
Start: 2020-06-08 | End: 2021-06-01

## 2020-07-07 NOTE — PROGRESS NOTES
Pulmonary Office Follow-up     Monserrat Downs is seen in the office today for   Chief Complaint   Patient presents with   • UIP   .    Subjective     History of Present Illness  Monserrat Downs is a 89 y.o. female with a PMH significant for UIP, PAD, HTN, HLD, and osteoarthritis who presents for follow-up of UIP and chronic cough.     8/30/19: I recommended a trial of escalating Breo to the 200 dose given some perceived benefit and encouraged regular exercise.    11/22/19: She was recovering from a recent viral illness, and was perceiving benefit from the increased dose of Breo so I recommended continuing with it.  I did not recommend any directed therapies that her underlying pulmonary fibrosis but did encourage her to start exercising more regularly.    2/20/20: She was doing fairly well with improvement in her fatigue and cough so I recommended continuing on her Breo.  Otherwise, I recommend she continue with her progressive exercise as tolerated to help with her deconditioning.    7/8/20: She states that she had worsening fatigue as well as cough productive of sputum and some fevers of her family took her to the [facility] a few weeks ago.  Patient was diagnosed with a slight pneumonia and prescribed cefdinir as well as azithromycin.  She states that her symptoms have improved but she continues to feel fatigued.  She still has her cough but is back to baseline and nonproductive.  She is still using her Breo daily but sometimes has to use her albuterol for her cough.  Patient does get benefit from the albuterol using it for cough.  She denies any recent fevers or chills.  She does admit to some weight loss when she was sick but her appetite is picking back up.  Patient was tested for COVID 19 and was negative.      Review of Systems: History obtained from chart review and the patient.  Review of Systems   Constitutional: Positive for fatigue and unexpected weight change. Negative for fever.   HENT: Positive  for hearing loss. Negative for congestion and postnasal drip.    Respiratory: Positive for cough and shortness of breath. Negative for chest tightness and wheezing.    Cardiovascular: Negative for chest pain and leg swelling.   Neurological: Positive for weakness.     As described in the HPI. Otherwise, remainder of ROS (14 systems) were negative.    Patient Active Problem List   Diagnosis   • UIP (usual interstitial pneumonitis) (CMS/HCC)   • Chronic cough   • Constipation   • Chronic pain of both knees   • Generalized OA   • Hyperglycemia   • Hyperlipidemia   • B12 deficiency   • Primary osteoarthritis of both knees   • Radiculopathy of leg   • Trochanteric bursitis of right hip   • Chronic low back pain   • Scoliosis (and kyphoscoliosis), idiopathic   • Abscess   • Perirectal abscess   • Carotid artery stenosis   • Benign essential hypertension   • Interstitial pneumonitis (CMS/HCC)   • Environmental allergies   • Advanced age   • Mild intermittent asthma without complication         Current Outpatient Medications:   •  albuterol sulfate  (90 Base) MCG/ACT inhaler, Inhale 2 puffs Every 4 (Four) Hours As Needed for Wheezing or Shortness of Air., Disp: 3 inhaler, Rfl: 4  •  aspirin 81 MG EC tablet, 1 tablet, delayed release (DR/EC) at bedtime Oral, Disp: , Rfl:   •  atorvastatin (LIPITOR) 10 MG tablet, TAKE 1 TABLET DAILY, Disp: 90 tablet, Rfl: 3  •  Biotin 94020 MCG tablet dispersible, Take  by mouth., Disp: , Rfl:   •  Calcium Citrate-Vitamin D (CITRACAL + D PO), 1 tablet, chewable every other day Oral, Disp: , Rfl:   •  celecoxib (CeleBREX) 200 MG capsule, TAKE 1 CAPSULE DAILY, Disp: 90 capsule, Rfl: 3  •  Cyanocobalamin (B-12) 1000 MCG tablet, Take  by mouth., Disp: , Rfl:   •  Fluticasone Furoate-Vilanterol (BREO ELLIPTA) 200-25 MCG/INH inhaler, Inhale 1 puff Daily., Disp: 3 inhaler, Rfl: 4  •  Multiple Vitamins-Minerals (CENTRUM SILVER PO), 1 tablet every other day Oral, Disp: , Rfl:   •  omeprazole  (priLOSEC) 40 MG capsule, Take 1 capsule by mouth Daily., Disp: 90 capsule, Rfl: 2  •  polyethylene glycol (MIRALAX) packet, Take 17 g by mouth Daily., Disp: , Rfl:   •  senna (SENOKOT) 8.6 MG tablet tablet, 2 tablet every day with supper Oral PRN, Disp: , Rfl:   •  traMADol (ULTRAM) 50 MG tablet, Take 1 tablet by mouth Every 6 (Six) Hours As Needed for Moderate Pain ., Disp: 60 tablet, Rfl: 0    No Known Allergies    Past Medical History:   Diagnosis Date   • Abscess of buttock    • Anorectal abscess    • Benign essential hypertension    • Benign hypertension    • Carotid artery stenosis    • Cellulitis, abdominal wall    • Closed fracture of femur (CMS/HCC)    • COPD (chronic obstructive pulmonary disease) (CMS/HCC)    • Hip pain    • Hyperglycemia    • Hyperlipidemia    • Influenza vaccine needed    • Osteoarthritis    • Primary generalized (osteo)arthritis    • S/P breast biopsy, right      Past Surgical History:   Procedure Laterality Date   • INJECTION OF MEDICATION  07/12/2016    B12 (3)   • INJECTION OF MEDICATION  05/04/2016    Depo Medrol (Methylprednisone) (1)   • INJECTION OF MEDICATION  10/27/2015    Depo Medrol (Methylprednisone) 80mg (8)   • INJECTION OF MEDICATION  10/03/2012    Rocephin (1)   • INJECTION OF MEDICATION  06/26/2015    Toradol (6)   • INTRAMEDULLARY NAILING/RODDING HUMERUS, TIBIA, FEMUR  03/14/2014    Orthotic procedure     Social History     Socioeconomic History   • Marital status:      Spouse name: Neha   • Number of children: 3   • Years of education: Not on file   • Highest education level: Not on file   Tobacco Use   • Smoking status: Never Smoker   • Smokeless tobacco: Never Used   Substance and Sexual Activity   • Alcohol use: No   • Drug use: No   • Sexual activity: Not Currently     Partners: Male     Family History   Problem Relation Age of Onset   • Ovarian cancer Mother         went to colon   • Lung cancer Father           Objective     Blood pressure 130/66,  "pulse 85, height 162.6 cm (64\"), weight 45.4 kg (100 lb), SpO2 94 %, not currently breastfeeding.  Physical Exam   Constitutional: She is oriented to person, place, and time. Vital signs are normal. She appears well-developed and well-nourished.   HENT:   Head: Normocephalic and atraumatic.   Nose: Nose normal. No mucosal edema or rhinorrhea.   Masked   Eyes: Pupils are equal, round, and reactive to light. Conjunctivae, EOM and lids are normal.   Eyeglasses   Neck: Trachea normal and normal range of motion. No tracheal tenderness present. No thyroid mass present.   Cardiovascular: Normal rate, regular rhythm and normal heart sounds. PMI is not displaced. Exam reveals no gallop.   No murmur heard.  Pulmonary/Chest: Effort normal and breath sounds normal. No respiratory distress. She has no decreased breath sounds. She has no wheezes. She has no rhonchi. She exhibits no tenderness.   Mild cough with deep breathing   Abdominal: Soft. Normal appearance and bowel sounds are normal. There is no hepatomegaly. There is no tenderness.   Musculoskeletal:   In wheelchair, no extremity edema     Vascular Status -  Her right foot exhibits no edema. Her left foot exhibits no edema.  Lymphadenopathy:        Head (right side): No submandibular adenopathy present.        Head (left side): No submandibular adenopathy present.     She has no cervical adenopathy.        Right: No supraclavicular adenopathy present.        Left: No supraclavicular adenopathy present.   Neurological: She is alert and oriented to person, place, and time.   Skin: Skin is warm and dry. No rash noted. No cyanosis. Nails show no clubbing.   Psychiatric: She has a normal mood and affect. Her speech is normal and behavior is normal. Judgment normal.   Nursing note and vitals reviewed.      PFTs: 5/2/19 (independently reviewed and interpreted by me)  Ratio 82  FVC 2.28/ 101%  FEV1 1.86/ 113%  TLC 3.82/ 75%  DLCO 45.41/ 187%  Normal spirometry.  Mildly reduced " TLC suggesting early restriction.  Supranormal diffusing capacity which may be due to leak during testing.  No comparative data available.    Radiology (independently reviewed and interpreted by me): CT chest 4/8/19 showed peripheral and basilar predominant fibrotic changes with some areas of honeycombing at the bases consistent with UIP    CXR 11/14/19: Stable bilateral chronic interstitial changes consistent with known pulmonary fibrosis     Assessment/Plan     Monserrat was seen today for uip.    Diagnoses and all orders for this visit:    UIP (usual interstitial pneumonitis) (CMS/[facility])    Mild intermittent asthma without complication    Chronic cough    Advanced age    Environmental allergies         Discussion/ Recommendations:   While I do not have images from her last chest x-ray, it appears that the distribution of the infiltrates was an area that she has known chronic infiltrates.  I think given her symptoms that a course of antibiotics was warranted.  Her symptoms have improved and she is nearing baseline.  I did  that if she has recurrent issues with pneumonia, we would need to reconsider her ICS, but I think it is worth continuing for now given that she has perceived benefit.  I also have recommended that we hold on any symptomatic management of her cough unless it becomes more of an issue, especially preventing her sleep.  I did  her that fatigue from pneumonia can last up to 12 weeks and she is still in this timeframe.    -Continue Breo 200 daily.    -Use albuterol as needed for dyspnea, wheeze, chest tightness, or cough.  -No direct therapy at pulmonary fibrosis.  -If cough becomes more persistent and is impacting sleep, I would recommend symptomatic treatment with a cough suppressant.  -Encouraged regular, progressive exercise as tolerated.  -Up-to-date with flu and pneumococcal vaccines.      Patient's Body mass index is 17.16 kg/m². BMI is below normal parameters. Recommendations include:  treating the underlying disease process.           Return in about 3 months (around 10/8/2020) for Recheck UIP, chronic bronchitis.      Thank you for allowing me to participate in the care of Monserrat Reis Yareli. Please do not hesitate to contact me with any questions.         This document has been electronically signed by Casi Sparks MD on July 8, 2020 11:38      Dictated using Dragon

## 2020-07-08 ENCOUNTER — OFFICE VISIT (OUTPATIENT)
Dept: PULMONOLOGY | Facility: CLINIC | Age: 85
End: 2020-07-08

## 2020-07-08 VITALS
HEART RATE: 85 BPM | OXYGEN SATURATION: 94 % | SYSTOLIC BLOOD PRESSURE: 130 MMHG | BODY MASS INDEX: 17.07 KG/M2 | DIASTOLIC BLOOD PRESSURE: 66 MMHG | HEIGHT: 64 IN | WEIGHT: 100 LBS

## 2020-07-08 DIAGNOSIS — Z91.09 ENVIRONMENTAL ALLERGIES: ICD-10-CM

## 2020-07-08 DIAGNOSIS — R54 ADVANCED AGE: ICD-10-CM

## 2020-07-08 DIAGNOSIS — J84.112 UIP (USUAL INTERSTITIAL PNEUMONITIS) (HCC): Primary | ICD-10-CM

## 2020-07-08 DIAGNOSIS — R05.3 CHRONIC COUGH: ICD-10-CM

## 2020-07-08 DIAGNOSIS — J45.20 MILD INTERMITTENT ASTHMA WITHOUT COMPLICATION: ICD-10-CM

## 2020-07-08 PROCEDURE — 99214 OFFICE O/P EST MOD 30 MIN: CPT | Performed by: INTERNAL MEDICINE

## 2020-07-09 ENCOUNTER — OFFICE VISIT (OUTPATIENT)
Dept: FAMILY MEDICINE CLINIC | Facility: CLINIC | Age: 85
End: 2020-07-09

## 2020-07-09 DIAGNOSIS — R53.1 WEAKNESS: Primary | ICD-10-CM

## 2020-07-09 DIAGNOSIS — J18.9 PNEUMONIA DUE TO INFECTIOUS ORGANISM, UNSPECIFIED LATERALITY, UNSPECIFIED PART OF LUNG: ICD-10-CM

## 2020-07-09 DIAGNOSIS — R32 URINARY INCONTINENCE, UNSPECIFIED TYPE: ICD-10-CM

## 2020-07-09 DIAGNOSIS — N89.8 VAGINAL IRRITATION: ICD-10-CM

## 2020-07-09 DIAGNOSIS — R63.4 WEIGHT LOSS: ICD-10-CM

## 2020-07-09 DIAGNOSIS — I10 BENIGN ESSENTIAL HYPERTENSION: ICD-10-CM

## 2020-07-09 DIAGNOSIS — R79.9 ABNORMAL FINDING OF BLOOD CHEMISTRY, UNSPECIFIED: ICD-10-CM

## 2020-07-09 DIAGNOSIS — J84.89 INTERSTITIAL PNEUMONITIS (HCC): ICD-10-CM

## 2020-07-09 DIAGNOSIS — E78.01 FAMILIAL HYPERCHOLESTEROLEMIA: ICD-10-CM

## 2020-07-09 DIAGNOSIS — E53.8 B12 DEFICIENCY: ICD-10-CM

## 2020-07-09 PROCEDURE — 99443 PR PHYS/QHP TELEPHONE EVALUATION 21-30 MIN: CPT | Performed by: NURSE PRACTITIONER

## 2020-07-09 RX ORDER — NYSTATIN AND TRIAMCINOLONE ACETONIDE 100000; 1 [USP'U]/G; MG/G
OINTMENT TOPICAL 2 TIMES DAILY
Qty: 30 G | Refills: 0 | Status: SHIPPED | OUTPATIENT
Start: 2020-07-09 | End: 2020-07-16

## 2020-07-09 NOTE — PROGRESS NOTES
Subjective   Monserrat Downs is a 89 y.o. female. Patient here today with complaints of No chief complaint on file.  Patient here today for telephone visit with daughter reporting that a few weeks ago on 6/13/2020 she went to the emergency room with fever weakness and cough, was tested for COVID which was negative and diagnosed with pneumonia.  She was not admitted but able to be discharged on 2 antibiotics.  She states she did follow-up with Dr. Sparks yesterday whom she sees regularly for interstitial pulmonary fibrosis and was informed her weakness was likely still from pneumonia.  She also complains of increased urinary output, nocturia about 3 times per night and she is having some urinary incontinence, this is causing irritation externally and she has used Vaseline and Desitin without any symptom relief.  She is also reporting a weight of 100 pounds now which is a weight loss of approximately 4 pounds since last visit here in February.  Has been noticing that slowly her appetite is increasing again since being diagnosed with pneumonia.    There were no vitals filed for this visit.  There is no height or weight on file to calculate BMI.  Past Medical History:   Diagnosis Date   • Abscess of buttock    • Anorectal abscess    • Benign essential hypertension    • Benign hypertension    • Carotid artery stenosis    • Cellulitis, abdominal wall    • Closed fracture of femur (CMS/HCC)    • COPD (chronic obstructive pulmonary disease) (CMS/HCC)    • Hip pain    • Hyperglycemia    • Hyperlipidemia    • Influenza vaccine needed    • Osteoarthritis    • Primary generalized (osteo)arthritis    • S/P breast biopsy, right      Pneumonia   She complains of cough (improved ). The current episode started 1 to 4 weeks ago. The problem occurs intermittently. The problem has been gradually improving. Associated symptoms include appetite change, malaise/fatigue and weight loss. Pertinent negatives include no fever. Her symptoms are  aggravated by any activity. Her symptoms are alleviated by rest, beta-agonist and steroid inhaler. She reports moderate improvement on treatment. There are no known risk factors for lung disease. Her past medical history is significant for pneumonia.   Urinary Frequency    This is a new problem. The current episode started 1 to 4 weeks ago. The problem has been unchanged. The patient is experiencing no pain. There has been no fever. Associated symptoms include frequency and urgency. She has tried nothing for the symptoms. The treatment provided no relief.   Diaper Rash   This is a new problem. The current episode started in the past 7 days. The problem occurs constantly. The problem has been unchanged. Associated symptoms include coughing (improved ). Pertinent negatives include no fever. Exacerbated by: urinary incont. Treatments tried: desitin, vaseline  The treatment provided no relief.        The following portions of the patient's history were reviewed and updated as appropriate: allergies, current medications, past family history, past medical history, past social history, past surgical history and problem list.    Review of Systems   Constitutional: Positive for appetite change, malaise/fatigue and weight loss. Negative for fever.   HENT: Negative.    Eyes: Negative.    Respiratory: Positive for cough (improved ).    Cardiovascular: Negative.    Gastrointestinal: Negative.    Endocrine: Negative.    Genitourinary: Positive for frequency and urgency.   Musculoskeletal: Negative.    Skin: Negative.    Allergic/Immunologic: Negative.    Neurological: Negative.    Hematological: Negative.    Psychiatric/Behavioral: Negative.        Objective   Physical Exam  Deferred, telephone visit     Assessment/Plan   Diagnoses and all orders for this visit:    Weakness  -     CBC & Differential; Future  -     Comprehensive metabolic panel; Future  -     TSH; Future  -     Ferritin; Future  -     Folate; Future  -     Iron  and TIBC; Future  -     Vitamin B12; Future  -     XR Chest 2 View    Weight loss  -     CBC & Differential; Future  -     Comprehensive metabolic panel; Future  -     TSH; Future  -     Ferritin; Future  -     Folate; Future  -     Iron and TIBC; Future  -     Vitamin B12; Future  -     XR Chest 2 View    Pneumonia due to infectious organism, unspecified laterality, unspecified part of lung  -     CBC & Differential; Future  -     Comprehensive metabolic panel; Future  -     TSH; Future  -     Ferritin; Future  -     Folate; Future  -     Iron and TIBC; Future  -     Vitamin B12; Future  -     XR Chest 2 View    Abnormal finding of blood chemistry, unspecified   -     Ferritin; Future  -     Iron and TIBC; Future    Familial hypercholesterolemia    Benign essential hypertension    Interstitial pneumonitis (CMS/HCC)    B12 deficiency    Vaginal irritation    Urinary incontinence, unspecified type    Other orders  -     nystatin-triamcinolone (MYCOLOG) 081360-5.1 UNIT/GM-% ointment; Apply  topically to the appropriate area as directed 2 (Two) Times a Day for 7 days.    She is given Mycolog ointment as above and if symptoms should persist or worsen she will return to clinic for follow-up.  I will obtain labs and chest x-ray as above and she will be encouraged to have them in the next couple of weeks for recheck.  Follow-up with Dr. Sparks, pulmonology, as scheduled.  Advised to start drinking boost shakes and monitor weight closely, if continues to lose weight she needs to call back for recheck sooner than scheduled.  Otherwise I will see her back for recheck in 3 months.  They are aware and in agreement to this plan.  All questions and concerns are addressed with understanding verbalized. You have chosen to receive care through a telephone visit. Do you consent to use a telephone visit for your medical care today? Yes This visit has been rescheduled as a phone visit to comply with patient safety concerns in  accordance with CDC recommendations. Total time of discussion was 17 minutes.

## 2020-07-15 ENCOUNTER — LAB (OUTPATIENT)
Dept: LAB | Facility: OTHER | Age: 85
End: 2020-07-15

## 2020-07-15 DIAGNOSIS — R53.1 WEAKNESS: ICD-10-CM

## 2020-07-15 DIAGNOSIS — R63.4 WEIGHT LOSS: ICD-10-CM

## 2020-07-15 DIAGNOSIS — J18.9 PNEUMONIA DUE TO INFECTIOUS ORGANISM, UNSPECIFIED LATERALITY, UNSPECIFIED PART OF LUNG: ICD-10-CM

## 2020-07-15 DIAGNOSIS — R79.9 ABNORMAL FINDING OF BLOOD CHEMISTRY, UNSPECIFIED: ICD-10-CM

## 2020-07-15 LAB
ALBUMIN SERPL-MCNC: 4 G/DL (ref 3.5–5)
ALBUMIN/GLOB SERPL: 1.1 G/DL (ref 1.1–1.8)
ALP SERPL-CCNC: 67 U/L (ref 38–126)
ALT SERPL W P-5'-P-CCNC: 10 U/L
ANION GAP SERPL CALCULATED.3IONS-SCNC: 6 MMOL/L (ref 5–15)
AST SERPL-CCNC: 22 U/L (ref 14–36)
BASOPHILS # BLD AUTO: 0.04 10*3/MM3 (ref 0–0.2)
BASOPHILS NFR BLD AUTO: 0.4 % (ref 0–1.5)
BILIRUB SERPL-MCNC: 0.3 MG/DL (ref 0.2–1.3)
BUN SERPL-MCNC: 27 MG/DL (ref 7–23)
BUN/CREAT SERPL: 31 (ref 7–25)
CALCIUM SPEC-SCNC: 10 MG/DL (ref 8.4–10.2)
CHLORIDE SERPL-SCNC: 100 MMOL/L (ref 101–112)
CO2 SERPL-SCNC: 29 MMOL/L (ref 22–30)
CREAT SERPL-MCNC: 0.87 MG/DL (ref 0.52–1.04)
DEPRECATED RDW RBC AUTO: 49.2 FL (ref 37–54)
EOSINOPHIL # BLD AUTO: 0.22 10*3/MM3 (ref 0–0.4)
EOSINOPHIL NFR BLD AUTO: 2 % (ref 0.3–6.2)
ERYTHROCYTE [DISTWIDTH] IN BLOOD BY AUTOMATED COUNT: 14.6 % (ref 12.3–15.4)
GFR SERPL CREATININE-BSD FRML MDRD: 61 ML/MIN/1.73 (ref 39–90)
GLOBULIN UR ELPH-MCNC: 3.8 GM/DL (ref 2.3–3.5)
GLUCOSE SERPL-MCNC: 105 MG/DL (ref 70–99)
HCT VFR BLD AUTO: 33.1 % (ref 34–46.6)
HGB BLD-MCNC: 10.8 G/DL (ref 12–15.9)
LYMPHOCYTES # BLD AUTO: 2.96 10*3/MM3 (ref 0.7–3.1)
LYMPHOCYTES NFR BLD AUTO: 26.6 % (ref 19.6–45.3)
MCH RBC QN AUTO: 31.6 PG (ref 26.6–33)
MCHC RBC AUTO-ENTMCNC: 32.6 G/DL (ref 31.5–35.7)
MCV RBC AUTO: 96.8 FL (ref 79–97)
MONOCYTES # BLD AUTO: 1.12 10*3/MM3 (ref 0.1–0.9)
MONOCYTES NFR BLD AUTO: 10.1 % (ref 5–12)
NEUTROPHILS NFR BLD AUTO: 6.8 10*3/MM3 (ref 1.7–7)
NEUTROPHILS NFR BLD AUTO: 60.9 % (ref 42.7–76)
PLATELET # BLD AUTO: 451 10*3/MM3 (ref 140–450)
PMV BLD AUTO: 8.5 FL (ref 6–12)
POTASSIUM SERPL-SCNC: 4.8 MMOL/L (ref 3.4–5)
PROT SERPL-MCNC: 7.8 G/DL (ref 6.3–8.6)
RBC # BLD AUTO: 3.42 10*6/MM3 (ref 3.77–5.28)
SODIUM SERPL-SCNC: 135 MMOL/L (ref 137–145)
WBC # BLD AUTO: 11.14 10*3/MM3 (ref 3.4–10.8)

## 2020-07-15 PROCEDURE — 84466 ASSAY OF TRANSFERRIN: CPT | Performed by: NURSE PRACTITIONER

## 2020-07-15 PROCEDURE — 82746 ASSAY OF FOLIC ACID SERUM: CPT | Performed by: NURSE PRACTITIONER

## 2020-07-15 PROCEDURE — 84443 ASSAY THYROID STIM HORMONE: CPT | Performed by: NURSE PRACTITIONER

## 2020-07-15 PROCEDURE — 83540 ASSAY OF IRON: CPT | Performed by: NURSE PRACTITIONER

## 2020-07-15 PROCEDURE — 80053 COMPREHEN METABOLIC PANEL: CPT | Performed by: NURSE PRACTITIONER

## 2020-07-15 PROCEDURE — 85025 COMPLETE CBC W/AUTO DIFF WBC: CPT | Performed by: NURSE PRACTITIONER

## 2020-07-15 PROCEDURE — 82607 VITAMIN B-12: CPT | Performed by: NURSE PRACTITIONER

## 2020-07-15 PROCEDURE — 82728 ASSAY OF FERRITIN: CPT | Performed by: NURSE PRACTITIONER

## 2020-07-15 PROCEDURE — 36415 COLL VENOUS BLD VENIPUNCTURE: CPT | Performed by: NURSE PRACTITIONER

## 2020-07-16 LAB
FERRITIN SERPL-MCNC: 47.8 NG/ML (ref 13–150)
FOLATE SERPL-MCNC: >20 NG/ML (ref 4.78–24.2)
IRON 24H UR-MRATE: 59 MCG/DL (ref 37–145)
IRON SATN MFR SERPL: 16 % (ref 20–50)
TIBC SERPL-MCNC: 371 MCG/DL (ref 298–536)
TRANSFERRIN SERPL-MCNC: 249 MG/DL (ref 200–360)
TSH SERPL DL<=0.05 MIU/L-ACNC: 4.36 UIU/ML (ref 0.27–4.2)
VIT B12 BLD-MCNC: 607 PG/ML (ref 211–946)

## 2020-07-21 DIAGNOSIS — D72.829 LEUKOCYTOSIS, UNSPECIFIED TYPE: Primary | ICD-10-CM

## 2020-08-14 ENCOUNTER — TELEPHONE (OUTPATIENT)
Dept: PULMONOLOGY | Facility: CLINIC | Age: 85
End: 2020-08-14

## 2020-08-14 RX ORDER — BENZONATATE 200 MG/1
200 CAPSULE ORAL 3 TIMES DAILY PRN
Qty: 45 CAPSULE | Refills: 1 | Status: SHIPPED | OUTPATIENT
Start: 2020-08-14 | End: 2020-10-22

## 2020-08-14 NOTE — TELEPHONE ENCOUNTER
Per Dr. Sparks, a prescription for Tessalon Pearls was sent to [facility].  Patients daughter was notified of this.          ----- Message from Bryon Jara sent at 8/14/2020 12:40 PM CDT -----  pts daughter, Danielle called said her moms coughing is getting worse, actually coughing with almost every breath, she is very weak.  She wondered if there was a med Dr. Sparks could add or change.  Please call the dtr at [phone].     Thanks    Bryon

## 2020-08-19 ENCOUNTER — LAB (OUTPATIENT)
Dept: LAB | Facility: OTHER | Age: 85
End: 2020-08-19

## 2020-08-19 LAB
BASOPHILS # BLD AUTO: 0.03 10*3/MM3 (ref 0–0.2)
BASOPHILS NFR BLD AUTO: 0.2 % (ref 0–1.5)
DEPRECATED RDW RBC AUTO: 48.6 FL (ref 37–54)
EOSINOPHIL # BLD AUTO: 0.27 10*3/MM3 (ref 0–0.4)
EOSINOPHIL NFR BLD AUTO: 2.2 % (ref 0.3–6.2)
ERYTHROCYTE [DISTWIDTH] IN BLOOD BY AUTOMATED COUNT: 14.4 % (ref 12.3–15.4)
HCT VFR BLD AUTO: 35.9 % (ref 34–46.6)
HGB BLD-MCNC: 11.4 G/DL (ref 12–15.9)
LYMPHOCYTES # BLD AUTO: 3.43 10*3/MM3 (ref 0.7–3.1)
LYMPHOCYTES NFR BLD AUTO: 28.5 % (ref 19.6–45.3)
MCH RBC QN AUTO: 30.6 PG (ref 26.6–33)
MCHC RBC AUTO-ENTMCNC: 31.8 G/DL (ref 31.5–35.7)
MCV RBC AUTO: 96.5 FL (ref 79–97)
MONOCYTES # BLD AUTO: 0.97 10*3/MM3 (ref 0.1–0.9)
MONOCYTES NFR BLD AUTO: 8 % (ref 5–12)
NEUTROPHILS NFR BLD AUTO: 61.1 % (ref 42.7–76)
NEUTROPHILS NFR BLD AUTO: 7.35 10*3/MM3 (ref 1.7–7)
PLATELET # BLD AUTO: 498 10*3/MM3 (ref 140–450)
PMV BLD AUTO: 8.7 FL (ref 6–12)
RBC # BLD AUTO: 3.72 10*6/MM3 (ref 3.77–5.28)
WBC # BLD AUTO: 12.05 10*3/MM3 (ref 3.4–10.8)

## 2020-08-19 PROCEDURE — 36415 COLL VENOUS BLD VENIPUNCTURE: CPT | Performed by: NURSE PRACTITIONER

## 2020-08-19 PROCEDURE — 85025 COMPLETE CBC W/AUTO DIFF WBC: CPT | Performed by: NURSE PRACTITIONER

## 2020-08-24 DIAGNOSIS — M25.50 ARTHRALGIA, UNSPECIFIED JOINT: Primary | ICD-10-CM

## 2020-08-24 DIAGNOSIS — R79.9 ABNORMAL BLOOD FINDING: ICD-10-CM

## 2020-09-21 ENCOUNTER — LAB (OUTPATIENT)
Dept: LAB | Facility: OTHER | Age: 85
End: 2020-09-21

## 2020-09-21 DIAGNOSIS — M25.50 ARTHRALGIA, UNSPECIFIED JOINT: ICD-10-CM

## 2020-09-21 DIAGNOSIS — R79.9 ABNORMAL BLOOD FINDING: ICD-10-CM

## 2020-09-21 LAB
BASOPHILS # BLD AUTO: 0.04 10*3/MM3 (ref 0–0.2)
BASOPHILS NFR BLD AUTO: 0.3 % (ref 0–1.5)
DEPRECATED RDW RBC AUTO: 49.3 FL (ref 37–54)
EOSINOPHIL # BLD AUTO: 0.27 10*3/MM3 (ref 0–0.4)
EOSINOPHIL NFR BLD AUTO: 2.1 % (ref 0.3–6.2)
ERYTHROCYTE [DISTWIDTH] IN BLOOD BY AUTOMATED COUNT: 14.6 % (ref 12.3–15.4)
ERYTHROCYTE [SEDIMENTATION RATE] IN BLOOD: 48 MM/HR (ref 0–20)
HCT VFR BLD AUTO: 34.7 % (ref 34–46.6)
HGB BLD-MCNC: 11 G/DL (ref 12–15.9)
LYMPHOCYTES # BLD AUTO: 3.03 10*3/MM3 (ref 0.7–3.1)
LYMPHOCYTES NFR BLD AUTO: 23.9 % (ref 19.6–45.3)
MCH RBC QN AUTO: 30.5 PG (ref 26.6–33)
MCHC RBC AUTO-ENTMCNC: 31.7 G/DL (ref 31.5–35.7)
MCV RBC AUTO: 96.1 FL (ref 79–97)
MONOCYTES # BLD AUTO: 1.23 10*3/MM3 (ref 0.1–0.9)
MONOCYTES NFR BLD AUTO: 9.7 % (ref 5–12)
NEUTROPHILS NFR BLD AUTO: 64 % (ref 42.7–76)
NEUTROPHILS NFR BLD AUTO: 8.09 10*3/MM3 (ref 1.7–7)
PLATELET # BLD AUTO: 427 10*3/MM3 (ref 140–450)
PMV BLD AUTO: 9 FL (ref 6–12)
RBC # BLD AUTO: 3.61 10*6/MM3 (ref 3.77–5.28)
WBC # BLD AUTO: 12.66 10*3/MM3 (ref 3.4–10.8)

## 2020-09-21 PROCEDURE — 36415 COLL VENOUS BLD VENIPUNCTURE: CPT | Performed by: NURSE PRACTITIONER

## 2020-09-21 PROCEDURE — 86140 C-REACTIVE PROTEIN: CPT | Performed by: NURSE PRACTITIONER

## 2020-09-21 PROCEDURE — 85025 COMPLETE CBC W/AUTO DIFF WBC: CPT | Performed by: NURSE PRACTITIONER

## 2020-09-21 PROCEDURE — 85651 RBC SED RATE NONAUTOMATED: CPT | Performed by: NURSE PRACTITIONER

## 2020-09-22 LAB — CRP SERPL-MCNC: 0.75 MG/DL (ref 0–0.5)

## 2020-09-24 DIAGNOSIS — D72.829 LEUKOCYTOSIS, UNSPECIFIED TYPE: Primary | ICD-10-CM

## 2020-09-24 PROBLEM — M25.50 ARTHRALGIA: Status: ACTIVE | Noted: 2020-09-24

## 2020-09-24 PROBLEM — R79.9 ABNORMAL BLOOD FINDING: Status: ACTIVE | Noted: 2020-09-24

## 2020-09-24 RX ORDER — PREDNISONE 20 MG/1
20 TABLET ORAL 2 TIMES DAILY
Qty: 10 TABLET | Refills: 0 | Status: SHIPPED | OUTPATIENT
Start: 2020-09-24 | End: 2020-10-01

## 2020-10-01 ENCOUNTER — OFFICE VISIT (OUTPATIENT)
Dept: FAMILY MEDICINE CLINIC | Facility: CLINIC | Age: 85
End: 2020-10-01

## 2020-10-01 DIAGNOSIS — R05.9 COUGH: Primary | ICD-10-CM

## 2020-10-01 DIAGNOSIS — R06.02 SHORTNESS OF BREATH: ICD-10-CM

## 2020-10-01 DIAGNOSIS — J84.89 INTERSTITIAL PNEUMONITIS (HCC): ICD-10-CM

## 2020-10-01 DIAGNOSIS — R53.1 WEAKNESS: ICD-10-CM

## 2020-10-01 PROCEDURE — 99443 PR PHYS/QHP TELEPHONE EVALUATION 21-30 MIN: CPT | Performed by: NURSE PRACTITIONER

## 2020-10-01 RX ORDER — DOXYCYCLINE HYCLATE 100 MG/1
100 CAPSULE ORAL 2 TIMES DAILY
Qty: 14 CAPSULE | Refills: 0 | Status: SHIPPED | OUTPATIENT
Start: 2020-10-01 | End: 2020-10-08

## 2020-10-01 NOTE — PROGRESS NOTES
"Subjective   Monserrat Downs is a 89 y.o. female. Patient here today with complaints of Med Refill  Patient here today for telehealth visit with daughter complaining of weakness, feeling \"sore in my lungs \".  Feeling this way for some time but worse now and worse with deep breath.  Prednisone did help some.  She complains of weakness did see Dr. Sparks at her last appointment she did discuss changing her inhalers but has had to reschedule the last 2 appointments and now her appointment is on 10/28/2020.  She reports that Dr. Sparks did give her cough medicine.  Patient denies shortness of breath, states her dry cough has increased.  She eats about 3 meals per day weight about 109 at home last week, reporting weight loss.  Denies swelling in her legs or feet.  She has pulmonary fibrosis.    There were no vitals filed for this visit.  There is no height or weight on file to calculate BMI.  Past Medical History:   Diagnosis Date   • Abscess of buttock    • Anorectal abscess    • Benign essential hypertension    • Benign hypertension    • Carotid artery stenosis    • Cellulitis, abdominal wall    • Closed fracture of femur (CMS/[facility])    • COPD (chronic obstructive pulmonary disease) (CMS/[facility])    • Hip pain    • Hyperglycemia    • Hyperlipidemia    • Influenza vaccine needed    • Osteoarthritis    • Primary generalized (osteo)arthritis    • S/P breast biopsy, right      History of Present Illness     The following portions of the patient's history were reviewed and updated as appropriate: allergies, current medications, past family history, past medical history, past social history, past surgical history and problem list.    Review of Systems   Constitutional: Positive for appetite change and unexpected weight change.   HENT: Negative.    Eyes: Negative.    Respiratory: Positive for cough. Negative for shortness of breath.    Cardiovascular: Negative.  Negative for leg swelling.   Gastrointestinal: Negative.    Endocrine: " Negative.    Genitourinary: Negative.    Musculoskeletal: Negative.    Skin: Negative.    Allergic/Immunologic: Negative.    Neurological: Positive for weakness.   Hematological: Negative.    Psychiatric/Behavioral: Negative.        Objective   Physical Exam  Deferred, telehealth visit  Assessment/Plan   Diagnoses and all orders for this visit:    1. Cough (Primary)  -     XR Chest 2 View  -     BNP; Future  -     CBC & Differential; Future  -     Comprehensive metabolic panel; Future    2. Weakness  -     XR Chest 2 View  -     BNP; Future  -     CBC & Differential; Future  -     Comprehensive metabolic panel; Future    3. Shortness of breath  -     XR Chest 2 View  -     BNP; Future  -     CBC & Differential; Future  -     Comprehensive metabolic panel; Future    4. Interstitial pneumonitis (CMS/HCC)  -     XR Chest 2 View  -     BNP; Future  -     CBC & Differential; Future  -     Comprehensive metabolic panel; Future    Other orders  -     doxycycline (VIBRAMYCIN) 100 MG capsule; Take 1 capsule by mouth 2 (Two) Times a Day for 7 days.  Dispense: 14 capsule; Refill: 0    She is prescribed doxycycline and I will obtain labs as above as well as chest x-ray.  She will be informed of results, if symptoms persist or worsen she will return to clinic for recheck, follow-up with pulmonology as scheduled.  Monitor weight and calorie intake closely.  All questions and concerns are addressed with understanding verbalized.  She is aware and is agreeable to this plan.You have chosen to receive care through a telephone visit. Do you consent to use a telephone visit for your medical care today? Yes This visit has been rescheduled as a phone visit to comply with patient safety concerns in accordance with CDC recommendations. Total time of discussion was 25 minutes.

## 2020-10-13 ENCOUNTER — LAB (OUTPATIENT)
Dept: LAB | Facility: OTHER | Age: 85
End: 2020-10-13

## 2020-10-13 DIAGNOSIS — R53.1 WEAKNESS: ICD-10-CM

## 2020-10-13 DIAGNOSIS — J84.89 INTERSTITIAL PNEUMONITIS (HCC): ICD-10-CM

## 2020-10-13 DIAGNOSIS — R06.02 SHORTNESS OF BREATH: ICD-10-CM

## 2020-10-13 DIAGNOSIS — R05.9 COUGH: ICD-10-CM

## 2020-10-13 LAB
ALBUMIN SERPL-MCNC: 3.7 G/DL (ref 3.5–5)
ALBUMIN/GLOB SERPL: 0.9 G/DL (ref 1.1–1.8)
ALP SERPL-CCNC: 90 U/L (ref 38–126)
ALT SERPL W P-5'-P-CCNC: 10 U/L
ANION GAP SERPL CALCULATED.3IONS-SCNC: 6 MMOL/L (ref 5–15)
AST SERPL-CCNC: 22 U/L (ref 14–36)
BASOPHILS # BLD AUTO: 0.05 10*3/MM3 (ref 0–0.2)
BASOPHILS NFR BLD AUTO: 0.5 % (ref 0–1.5)
BILIRUB SERPL-MCNC: 0.4 MG/DL (ref 0.2–1.3)
BNP SERPL-MCNC: 207 PG/ML (ref 0–100)
BUN SERPL-MCNC: 14 MG/DL (ref 7–23)
BUN/CREAT SERPL: 20.3 (ref 7–25)
CALCIUM SPEC-SCNC: 9.3 MG/DL (ref 8.4–10.2)
CHLORIDE SERPL-SCNC: 107 MMOL/L (ref 101–112)
CO2 SERPL-SCNC: 29 MMOL/L (ref 22–30)
CREAT SERPL-MCNC: 0.69 MG/DL (ref 0.52–1.04)
DEPRECATED RDW RBC AUTO: 49.7 FL (ref 37–54)
EOSINOPHIL # BLD AUTO: 0.31 10*3/MM3 (ref 0–0.4)
EOSINOPHIL NFR BLD AUTO: 2.8 % (ref 0.3–6.2)
ERYTHROCYTE [DISTWIDTH] IN BLOOD BY AUTOMATED COUNT: 14.8 % (ref 12.3–15.4)
GFR SERPL CREATININE-BSD FRML MDRD: 80 ML/MIN/1.73 (ref 39–90)
GLOBULIN UR ELPH-MCNC: 4.3 GM/DL (ref 2.3–3.5)
GLUCOSE SERPL-MCNC: 105 MG/DL (ref 70–99)
HCT VFR BLD AUTO: 35.2 % (ref 34–46.6)
HGB BLD-MCNC: 11.2 G/DL (ref 12–15.9)
LYMPHOCYTES # BLD AUTO: 3.64 10*3/MM3 (ref 0.7–3.1)
LYMPHOCYTES NFR BLD AUTO: 33.2 % (ref 19.6–45.3)
MCH RBC QN AUTO: 30.5 PG (ref 26.6–33)
MCHC RBC AUTO-ENTMCNC: 31.8 G/DL (ref 31.5–35.7)
MCV RBC AUTO: 95.9 FL (ref 79–97)
MONOCYTES # BLD AUTO: 0.95 10*3/MM3 (ref 0.1–0.9)
MONOCYTES NFR BLD AUTO: 8.7 % (ref 5–12)
NEUTROPHILS NFR BLD AUTO: 54.8 % (ref 42.7–76)
NEUTROPHILS NFR BLD AUTO: 6.03 10*3/MM3 (ref 1.7–7)
PLATELET # BLD AUTO: 449 10*3/MM3 (ref 140–450)
PMV BLD AUTO: 8.6 FL (ref 6–12)
POTASSIUM SERPL-SCNC: 5.1 MMOL/L (ref 3.4–5)
PROT SERPL-MCNC: 8 G/DL (ref 6.3–8.6)
RBC # BLD AUTO: 3.67 10*6/MM3 (ref 3.77–5.28)
SODIUM SERPL-SCNC: 142 MMOL/L (ref 137–145)
WBC # BLD AUTO: 10.98 10*3/MM3 (ref 3.4–10.8)

## 2020-10-13 PROCEDURE — 36415 COLL VENOUS BLD VENIPUNCTURE: CPT | Performed by: NURSE PRACTITIONER

## 2020-10-13 PROCEDURE — 80053 COMPREHEN METABOLIC PANEL: CPT | Performed by: NURSE PRACTITIONER

## 2020-10-13 PROCEDURE — 85025 COMPLETE CBC W/AUTO DIFF WBC: CPT | Performed by: NURSE PRACTITIONER

## 2020-10-13 PROCEDURE — 83880 ASSAY OF NATRIURETIC PEPTIDE: CPT | Performed by: NURSE PRACTITIONER

## 2020-10-15 ENCOUNTER — APPOINTMENT (OUTPATIENT)
Dept: ONCOLOGY | Facility: HOSPITAL | Age: 85
End: 2020-10-15

## 2020-10-15 ENCOUNTER — APPOINTMENT (OUTPATIENT)
Dept: ONCOLOGY | Facility: CLINIC | Age: 85
End: 2020-10-15

## 2020-10-17 ENCOUNTER — APPOINTMENT (OUTPATIENT)
Dept: CT IMAGING | Facility: HOSPITAL | Age: 85
End: 2020-10-17

## 2020-10-17 ENCOUNTER — HOSPITAL ENCOUNTER (OUTPATIENT)
Facility: HOSPITAL | Age: 85
Setting detail: OBSERVATION
Discharge: HOME HEALTH | End: 2020-10-19
Attending: FAMILY MEDICINE | Admitting: HOSPITALIST
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
## 2020-10-17 DIAGNOSIS — Z74.09 IMPAIRED PHYSICAL MOBILITY: ICD-10-CM

## 2020-10-17 DIAGNOSIS — S22.42XA CLOSED FRACTURE OF MULTIPLE RIBS OF LEFT SIDE, INITIAL ENCOUNTER: ICD-10-CM

## 2020-10-17 DIAGNOSIS — Z74.09 IMPAIRED MOBILITY AND ADLS: ICD-10-CM

## 2020-10-17 DIAGNOSIS — Z78.9 IMPAIRED MOBILITY AND ADLS: ICD-10-CM

## 2020-10-17 LAB
ALBUMIN SERPL-MCNC: 3.4 G/DL (ref 3.5–5.2)
ALBUMIN/GLOB SERPL: 0.9 G/DL
ALP SERPL-CCNC: 65 U/L (ref 39–117)
ALT SERPL W P-5'-P-CCNC: 12 U/L (ref 1–33)
ANION GAP SERPL CALCULATED.3IONS-SCNC: 11 MMOL/L (ref 5–15)
AST SERPL-CCNC: 21 U/L (ref 1–32)
BACTERIA UR QL AUTO: ABNORMAL /HPF
BASOPHILS # BLD AUTO: 0.04 10*3/MM3 (ref 0–0.2)
BASOPHILS NFR BLD AUTO: 0.3 % (ref 0–1.5)
BILIRUB SERPL-MCNC: 0.2 MG/DL (ref 0–1.2)
BILIRUB UR QL STRIP: NEGATIVE
BUN SERPL-MCNC: 15 MG/DL (ref 8–23)
BUN/CREAT SERPL: 18.5 (ref 7–25)
CALCIUM SPEC-SCNC: 9.2 MG/DL (ref 8.2–9.6)
CHLORIDE SERPL-SCNC: 98 MMOL/L (ref 98–107)
CK SERPL-CCNC: 63 U/L (ref 20–180)
CLARITY UR: CLEAR
CO2 SERPL-SCNC: 26 MMOL/L (ref 22–29)
COLOR UR: YELLOW
CREAT SERPL-MCNC: 0.81 MG/DL (ref 0.57–1)
DEPRECATED RDW RBC AUTO: 48.9 FL (ref 37–54)
EOSINOPHIL # BLD AUTO: 0.16 10*3/MM3 (ref 0–0.4)
EOSINOPHIL NFR BLD AUTO: 1.2 % (ref 0.3–6.2)
ERYTHROCYTE [DISTWIDTH] IN BLOOD BY AUTOMATED COUNT: 14.2 % (ref 12.3–15.4)
GFR SERPL CREATININE-BSD FRML MDRD: 66 ML/MIN/1.73
GLOBULIN UR ELPH-MCNC: 3.8 GM/DL
GLUCOSE SERPL-MCNC: 105 MG/DL (ref 65–99)
GLUCOSE UR STRIP-MCNC: NEGATIVE MG/DL
HCT VFR BLD AUTO: 32.4 % (ref 34–46.6)
HGB BLD-MCNC: 10.5 G/DL (ref 12–15.9)
HGB UR QL STRIP.AUTO: NEGATIVE
HOLD SPECIMEN: NORMAL
HYALINE CASTS UR QL AUTO: ABNORMAL /LPF
IMM GRANULOCYTES # BLD AUTO: 0.13 10*3/MM3 (ref 0–0.05)
IMM GRANULOCYTES NFR BLD AUTO: 0.9 % (ref 0–0.5)
KETONES UR QL STRIP: ABNORMAL
LEUKOCYTE ESTERASE UR QL STRIP.AUTO: ABNORMAL
LYMPHOCYTES # BLD AUTO: 2.59 10*3/MM3 (ref 0.7–3.1)
LYMPHOCYTES NFR BLD AUTO: 18.7 % (ref 19.6–45.3)
MCH RBC QN AUTO: 30.6 PG (ref 26.6–33)
MCHC RBC AUTO-ENTMCNC: 32.4 G/DL (ref 31.5–35.7)
MCV RBC AUTO: 94.5 FL (ref 79–97)
MONOCYTES # BLD AUTO: 1.17 10*3/MM3 (ref 0.1–0.9)
MONOCYTES NFR BLD AUTO: 8.4 % (ref 5–12)
NEUTROPHILS NFR BLD AUTO: 70.5 % (ref 42.7–76)
NEUTROPHILS NFR BLD AUTO: 9.76 10*3/MM3 (ref 1.7–7)
NITRITE UR QL STRIP: NEGATIVE
NRBC BLD AUTO-RTO: 0 /100 WBC (ref 0–0.2)
PH UR STRIP.AUTO: 6 [PH] (ref 5–9)
PLATELET # BLD AUTO: 430 10*3/MM3 (ref 140–450)
PMV BLD AUTO: 9 FL (ref 6–12)
POTASSIUM SERPL-SCNC: 3.9 MMOL/L (ref 3.5–5.2)
PROT SERPL-MCNC: 7.2 G/DL (ref 6–8.5)
PROT UR QL STRIP: NEGATIVE
RBC # BLD AUTO: 3.43 10*6/MM3 (ref 3.77–5.28)
RBC # UR: ABNORMAL /HPF
REF LAB TEST METHOD: ABNORMAL
SODIUM SERPL-SCNC: 135 MMOL/L (ref 136–145)
SP GR UR STRIP: 1.03 (ref 1–1.03)
SQUAMOUS #/AREA URNS HPF: ABNORMAL /HPF
UROBILINOGEN UR QL STRIP: ABNORMAL
WBC # BLD AUTO: 13.85 10*3/MM3 (ref 3.4–10.8)
WBC UR QL AUTO: ABNORMAL /HPF
WHOLE BLOOD HOLD SPECIMEN: NORMAL

## 2020-10-17 PROCEDURE — 81001 URINALYSIS AUTO W/SCOPE: CPT | Performed by: FAMILY MEDICINE

## 2020-10-17 PROCEDURE — 96376 TX/PRO/DX INJ SAME DRUG ADON: CPT

## 2020-10-17 PROCEDURE — 63710000001 ONDANSETRON PER 8 MG: Performed by: INTERNAL MEDICINE

## 2020-10-17 PROCEDURE — 25010000002 MORPHINE PER 10 MG: Performed by: INTERNAL MEDICINE

## 2020-10-17 PROCEDURE — 85025 COMPLETE CBC W/AUTO DIFF WBC: CPT | Performed by: FAMILY MEDICINE

## 2020-10-17 PROCEDURE — 25010000002 IOPAMIDOL 61 % SOLUTION: Performed by: FAMILY MEDICINE

## 2020-10-17 PROCEDURE — G0378 HOSPITAL OBSERVATION PER HR: HCPCS

## 2020-10-17 PROCEDURE — 25010000002 MORPHINE PER 10 MG

## 2020-10-17 PROCEDURE — 80053 COMPREHEN METABOLIC PANEL: CPT | Performed by: FAMILY MEDICINE

## 2020-10-17 PROCEDURE — 99284 EMERGENCY DEPT VISIT MOD MDM: CPT

## 2020-10-17 PROCEDURE — 25010000002 MORPHINE PER 10 MG: Performed by: FAMILY MEDICINE

## 2020-10-17 PROCEDURE — 71260 CT THORAX DX C+: CPT

## 2020-10-17 PROCEDURE — 82550 ASSAY OF CK (CPK): CPT | Performed by: FAMILY MEDICINE

## 2020-10-17 PROCEDURE — 96361 HYDRATE IV INFUSION ADD-ON: CPT

## 2020-10-17 PROCEDURE — 96374 THER/PROPH/DIAG INJ IV PUSH: CPT

## 2020-10-17 PROCEDURE — 94760 N-INVAS EAR/PLS OXIMETRY 1: CPT

## 2020-10-17 RX ORDER — SODIUM CHLORIDE 9 MG/ML
75 INJECTION, SOLUTION INTRAVENOUS CONTINUOUS
Status: DISCONTINUED | OUTPATIENT
Start: 2020-10-17 | End: 2020-10-19 | Stop reason: HOSPADM

## 2020-10-17 RX ORDER — PANTOPRAZOLE SODIUM 40 MG/1
40 TABLET, DELAYED RELEASE ORAL EVERY MORNING
Status: DISCONTINUED | OUTPATIENT
Start: 2020-10-18 | End: 2020-10-19 | Stop reason: HOSPADM

## 2020-10-17 RX ORDER — MORPHINE SULFATE 2 MG/ML
2 INJECTION, SOLUTION INTRAMUSCULAR; INTRAVENOUS EVERY 4 HOURS PRN
Status: DISCONTINUED | OUTPATIENT
Start: 2020-10-17 | End: 2020-10-18

## 2020-10-17 RX ORDER — SENNA PLUS 8.6 MG/1
1 TABLET ORAL NIGHTLY PRN
Status: DISCONTINUED | OUTPATIENT
Start: 2020-10-17 | End: 2020-10-19 | Stop reason: HOSPADM

## 2020-10-17 RX ORDER — ONDANSETRON 2 MG/ML
4 INJECTION INTRAMUSCULAR; INTRAVENOUS EVERY 6 HOURS PRN
Status: DISCONTINUED | OUTPATIENT
Start: 2020-10-17 | End: 2020-10-19 | Stop reason: HOSPADM

## 2020-10-17 RX ORDER — ATORVASTATIN CALCIUM 10 MG/1
10 TABLET, FILM COATED ORAL DAILY
Status: DISCONTINUED | OUTPATIENT
Start: 2020-10-17 | End: 2020-10-19 | Stop reason: HOSPADM

## 2020-10-17 RX ORDER — ACETAMINOPHEN 160 MG/5ML
650 SOLUTION ORAL EVERY 4 HOURS PRN
Status: DISCONTINUED | OUTPATIENT
Start: 2020-10-17 | End: 2020-10-19 | Stop reason: HOSPADM

## 2020-10-17 RX ORDER — ASPIRIN 81 MG/1
81 TABLET ORAL DAILY
Status: DISCONTINUED | OUTPATIENT
Start: 2020-10-17 | End: 2020-10-19 | Stop reason: HOSPADM

## 2020-10-17 RX ORDER — ACETAMINOPHEN 325 MG/1
650 TABLET ORAL EVERY 4 HOURS PRN
Status: DISCONTINUED | OUTPATIENT
Start: 2020-10-17 | End: 2020-10-19 | Stop reason: HOSPADM

## 2020-10-17 RX ORDER — ONDANSETRON 4 MG/1
4 TABLET, FILM COATED ORAL EVERY 6 HOURS PRN
Status: DISCONTINUED | OUTPATIENT
Start: 2020-10-17 | End: 2020-10-19 | Stop reason: HOSPADM

## 2020-10-17 RX ORDER — ALBUTEROL SULFATE 2.5 MG/3ML
2.5 SOLUTION RESPIRATORY (INHALATION) EVERY 4 HOURS PRN
Status: DISCONTINUED | OUTPATIENT
Start: 2020-10-17 | End: 2020-10-19 | Stop reason: HOSPADM

## 2020-10-17 RX ORDER — NALOXONE HCL 0.4 MG/ML
0.4 VIAL (ML) INJECTION
Status: DISCONTINUED | OUTPATIENT
Start: 2020-10-17 | End: 2020-10-18

## 2020-10-17 RX ORDER — BENZONATATE 100 MG/1
200 CAPSULE ORAL 3 TIMES DAILY PRN
Status: DISCONTINUED | OUTPATIENT
Start: 2020-10-17 | End: 2020-10-19 | Stop reason: HOSPADM

## 2020-10-17 RX ORDER — MORPHINE SULFATE 2 MG/ML
INJECTION, SOLUTION INTRAMUSCULAR; INTRAVENOUS
Status: COMPLETED
Start: 2020-10-17 | End: 2020-10-17

## 2020-10-17 RX ORDER — SODIUM CHLORIDE 0.9 % (FLUSH) 0.9 %
10 SYRINGE (ML) INJECTION EVERY 12 HOURS SCHEDULED
Status: DISCONTINUED | OUTPATIENT
Start: 2020-10-17 | End: 2020-10-19 | Stop reason: HOSPADM

## 2020-10-17 RX ORDER — MORPHINE SULFATE 2 MG/ML
2 INJECTION, SOLUTION INTRAMUSCULAR; INTRAVENOUS ONCE
Status: COMPLETED | OUTPATIENT
Start: 2020-10-17 | End: 2020-10-17

## 2020-10-17 RX ORDER — ACETAMINOPHEN 650 MG/1
650 SUPPOSITORY RECTAL EVERY 4 HOURS PRN
Status: DISCONTINUED | OUTPATIENT
Start: 2020-10-17 | End: 2020-10-19 | Stop reason: HOSPADM

## 2020-10-17 RX ORDER — TRAMADOL HYDROCHLORIDE 50 MG/1
50 TABLET ORAL EVERY 6 HOURS PRN
Status: DISCONTINUED | OUTPATIENT
Start: 2020-10-17 | End: 2020-10-19 | Stop reason: HOSPADM

## 2020-10-17 RX ORDER — SODIUM CHLORIDE 0.9 % (FLUSH) 0.9 %
10 SYRINGE (ML) INJECTION AS NEEDED
Status: DISCONTINUED | OUTPATIENT
Start: 2020-10-17 | End: 2020-10-19 | Stop reason: HOSPADM

## 2020-10-17 RX ORDER — POLYETHYLENE GLYCOL 3350 17 G/17G
17 POWDER, FOR SOLUTION ORAL DAILY
Status: DISCONTINUED | OUTPATIENT
Start: 2020-10-17 | End: 2020-10-19 | Stop reason: HOSPADM

## 2020-10-17 RX ORDER — BUDESONIDE AND FORMOTEROL FUMARATE DIHYDRATE 160; 4.5 UG/1; UG/1
2 AEROSOL RESPIRATORY (INHALATION)
Status: DISCONTINUED | OUTPATIENT
Start: 2020-10-17 | End: 2020-10-19 | Stop reason: HOSPADM

## 2020-10-17 RX ADMIN — MORPHINE SULFATE 2 MG: 2 INJECTION, SOLUTION INTRAMUSCULAR; INTRAVENOUS at 17:21

## 2020-10-17 RX ADMIN — SODIUM CHLORIDE 75 ML/HR: 9 INJECTION, SOLUTION INTRAVENOUS at 20:07

## 2020-10-17 RX ADMIN — MORPHINE SULFATE 2 MG: 2 INJECTION, SOLUTION INTRAMUSCULAR; INTRAVENOUS at 19:25

## 2020-10-17 RX ADMIN — MORPHINE SULFATE 2 MG: 2 INJECTION, SOLUTION INTRAMUSCULAR; INTRAVENOUS at 20:53

## 2020-10-17 RX ADMIN — ONDANSETRON HYDROCHLORIDE 4 MG: 4 TABLET, FILM COATED ORAL at 20:07

## 2020-10-17 RX ADMIN — IOPAMIDOL 80 ML: 612 INJECTION, SOLUTION INTRAVENOUS at 17:31

## 2020-10-17 NOTE — ED PROVIDER NOTES
Subjective   Patient had a ground-level fall going from the chair to her walker.  During the fall patient sustained an injury to her left flank after landing on some brick work around the fireplace.  No loss of consciousness or any other injury.  Patient lives alone.      Fall  Mechanism of injury: fall    Injury location:  Torso  Torso injury location:  L flank and back  Incident location:  Home  Time since incident:  1 hour  Fall:     Fall occurred:  Standing    Height of fall:  GLF    Entrapped after fall: no    Protective equipment: none    Suspicion of alcohol use: no    Suspicion of drug use: no    Prior to arrival data:     Bystander interventions:  None    Blood loss:  None    Responsiveness at scene:  Alert    Loss of consciousness: no      Amnesic to event: no      Airway interventions:  None  Associated symptoms: back pain    Associated symptoms: no abdominal pain, no chest pain, no headaches, no nausea, no neck pain, no seizures and no vomiting        Review of Systems   Constitutional: Positive for activity change. Negative for appetite change, chills, diaphoresis, fatigue and fever.   HENT: Negative for congestion, ear discharge, ear pain, nosebleeds, rhinorrhea, sinus pressure, sore throat and trouble swallowing.    Eyes: Negative for discharge and redness.   Respiratory: Negative for apnea, cough, chest tightness, shortness of breath and wheezing.    Cardiovascular: Negative for chest pain.   Gastrointestinal: Negative for abdominal pain, diarrhea, nausea and vomiting.   Endocrine: Negative for polyuria.   Genitourinary: Negative for dysuria, frequency and urgency.   Musculoskeletal: Positive for back pain. Negative for myalgias and neck pain.   Skin: Negative for color change and rash.   Allergic/Immunologic: Negative for immunocompromised state.   Neurological: Negative for dizziness, seizures, syncope, weakness, light-headedness and headaches.   Hematological: Negative for adenopathy. Does not  bruise/bleed easily.   Psychiatric/Behavioral: Negative for behavioral problems and confusion.   All other systems reviewed and are negative.      Past Medical History:   Diagnosis Date   • Abscess of buttock    • Anorectal abscess    • Benign essential hypertension    • Benign hypertension    • Carotid artery stenosis    • Cellulitis, abdominal wall    • Closed fracture of femur (CMS/[facility])    • COPD (chronic obstructive pulmonary disease) (CMS/[facility])    • Hip pain    • Hyperglycemia    • Hyperlipidemia    • Influenza vaccine needed    • Osteoarthritis    • Primary generalized (osteo)arthritis    • S/P breast biopsy, right        No Known Allergies    Past Surgical History:   Procedure Laterality Date   • INJECTION OF MEDICATION  07/12/2016    B12 (3)   • INJECTION OF MEDICATION  05/04/2016    Depo Medrol (Methylprednisone) (1)   • INJECTION OF MEDICATION  10/27/2015    Depo Medrol (Methylprednisone) 80mg (8)   • INJECTION OF MEDICATION  10/03/2012    Rocephin (1)   • INJECTION OF MEDICATION  06/26/2015    Toradol (6)   • INTRAMEDULLARY NAILING/RODDING HUMERUS, TIBIA, FEMUR  03/14/2014    Orthotic procedure       Family History   Problem Relation Age of Onset   • Ovarian cancer Mother         went to colon   • Lung cancer Father        Social History     Socioeconomic History   • Marital status:      Spouse name: Neha   • Number of children: 3   • Years of education: Not on file   • Highest education level: Not on file   Tobacco Use   • Smoking status: Never Smoker   • Smokeless tobacco: Never Used   Substance and Sexual Activity   • Alcohol use: No   • Drug use: No   • Sexual activity: Not Currently     Partners: Male           Objective   Physical Exam  Vitals signs and nursing note reviewed.   Constitutional:       Appearance: She is well-developed.   HENT:      Head: Normocephalic and atraumatic.      Nose: Nose normal.   Eyes:      General: No scleral icterus.        Right eye: No discharge.         Left  eye: No discharge.      Conjunctiva/sclera: Conjunctivae normal.      Pupils: Pupils are equal, round, and reactive to light.   Neck:      Musculoskeletal: Normal range of motion and neck supple.      Trachea: No tracheal deviation.   Cardiovascular:      Rate and Rhythm: Normal rate and regular rhythm.      Heart sounds: Normal heart sounds. No murmur.   Pulmonary:      Effort: Pulmonary effort is normal. No respiratory distress.      Breath sounds: Normal breath sounds. No stridor. No wheezing or rales.   Abdominal:      General: Bowel sounds are normal. There is no distension.      Palpations: Abdomen is soft. There is no mass.      Tenderness: There is no abdominal tenderness. There is no guarding or rebound.   Musculoskeletal:      Thoracic back: She exhibits tenderness, bony tenderness and pain. She exhibits no swelling, no deformity and no laceration.        Back:    Skin:     General: Skin is warm and dry.      Findings: No erythema or rash.   Neurological:      Mental Status: She is alert and oriented to person, place, and time.      Coordination: Coordination normal.   Psychiatric:         Behavior: Behavior normal.         Thought Content: Thought content normal.         Procedures           ED Course             Labs Reviewed   COMPREHENSIVE METABOLIC PANEL - Abnormal; Notable for the following components:       Result Value    Glucose 105 (*)     Sodium 135 (*)     Albumin 3.40 (*)     All other components within normal limits    Narrative:     GFR Normal >60  Chronic Kidney Disease <60  Kidney Failure <15     CBC WITH AUTO DIFFERENTIAL - Abnormal; Notable for the following components:    WBC 13.85 (*)     RBC 3.43 (*)     Hemoglobin 10.5 (*)     Hematocrit 32.4 (*)     Lymphocyte % 18.7 (*)     Immature Grans % 0.9 (*)     Neutrophils, Absolute 9.76 (*)     Monocytes, Absolute 1.17 (*)     Immature Grans, Absolute 0.13 (*)     All other components within normal limits   CK - Normal   URINALYSIS W/  CULTURE IF INDICATED   CBC AND DIFFERENTIAL    Narrative:     The following orders were created for panel order CBC & Differential.  Procedure                               Abnormality         Status                     ---------                               -----------         ------                     CBC Auto Differential[[id number]]        Abnormal            Final result                 Please view results for these tests on the individual orders.   EXTRA TUBES    Narrative:     The following orders were created for panel order Extra Tubes.  Procedure                               Abnormality         Status                     ---------                               -----------         ------                     Light Blue Top[[id number]]                                   Final result               Gold Top - SST[[id number]]                                   Final result                 Please view results for these tests on the individual orders.   LIGHT BLUE TOP   GOLD TOP - SST       CT Chest With Contrast   Final Result   Addendum 1 of 1    ADDENDUM    ADDENDUM #1          Upon further review there are nondisplaced fractures of the   posterior axillary aspects of the left fifth and sixth ribs and   perhaps the left seventh and ninth ribs.      Electronically signed by:  Arturo Sparks MD  10/17/2020 6:50 PM   CDT Workstation: [id number]         Final                                          [facility]    Final diagnoses:   Fall, initial encounter   Closed fracture of multiple ribs of left side, initial encounter            Lake Harley MD  10/17/20 0791

## 2020-10-17 NOTE — ED NOTES
EMS gave Morphine 2mg and Zofran 4mg prior to arrival to ED.      Sabi Galarza RN  10/17/20 1535

## 2020-10-18 LAB
ANION GAP SERPL CALCULATED.3IONS-SCNC: 8 MMOL/L (ref 5–15)
BASOPHILS # BLD AUTO: 0.04 10*3/MM3 (ref 0–0.2)
BASOPHILS NFR BLD AUTO: 0.4 % (ref 0–1.5)
BUN SERPL-MCNC: 12 MG/DL (ref 8–23)
BUN/CREAT SERPL: 17.6 (ref 7–25)
CALCIUM SPEC-SCNC: 8.7 MG/DL (ref 8.2–9.6)
CHLORIDE SERPL-SCNC: 100 MMOL/L (ref 98–107)
CO2 SERPL-SCNC: 28 MMOL/L (ref 22–29)
CREAT SERPL-MCNC: 0.68 MG/DL (ref 0.57–1)
DEPRECATED RDW RBC AUTO: 49.6 FL (ref 37–54)
EOSINOPHIL # BLD AUTO: 0.1 10*3/MM3 (ref 0–0.4)
EOSINOPHIL NFR BLD AUTO: 1 % (ref 0.3–6.2)
ERYTHROCYTE [DISTWIDTH] IN BLOOD BY AUTOMATED COUNT: 14.2 % (ref 12.3–15.4)
GFR SERPL CREATININE-BSD FRML MDRD: 81 ML/MIN/1.73
GLUCOSE SERPL-MCNC: 100 MG/DL (ref 65–99)
HCT VFR BLD AUTO: 31.8 % (ref 34–46.6)
HGB BLD-MCNC: 10.1 G/DL (ref 12–15.9)
IMM GRANULOCYTES # BLD AUTO: 0.05 10*3/MM3 (ref 0–0.05)
IMM GRANULOCYTES NFR BLD AUTO: 0.5 % (ref 0–0.5)
LYMPHOCYTES # BLD AUTO: 1.97 10*3/MM3 (ref 0.7–3.1)
LYMPHOCYTES NFR BLD AUTO: 19 % (ref 19.6–45.3)
MCH RBC QN AUTO: 30.1 PG (ref 26.6–33)
MCHC RBC AUTO-ENTMCNC: 31.8 G/DL (ref 31.5–35.7)
MCV RBC AUTO: 94.9 FL (ref 79–97)
MONOCYTES # BLD AUTO: 1.11 10*3/MM3 (ref 0.1–0.9)
MONOCYTES NFR BLD AUTO: 10.7 % (ref 5–12)
NEUTROPHILS NFR BLD AUTO: 68.4 % (ref 42.7–76)
NEUTROPHILS NFR BLD AUTO: 7.12 10*3/MM3 (ref 1.7–7)
NRBC BLD AUTO-RTO: 0 /100 WBC (ref 0–0.2)
PLATELET # BLD AUTO: 408 10*3/MM3 (ref 140–450)
PMV BLD AUTO: 9.2 FL (ref 6–12)
POTASSIUM SERPL-SCNC: 4.2 MMOL/L (ref 3.5–5.2)
RBC # BLD AUTO: 3.35 10*6/MM3 (ref 3.77–5.28)
SODIUM SERPL-SCNC: 136 MMOL/L (ref 136–145)
WBC # BLD AUTO: 10.39 10*3/MM3 (ref 3.4–10.8)

## 2020-10-18 PROCEDURE — 94799 UNLISTED PULMONARY SVC/PX: CPT

## 2020-10-18 PROCEDURE — 85025 COMPLETE CBC W/AUTO DIFF WBC: CPT | Performed by: INTERNAL MEDICINE

## 2020-10-18 PROCEDURE — 25010000002 ONDANSETRON PER 1 MG: Performed by: INTERNAL MEDICINE

## 2020-10-18 PROCEDURE — 96375 TX/PRO/DX INJ NEW DRUG ADDON: CPT

## 2020-10-18 PROCEDURE — 25010000002 PROMETHAZINE PER 50 MG: Performed by: INTERNAL MEDICINE

## 2020-10-18 PROCEDURE — 80048 BASIC METABOLIC PNL TOTAL CA: CPT | Performed by: INTERNAL MEDICINE

## 2020-10-18 PROCEDURE — 97166 OT EVAL MOD COMPLEX 45 MIN: CPT

## 2020-10-18 PROCEDURE — 97162 PT EVAL MOD COMPLEX 30 MIN: CPT

## 2020-10-18 PROCEDURE — 96376 TX/PRO/DX INJ SAME DRUG ADON: CPT

## 2020-10-18 PROCEDURE — 63710000001 ONDANSETRON PER 8 MG: Performed by: INTERNAL MEDICINE

## 2020-10-18 PROCEDURE — 25010000002 MORPHINE PER 10 MG: Performed by: INTERNAL MEDICINE

## 2020-10-18 PROCEDURE — 96361 HYDRATE IV INFUSION ADD-ON: CPT

## 2020-10-18 PROCEDURE — G0378 HOSPITAL OBSERVATION PER HR: HCPCS

## 2020-10-18 RX ORDER — HYDROCODONE BITARTRATE AND ACETAMINOPHEN 5; 325 MG/1; MG/1
1 TABLET ORAL EVERY 6 HOURS PRN
Status: DISCONTINUED | OUTPATIENT
Start: 2020-10-18 | End: 2020-10-19 | Stop reason: HOSPADM

## 2020-10-18 RX ORDER — MORPHINE SULFATE 2 MG/ML
1 INJECTION, SOLUTION INTRAMUSCULAR; INTRAVENOUS EVERY 4 HOURS PRN
Status: DISCONTINUED | OUTPATIENT
Start: 2020-10-18 | End: 2020-10-19 | Stop reason: HOSPADM

## 2020-10-18 RX ORDER — HYDROCODONE BITARTRATE AND ACETAMINOPHEN 5; 325 MG/1; MG/1
1 TABLET ORAL EVERY 6 HOURS PRN
Qty: 12 TABLET | Refills: 0 | Status: SHIPPED | OUTPATIENT
Start: 2020-10-18 | End: 2020-10-21

## 2020-10-18 RX ORDER — NALOXONE HCL 0.4 MG/ML
0.4 VIAL (ML) INJECTION
Status: DISCONTINUED | OUTPATIENT
Start: 2020-10-18 | End: 2020-10-19 | Stop reason: HOSPADM

## 2020-10-18 RX ADMIN — TRAMADOL HYDROCHLORIDE 50 MG: 50 TABLET, FILM COATED ORAL at 08:22

## 2020-10-18 RX ADMIN — ASPIRIN 81 MG: 81 TABLET, COATED ORAL at 08:22

## 2020-10-18 RX ADMIN — MORPHINE SULFATE 2 MG: 2 INJECTION, SOLUTION INTRAMUSCULAR; INTRAVENOUS at 03:23

## 2020-10-18 RX ADMIN — BUDESONIDE AND FORMOTEROL FUMARATE DIHYDRATE 2 PUFF: 160; 4.5 AEROSOL RESPIRATORY (INHALATION) at 19:13

## 2020-10-18 RX ADMIN — ONDANSETRON HYDROCHLORIDE 4 MG: 2 INJECTION, SOLUTION INTRAMUSCULAR; INTRAVENOUS at 03:28

## 2020-10-18 RX ADMIN — ONDANSETRON HYDROCHLORIDE 4 MG: 4 TABLET, FILM COATED ORAL at 11:07

## 2020-10-18 RX ADMIN — POLYETHYLENE GLYCOL 3350 17 G: 17 POWDER, FOR SOLUTION ORAL at 08:22

## 2020-10-18 RX ADMIN — ATORVASTATIN CALCIUM 10 MG: 10 TABLET, FILM COATED ORAL at 08:22

## 2020-10-18 RX ADMIN — PROMETHAZINE HYDROCHLORIDE 6.25 MG: 25 INJECTION INTRAMUSCULAR; INTRAVENOUS at 13:06

## 2020-10-18 RX ADMIN — SODIUM CHLORIDE 75 ML/HR: 9 INJECTION, SOLUTION INTRAVENOUS at 08:21

## 2020-10-18 NOTE — H&P
[facility]  INPATIENT HISTORY AND PHYSICAL       Patient Care Team:  Ivon Evans APRN as PCP - General (Family Medicine)  Stephanie Belle MD as Consulting Physician (Hematology and Oncology)    Chief complaint   Chief Complaint   Patient presents with   • Fall   • Rib Pain       Subjective     Patient is a 89 y.o. female with history of pulmonary fibrosis/COPD, dyslipidemia, GERD, osteoarthritis presents to the emergency room secondary to left-sided chest wall pain.  Patient lives alone and reports that she slipped off her footwear and landed on on the brick lining her fireplace with impact on her left chest wall.    She denies any loss of consciousness, preceding dizziness, chest pain, shortness of air, fever or chills.  On triage she was hemodynamically stable.  She had CT scan of the chest which showed nondisplaced fractures of the posterior aspect of the left fifth, sixth ,seventh and ninth ribs.  She was started on IV morphine and referred for admission primarily for pain control.      Review of Systems   Constitutional: Positive for activity change and fatigue. Negative for appetite change, chills, diaphoresis and fever.   HENT: Negative for trouble swallowing and voice change.    Eyes: Negative for photophobia and visual disturbance.   Respiratory: Negative for cough, choking, chest tightness, shortness of breath, wheezing and stridor.    Cardiovascular: Negative for chest pain, palpitations and leg swelling.   Gastrointestinal: Negative for abdominal distention, abdominal pain, blood in stool, constipation, diarrhea, nausea and vomiting.   Endocrine: Negative for cold intolerance, heat intolerance, polydipsia, polyphagia and polyuria.   Genitourinary: Negative for decreased urine volume, difficulty urinating, dysuria, enuresis, flank pain, frequency, hematuria and urgency.   Musculoskeletal: Positive for arthralgias, gait problem and  myalgias. Negative for neck pain and neck stiffness.   Skin: Negative for pallor, rash and wound.   Neurological: Negative for dizziness, tremors, seizures, syncope, facial asymmetry, speech difficulty, weakness, light-headedness, numbness and headaches.   Hematological: Does not bruise/bleed easily.   Psychiatric/Behavioral: Negative for agitation, behavioral problems and confusion.         History  Past Medical History:   Diagnosis Date   • Abscess of buttock    • Anorectal abscess    • Benign essential hypertension    • Benign hypertension    • Carotid artery stenosis    • Cellulitis, abdominal wall    • Closed fracture of femur (CMS/[facility])    • COPD (chronic obstructive pulmonary disease) (CMS/[facility])    • Hip pain    • Hyperglycemia    • Hyperlipidemia    • Influenza vaccine needed    • Osteoarthritis    • Primary generalized (osteo)arthritis    • S/P breast biopsy, right      Past Surgical History:   Procedure Laterality Date   • INJECTION OF MEDICATION  07/12/2016    B12 (3)   • INJECTION OF MEDICATION  05/04/2016    Depo Medrol (Methylprednisone) (1)   • INJECTION OF MEDICATION  10/27/2015    Depo Medrol (Methylprednisone) 80mg (8)   • INJECTION OF MEDICATION  10/03/2012    Rocephin (1)   • INJECTION OF MEDICATION  06/26/2015    Toradol (6)   • INTRAMEDULLARY NAILING/RODDING HUMERUS, TIBIA, FEMUR  03/14/2014    Orthotic procedure     Family History   Problem Relation Age of Onset   • Ovarian cancer Mother         went to colon   • Lung cancer Father      Social History     Tobacco Use   • Smoking status: Never Smoker   • Smokeless tobacco: Never Used   Substance Use Topics   • Alcohol use: No   • Drug use: No     (Not in a hospital admission)    Allergies:  Patient has no known allergies.  Prior to Admission medications    Medication Sig Start Date End Date Taking? Authorizing Provider   albuterol sulfate  (90 Base) MCG/ACT inhaler Inhale 2 puffs Every 4 (Four) Hours As Needed for Wheezing or Shortness of  Air. 11/22/19   Casi Sparks MD   aspirin 81 MG EC tablet 1 tablet, delayed release (DR/EC) at bedtime Oral    ProviderJoselyn MD   atorvastatin (LIPITOR) 10 MG tablet TAKE 1 TABLET DAILY 3/9/20   Ivon Evans APRN   benzonatate (TESSALON) 200 MG capsule Take 1 capsule by mouth 3 (Three) Times a Day As Needed for Cough. 8/14/20   Casi Sparks MD   Biotin 59880 MCG tablet dispersible Take  by mouth.    Provider, MD Joeslyn   Calcium Citrate-Vitamin D (CITRACAL + D PO) 1 tablet, chewable every other day Oral    Provider, MD Joselyn   celecoxib (CeleBREX) 200 MG capsule TAKE 1 CAPSULE DAILY 3/9/20   Ivon Evans APRN   Cyanocobalamin (B-12) 1000 MCG tablet Take  by mouth.    Provider, MD Joselyn   Fluticasone Furoate-Vilanterol (BREO ELLIPTA) 200-25 MCG/INH inhaler Inhale 1 puff Daily. 11/22/19   Casi Sparks MD   Multiple Vitamins-Minerals (CENTRUM SILVER PO) 1 tablet every other day Oral    Provider, MD Joselyn   omeprazole (priLOSEC) 40 MG capsule Take 1 capsule by mouth Daily. 6/8/20   Ivon Evans APRN   polyethylene glycol (MIRALAX) packet Take 17 g by mouth Daily.    Provider, MD Joselyn   senna (SENOKOT) 8.6 MG tablet tablet 2 tablet every day with supper Oral PRN    Provider, MD Joselyn   traMADol (ULTRAM) 50 MG tablet Take 1 tablet by mouth Every 6 (Six) Hours As Needed for Moderate Pain . 2/10/20   Ivon Evans APRN       Objective        Vital Signs  Temp:  [98.5 °F (36.9 °C)] 98.5 °F (36.9 °C)  Heart Rate:  [86] 86  Resp:  [18] 18  BP: (159)/(70) 159/70      Physical Exam  Vitals signs and nursing note reviewed.   Constitutional:       General: She is not in acute distress.     Appearance: She is underweight. She is not diaphoretic.   HENT:      Head: Normocephalic and atraumatic.      Right Ear: External ear normal.      Left Ear: External ear normal.      Nose: Nose normal.   Eyes:      Conjunctiva/sclera: Conjunctivae normal.      Pupils: Pupils  are equal, round, and reactive to light.   Neck:      Musculoskeletal: Normal range of motion and neck supple.      Thyroid: No thyromegaly.      Vascular: No JVD.   Cardiovascular:      Rate and Rhythm: Normal rate and regular rhythm.      Heart sounds: Normal heart sounds. No murmur. No friction rub. No gallop.    Pulmonary:      Effort: Pulmonary effort is normal. No respiratory distress.      Breath sounds: Normal breath sounds. No stridor. No wheezing or rales.   Chest:      Chest wall: Tenderness present.      Comments: She has reproducible pain and tenderness on the left lateral chest wall.  Abdominal:      General: Bowel sounds are normal. There is no distension.      Palpations: Abdomen is soft. There is no mass.      Tenderness: There is no abdominal tenderness. There is no guarding or rebound.      Hernia: No hernia is present.   Musculoskeletal: Normal range of motion.         General: No tenderness or deformity.   Skin:     General: Skin is warm and dry.      Coloration: Skin is not pale.      Findings: No erythema or rash.   Neurological:      Mental Status: She is alert and oriented to person, place, and time.      Deep Tendon Reflexes: Reflexes are normal and symmetric.   Psychiatric:         Behavior: Behavior normal. Behavior is cooperative.         Thought Content: Thought content normal.         Judgment: Judgment normal.           Results Review:     Results from last 7 days   Lab Units 10/17/20  1720 10/13/20  0949   SODIUM mmol/L 135* 142   POTASSIUM mmol/L 3.9 5.1*   CHLORIDE mmol/L 98 107   CO2 mmol/L 26.0 29.0   BUN mg/dL 15 14   CREATININE mg/dL 0.81 0.69   GLUCOSE mg/dL 105* 105*   CALCIUM mg/dL 9.2 9.3   BILIRUBIN mg/dL 0.2 0.4   ALK PHOS U/L 65 90   ALT (SGPT) U/L 12 10   AST (SGOT) U/L 21 22             Results from last 7 days   Lab Units 10/17/20  1720 10/13/20  0949   WBC 10*3/mm3 13.85* 10.98*   HEMOGLOBIN g/dL 10.5* 11.2*   HEMATOCRIT % 32.4* 35.2   PLATELETS 10*3/mm3 430 449            Imaging Results (Last 7 Days)     Procedure Component Value Units Date/Time    CT Chest With Contrast [[id number]] Collected: 10/17/20 1729     Updated: 10/17/20 1851    Addenda:         ADDENDUM   ADDENDUM #1       Upon further review there are nondisplaced fractures of the  posterior axillary aspects of the left fifth and sixth ribs and  perhaps the left seventh and ninth ribs.    Electronically signed by:  Arturo Sparks MD  10/17/2020 6:50 PM  CDT Workstation: [id number]    Signed: 10/17/20 1850 by Vanita Sparks MD    Narrative:      CT Chest With Contrast    History: Posttraumatic chest pain. Possible rib fracture.    Axial spiral scans of the chest were obtained with  intravenous  contrast.  Coronal and sagital reconstructions were preformed.    This exam was performed according to our departmental  dose-optimization program, which includes automated exposure  control, adjustment of the mA and/or kV according to patient size  and/or use of iterative reconstruction technique.    DLP: 148.10    Comparison: April 8, 2019    Findings:  Chronic obstructive pulmonary disease with chronic interstitial  changes and honeycombing.  Subsegmental atelectasis or infiltrate posteriorly and inferiorly  in the right lower lobe.  No mass or noncalcified nodule.  No pleural effusion.  No pneumothorax.    No hilar, mediastinal or axillary adenopathy.  No thoracic aortic aneurysm.  No pericardial effusion.  Cardiomegaly.  Coronary artery calcifications.    Splenic granulomata.    Minimally displaced and minimally impacted fracture proximal body  of the sternum.  No rib fracture identified.  Degenerative changes and degenerative disc disease in the  thoracic spine.  Degenerative disc disease lumbar spine.  Moderate dextroscoliosis thoracolumbar spine.  Advanced degenerative changes each shoulder.      Impression:      Conclusion:  Minimally displaced and minimally impacted fracture proximal body  of the  sternum.  No rib fracture identified.  Chronic obstructive pulmonary disease with chronic interstitial  changes and honeycombing.  Subsegmental atelectasis or infiltrate posteriorly and inferiorly  in the right lower lobe.  Cardiomegaly.  Coronary artery calcifications.    [id number]    Electronically signed by:  Arturo Sparks MD  10/17/2020 6:10 PM  CDT Workstation: [id number]          Assessment / Plan       Hospital Problem List:  Active Problems:    Fall    Multiple left-sided rib fractures post fall: Admit for observation/pain control.  Consult PT and OT.    History of pulmonary fibrosis/COPD: Patient is not in exacerbation.  Continue bronchodilators and noninvasive respiratory therapy.    History of osteoarthritis: Continue home medications.    Dyslipidemia: Continue statin.    GERD: Continue PPI.    Begin DVT prophylaxis.    Additional orders and treatment plan as hospital course dictates.    I discussed the patient's findings and my recommendations with patient and her daughter.     Lacho Munson MD  10/17/20  19:21 CDT      Dictated Utilizing Dragon Dictation

## 2020-10-18 NOTE — PLAN OF CARE
Problem: Adult Inpatient Plan of Care  Goal: Plan of Care Review  Flowsheets  Taken 10/18/2020 1332  Plan of Care Reviewed With:   patient   daughter  Outcome Summary:   OT eval completed; co-eval with PT. Pt agreeable to therapies, however largely limited by pain. Pt performed supine<>sit with max A of 2. While seated EOB, pt required max A for LBD 2/2 pain with minimal movement. Pt performed sit<>Stand with min A of 2 using RW. Pt amb to bathroom using RW with increased time and min A of 1. Pt unable to complete toilet transfer as she was unable to lower herself to sitting on commode due to increased pain. Pt dght observed entirity of session and reports plans for pt to return home with 24/7 assist from family. Pt dght participated in caregiver training of safely assisting pt with ADLs and t/fs. Pt dght also edu on purpose and use of gait belt. Rec home with 24/7 assist and HH OT at d/c. Cont OT POC to progress to highest level of independence.  Taken 10/18/2020 1058  Plan of Care Reviewed With:   patient   daughter

## 2020-10-18 NOTE — PROGRESS NOTES
[facility]  INPATIENT PROGRESS NOTE    Length of Stay: 0  Date of Admission: 10/17/2020  Primary Care Physician: Ivon Evans APRN    Subjective   Chief Complaint: Nausea    HPI: Patient is seen for follow-up.  Patient is a 89 y.o. female with history of pulmonary fibrosis/COPD, dyslipidemia, GERD, osteoarthritis who was admitted for pain management following multiple left-sided rib fractures arising from a fall.  Pain control is improving but patient has been nauseated.    Review of Systems   Constitutional: Negative for activity change, appetite change, chills, diaphoresis, fatigue and fever.   HENT: Negative for trouble swallowing and voice change.    Eyes: Negative for photophobia and visual disturbance.   Respiratory: Negative for cough, choking, chest tightness, shortness of breath, wheezing and stridor.    Cardiovascular: Negative for chest pain, palpitations and leg swelling.   Gastrointestinal: Positive for nausea. Negative for abdominal distention, abdominal pain, blood in stool, constipation, diarrhea and vomiting.   Endocrine: Negative for cold intolerance, heat intolerance, polydipsia, polyphagia and polyuria.   Genitourinary: Negative for decreased urine volume, difficulty urinating, dysuria, enuresis, flank pain, frequency, hematuria and urgency.   Musculoskeletal: Negative for gait problem, neck pain and neck stiffness.   Skin: Negative for pallor, rash and wound.   Neurological: Negative for dizziness, tremors, seizures, syncope, facial asymmetry, speech difficulty, weakness, light-headedness, numbness and headaches.   Hematological: Does not bruise/bleed easily.   Psychiatric/Behavioral: Negative for agitation, behavioral problems and confusion.       Objective    Temp:  [97.5 °F (36.4 °C)-98.9 °F (37.2 °C)] 98 °F (36.7 °C)  Heart Rate:  [69-86] 73  Resp:  [14-18] 16  BP: (125-159)/(56-70) 135/65    Physical Exam  Vitals signs and  nursing note reviewed.   Constitutional:       General: She is not in acute distress.     Appearance: She is underweight. She is not diaphoretic.   HENT:      Head: Normocephalic and atraumatic.      Right Ear: External ear normal.      Left Ear: External ear normal.      Nose: Nose normal.   Eyes:      Conjunctiva/sclera: Conjunctivae normal.      Pupils: Pupils are equal, round, and reactive to light.   Neck:      Musculoskeletal: Normal range of motion and neck supple.      Thyroid: No thyromegaly.      Vascular: No JVD.   Cardiovascular:      Rate and Rhythm: Normal rate and regular rhythm.      Heart sounds: Normal heart sounds. No murmur. No friction rub. No gallop.    Pulmonary:      Effort: Pulmonary effort is normal. No respiratory distress.      Breath sounds: Normal breath sounds. No stridor. No wheezing or rales.   Chest:      Chest wall: No tenderness.      Comments: She has reproducible pain on left upper chest wall.  Abdominal:      General: Bowel sounds are normal. There is no distension.      Palpations: Abdomen is soft. There is no mass.      Tenderness: There is no abdominal tenderness. There is no guarding or rebound.      Hernia: No hernia is present.   Musculoskeletal: Normal range of motion.         General: No tenderness or deformity.   Skin:     General: Skin is warm and dry.      Coloration: Skin is not pale.      Findings: No erythema or rash.   Neurological:      General: No focal deficit present.      Mental Status: She is alert and oriented to person, place, and time.      Sensory: No sensory deficit.      Coordination: Coordination normal.      Deep Tendon Reflexes: Reflexes are normal and symmetric.   Psychiatric:         Mood and Affect: Mood normal.         Behavior: Behavior normal. Behavior is cooperative.         Thought Content: Thought content normal.         Judgment: Judgment normal.           Medication Review:    Current Facility-Administered Medications:   •  acetaminophen  (TYLENOL) tablet 650 mg, 650 mg, Oral, Q4H PRN **OR** acetaminophen (TYLENOL) 160 MG/5ML solution 650 mg, 650 mg, Oral, Q4H PRN **OR** acetaminophen (TYLENOL) suppository 650 mg, 650 mg, Rectal, Q4H PRN, Lacho Munson MD  •  albuterol (PROVENTIL) nebulizer solution 0.083% 2.5 mg/3mL, 2.5 mg, Nebulization, Q4H PRN, Lacho Munson MD  •  aspirin EC tablet 81 mg, 81 mg, Oral, Daily, Lacho Munson MD, 81 mg at 10/18/20 0822  •  atorvastatin (LIPITOR) tablet 10 mg, 10 mg, Oral, Daily, Lacho Munson MD, 10 mg at 10/18/20 0822  •  benzonatate (TESSALON) capsule 200 mg, 200 mg, Oral, TID PRN, Lacho Munson MD  •  budesonide-formoterol (SYMBICORT) 160-4.5 MCG/ACT inhaler 2 puff, 2 puff, Inhalation, BID - RT, Lacho Munson MD  •  HYDROcodone-acetaminophen (NORCO) 5-325 MG per tablet 1 tablet, 1 tablet, Oral, Q6H PRN, Lacho Munson MD  •  influenza vac split quad (FLUZONE,FLUARIX,AFLURIA,FLULAVAL) injection 0.5 mL, 0.5 mL, Intramuscular, During Hospitalization, Lacho Munson MD  •  morphine injection 1 mg, 1 mg, Intravenous, Q4H PRN **AND** naloxone (NARCAN) injection 0.4 mg, 0.4 mg, Intravenous, Q5 Min PRN, Lacho Munson MD  •  ondansetron (ZOFRAN) tablet 4 mg, 4 mg, Oral, Q6H PRN, 4 mg at 10/18/20 1107 **OR** ondansetron (ZOFRAN) injection 4 mg, 4 mg, Intravenous, Q6H PRN, Lacho Munson MD, 4 mg at 10/18/20 0328  •  pantoprazole (PROTONIX) EC tablet 40 mg, 40 mg, Oral, QAM, Lacho Munson MD  •  polyethylene glycol (MIRALAX) packet 17 g, 17 g, Oral, Daily, Lacho Munson MD, 17 g at 10/18/20 0822  •  senna (SENOKOT) tablet 1 tablet, 1 tablet, Oral, Nightly PRN, Lacho Munson MD  •  sodium chloride 0.9 % flush 10 mL, 10 mL, Intravenous, Q12H, Lacho Munson MD  •  sodium chloride 0.9 % flush 10 mL, 10 mL, Intravenous, PRN, Lacho Munson MD  •  sodium chloride 0.9 % infusion, 75 mL/hr, Intravenous, Continuous, Lacho Munson MD, Last  Rate: 75 mL/hr at 10/18/20 1256, 75 mL/hr at 10/18/20 1256  •  traMADol (ULTRAM) tablet 50 mg, 50 mg, Oral, Q6H PRN, Lacho Munson MD, 50 mg at 10/18/20 0822    Results Review:  I have reviewed the labs, radiology results, and diagnostic studies.    Laboratory Data:   Results from last 7 days   Lab Units 10/18/20  0516 10/17/20  1720 10/13/20  0949   SODIUM mmol/L 136 135* 142   POTASSIUM mmol/L 4.2 3.9 5.1*   CHLORIDE mmol/L 100 98 107   CO2 mmol/L 28.0 26.0 29.0   BUN mg/dL 12 15 14   CREATININE mg/dL 0.68 0.81 0.69   GLUCOSE mg/dL 100* 105* 105*   CALCIUM mg/dL 8.7 9.2 9.3   BILIRUBIN mg/dL  --  0.2 0.4   ALK PHOS U/L  --  65 90   ALT (SGPT) U/L  --  12 10   AST (SGOT) U/L  --  21 22   ANION GAP mmol/L 8.0 11.0 6.0     Estimated Creatinine Clearance: 32.5 mL/min (by C-G formula based on SCr of 0.68 mg/dL).          Results from last 7 days   Lab Units 10/18/20  0516 10/17/20  1720 10/13/20  0949   WBC 10*3/mm3 10.39 13.85* 10.98*   HEMOGLOBIN g/dL 10.1* 10.5* 11.2*   HEMATOCRIT % 31.8* 32.4* 35.2   PLATELETS 10*3/mm3 408 430 449           Culture Data:   No results found for: BLOODCX  No results found for: URINECX  No results found for: RESPCX  No results found for: WOUNDCX  No results found for: STOOLCX  No components found for: BODYFLD    Radiology Data:   Imaging Results (Last 24 Hours)     Procedure Component Value Units Date/Time    CT Chest With Contrast [[id number]] Collected: 10/17/20 1729     Updated: 10/17/20 1851    Addenda:         ADDENDUM   ADDENDUM #1       Upon further review there are nondisplaced fractures of the  posterior axillary aspects of the left fifth and sixth ribs and  perhaps the left seventh and ninth ribs.    Electronically signed by:  Arturo Sparks MD  10/17/2020 6:50 PM  CDT Workstation: [id number]    Signed: 10/17/20 6940 by Vanita Sparks MD    Narrative:      CT Chest With Contrast    History: Posttraumatic chest pain. Possible rib fracture.    Axial spiral scans  of the chest were obtained with  intravenous  contrast.  Coronal and sagital reconstructions were preformed.    This exam was performed according to our departmental  dose-optimization program, which includes automated exposure  control, adjustment of the mA and/or kV according to patient size  and/or use of iterative reconstruction technique.    DLP: 148.10    Comparison: April 8, 2019    Findings:  Chronic obstructive pulmonary disease with chronic interstitial  changes and honeycombing.  Subsegmental atelectasis or infiltrate posteriorly and inferiorly  in the right lower lobe.  No mass or noncalcified nodule.  No pleural effusion.  No pneumothorax.    No hilar, mediastinal or axillary adenopathy.  No thoracic aortic aneurysm.  No pericardial effusion.  Cardiomegaly.  Coronary artery calcifications.    Splenic granulomata.    Minimally displaced and minimally impacted fracture proximal body  of the sternum.  No rib fracture identified.  Degenerative changes and degenerative disc disease in the  thoracic spine.  Degenerative disc disease lumbar spine.  Moderate dextroscoliosis thoracolumbar spine.  Advanced degenerative changes each shoulder.      Impression:      Conclusion:  Minimally displaced and minimally impacted fracture proximal body  of the sternum.  No rib fracture identified.  Chronic obstructive pulmonary disease with chronic interstitial  changes and honeycombing.  Subsegmental atelectasis or infiltrate posteriorly and inferiorly  in the right lower lobe.  Cardiomegaly.  Coronary artery calcifications.    [id number]    Electronically signed by:  Arturo Sparks MD  10/17/2020 6:10 PM  CDT Workstation: [id number] have reviewed the patient's current medications.     Assessment/Plan     Hospital Problem List:  Active Problems:    Fall    Multiple left-sided rib fractures post fall: Continue pain control, PT and OT.   Nausea may be narcotic induced and will be treated symptomatically.     History  of pulmonary fibrosis/COPD: Patient is not in exacerbation.  Continue bronchodilators and noninvasive respiratory therapy.     History of osteoarthritis: Continue home medications.     Dyslipidemia: Continue statin.     GERD: Continue PPI.     Continue DVT prophylaxis    Discharge Planning: Discharge in a.m. with home health.    Lacho Munson MD   10/18/20   13:58 CDT

## 2020-10-18 NOTE — THERAPY EVALUATION
Acute Care - Occupational Therapy Initial Evaluation  [facility]     Patient Name: Monserrat Downs  : 1930  MRN: [id number]  Today's Date: 10/18/2020  Onset of Illness/Injury or Date of Surgery: 10/17/20  Date of Referral to OT: 10/18/20  Referring Physician: Dr. Munson    Admit Date: 10/17/2020       ICD-10-CM ICD-9-CM   1. Fall, initial encounter  W19.XXXA E888.9   2. Closed fracture of multiple ribs of left side, initial encounter  S22.42XA 807.09   3. Impaired physical mobility  Z74.09 781.99   4. Impaired mobility and ADLs  Z74.09 V49.89    Z78.9      Patient Active Problem List   Diagnosis   • UIP (usual interstitial pneumonitis) (CMS/HCC)   • Chronic cough   • Constipation   • Chronic pain of both knees   • Generalized OA   • Hyperglycemia   • Hyperlipidemia   • B12 deficiency   • Primary osteoarthritis of both knees   • Radiculopathy of leg   • Trochanteric bursitis of right hip   • Chronic low back pain   • Scoliosis (and kyphoscoliosis), idiopathic   • Abscess   • Perirectal abscess   • Carotid artery stenosis   • Benign essential hypertension   • Interstitial pneumonitis (CMS/HCC)   • Environmental allergies   • Advanced age   • Mild intermittent asthma without complication   • Arthralgia   • Abnormal blood finding   • Fall     Past Medical History:   Diagnosis Date   • Abscess of buttock    • Anorectal abscess    • Benign essential hypertension    • Benign hypertension    • Carotid artery stenosis    • Cellulitis, abdominal wall    • Closed fracture of femur (CMS/HCC)    • COPD (chronic obstructive pulmonary disease) (CMS/HCC)    • Hip pain    • Hyperglycemia    • Hyperlipidemia    • Influenza vaccine needed    • Osteoarthritis    • Primary generalized (osteo)arthritis    • S/P breast biopsy, right      Past Surgical History:   Procedure Laterality Date   • INJECTION OF MEDICATION  2016    B12 (3)   • INJECTION OF MEDICATION  2016    Depo Medrol (Methylprednisone) (1)   •  INJECTION OF MEDICATION  10/27/2015    Depo Medrol (Methylprednisone) 80mg (8)   • INJECTION OF MEDICATION  10/03/2012    Rocephin (1)   • INJECTION OF MEDICATION  06/26/2015    Toradol (6)   • INTRAMEDULLARY NAILING/RODDING HUMERUS, TIBIA, FEMUR  03/14/2014    Orthotic procedure          OT ASSESSMENT FLOWSHEET (last 12 hours)      OT Evaluation and Treatment     Row Name 10/18/20 1058                   OT Time and Intention    Subjective Information  complains of;pain;nausea/vomiting  -AS        Document Type  evaluation  -AS        Mode of Treatment  co-treatment;occupational therapy;physical therapy  -AS        Total Minutes, Occupational Therapy  54  -AS        Patient Effort  adequate  -AS        Symptoms Noted During/After Treatment  increased pain;nausea  -AS           General Information    Patient Profile Reviewed  yes  -AS        Onset of Illness/Injury or Date of Surgery  10/17/20  -AS        Referring Physician  Dr. Munson  -AS        Patient/Family/Caregiver Comments/Observations  pt dght present for eval  -AS        General Observations of Patient  supine in bed, RA, IV  -AS        Prior Level of Function  independent:;all household mobility;gait;transfer;ADL's  -AS        Equipment Currently Used at Home  walker, rolling;other (see comments);raised toilet walk-in tub; transport chair  -AS        Pertinent History of Current Functional Problem  fall resulting in multiple rib fxs  -AS        Existing Precautions/Restrictions  fall  -AS        Equipment Issued to Patient  gait belt  -AS        Risks Reviewed  patient and family:;LOB;dizziness;nausea/vomiting;increased discomfort;change in vital signs;increased drainage;lines disloged  -AS        Benefits Reviewed  patient and family:;improve function;increase independence;increase strength;increase balance;decrease pain;improve skin integrity;decrease risk of DVT;increase knowledge  -AS        Barriers to Rehab  hearing deficit  -AS           Living  Environment    Current Living Arrangements  home/apartment/condo  -AS        Lives With  alone  -AS           Stairs Within Home, Primary    Number of Stairs, Within Home, Primary  three  -AS        Stair Railings, Within Home, Primary  railing on left side (ascending)  -AS           Sensory    Additional Documentation  Sensory Assessment (Somatosensory) (Group)  -AS           Sensory Assessment (Somatosensory)    Sensory Assessment (Somatosensory)  UE sensation intact  -AS           Cognition    Affect/Mental Status (Cognitive)  WFL  -AS        Orientation Status (Cognition)  oriented x 4  -AS           Pain Assessment    Additional Documentation  Pain Scale: Numbers Pre/Post-Treatment (Group)  -AS           Pain Scale: Numbers Pre/Post-Treatment    Pretreatment Pain Rating  0/10 - no pain  -AS        Posttreatment Pain Rating  0/10 - no pain  -AS        Pre/Posttreatment Pain Comment  reports significant  pain in mid back during ambulation/OOB activity  -AS           Range of Motion Comprehensive    General Range of Motion  other (see comments) appears WFL; not formally tested 2/2 pain in ribs  -AS           Strength (Manual Muscle Testing)    Strength (Manual Muscle Testing)  other (see comments);strength is WFL appears WFL; not formally tested 2/2 pain in ribs  -AS           Bed Mobility    Bed Mobility  sit-supine;supine-sit;scooting/bridging  -AS        Scooting/Bridging [place] (Bed Mobility)  maximum assist (25% patient effort);2 person assist  -AS        Supine-Sit [place] (Bed Mobility)  minimum assist (75% patient effort);2 person assist  -AS        Sit-Supine [place] (Bed Mobility)  maximum assist (25% patient effort);2 person assist  -AS        Assistive Device (Bed Mobility)  bed rails;draw sheet;head of bed elevated  -AS           Functional Mobility    Functional Mobility- Ind. Level  minimum assist (75% patient effort)  -AS        Functional Mobility- Device  rolling walker  -AS         Functional Mobility- Comment  amb to/from bathroom  -AS           Transfer Assessment/Treatment    Transfers  sit-stand transfer;stand-sit transfer;toilet transfer  -AS        Comment (Transfers)  attempted toilet t/f, however once at toilet pt unable to lower self down to commode 2/2 pain  -AS           Transfers    Sit-Stand [place] (Transfers)  minimum assist (75% patient effort);2 person assist  -AS        Stand-Sit [place] (Transfers)  minimum assist (75% patient effort)  -AS           Sit-Stand Transfer    Assistive Device (Sit-Stand Transfers)  walker, front-wheeled  -AS           Stand-Sit Transfer    Assistive Device (Stand-Sit Transfers)  walker, front-wheeled  -AS           Safety Issues, Functional Mobility    Impairments Affecting Function (Mobility)  pain;endurance/activity tolerance;shortness of breath;strength  -AS           Activities of Daily Living    BADL Assessment/Intervention  lower body dressing  -AS           Lower Body Dressing Assessment/Training    [place] Level (Lower Body Dressing)  don;pants/bottoms;maximum assist (25% patient effort)  -AS        Position (Lower Body Dressing)  edge of bed sitting;supine  -AS        Comment (Lower Body Dressing)  required assist 2/2 increased pain in back  -AS           BADL Safety/Performance    Impairments, BADL Safety/Performance  balance;endurance/activity tolerance;pain  -AS           Plan of Care Review    Plan of Care Reviewed With  patient;daughter  -AS           Vital Signs    Pre Systolic BP Rehab  145  -AS        Pre Treatment Diastolic BP  65  -AS        Post Systolic BP Rehab  137  -AS        Post Treatment Diastolic BP  60  -AS        Pretreatment Heart Rate (beats/min)  77  -AS        Posttreatment Heart Rate (beats/min)  70  -AS        Post SpO2 (%)  92  -AS        O2 Delivery Post Treatment  room air  -AS        Pre Patient Position  Supine  -AS        Post Patient Position  Supine  -AS           OT Goals    Transfer  Goal Selection (OT)  transfer, OT goal 1  -AS        Bathing Goal Selection (OT)  bathing, OT goal 1  -AS        Dressing Goal Selection (OT)  dressing, OT goal 1  -AS        Toileting Goal Selection (OT)  toileting, OT goal 1  -AS        Activity Tolerance Goal Selection (OT)  activity tolerance, OT goal 1  -AS           Transfer Goal 1 (OT)    Activity/Assistive Device (Transfer Goal 1, OT)  sit-to-stand/stand-to-sit;bed-to-chair/chair-to-bed;toilet  -AS        [place] Level/Cues Needed (Transfer Goal 1, OT)  minimum assist (75% or more patient effort)  -AS        Time Frame (Transfer Goal 1, OT)  long term goal (LTG);by discharge  -AS        Progress/Outcome (Transfer Goal 1, OT)  goal not met  -AS           Bathing Goal 1 (OT)    Activity/Device (Bathing Goal 1, OT)  bathing skills, all;long-handled sponge  -AS        [place] Level/Cues Needed (Bathing Goal 1, OT)  minimum assist (75% or more patient effort)  -AS        Time Frame (Bathing Goal 1, OT)  long term goal (LTG);by discharge  -AS        Progress/Outcomes (Bathing Goal 1, OT)  goal not met  -AS           Dressing Goal 1 (OT)    Activity/Device (Dressing Goal 1, OT)  lower body dressing;other (see comments) using AE PRN  -AS        [place]/Cues Needed (Dressing Goal 1, OT)  minimum assist (75% or more patient effort)  -AS        Time Frame (Dressing Goal 1, OT)  long term goal (LTG);by discharge  -AS        Progress/Outcome (Dressing Goal 1, OT)  goal not met  -AS           Toileting Goal 1 (OT)    Activity/Device (Toileting Goal 1, OT)  toileting skills, all  -AS        [place] Level/Cues Needed (Toileting Goal 1, OT)  minimum assist (75% or more patient effort)  -AS        Time Frame (Toileting Goal 1, OT)  long term goal (LTG);by discharge  -AS        Progress/Outcome (Toileting Goal 1, OT)  goal not met  -AS            Activity Tolerance Goal 1 (OT)    Activity Level (Endurance Goal 1, OT)  15 min activity functional/therapeutic  activities  -AS        Time Frame (Activity Tolerance Goal 1, OT)  long term goal (LTG);by discharge  -AS        Progress/Outcome (Activity Tolerance Goal 1, OT)  goal not met  -AS           Positioning and Restraints    Pre-Treatment Position  in bed  -AS        Post Treatment Position  bed  -AS        In Bed  supine;call light within reach;encouraged to call for assist;exit alarm on;with family/caregiver  -AS           Therapy Assessment/Plan (OT)    Date of Referral to OT  10/18/20  -AS        Patient/Family Therapy Goal Statement (OT)  return home  -AS        OT Diagnosis  impaired mobility and ADLs  -AS        Rehab Potential (OT)  fair, will monitor progress closely  -AS        Criteria for Skilled Therapeutic Interventions Met (OT)  yes;skilled treatment is necessary  -AS        Therapy Frequency (OT)  other (see comments) 5-7days/wk  -AS        Predicted Duration of Therapy Intervention (OT)  until goals met or d/c from facility  -AS        Problem List (OT)  problems related to;balance;pain;other (see comments) activity tolerance  -AS        Activity Limitations Related to Problem List (OT)  unable to ambulate safely;unable to transfer safely;BADLs not performed adequately or safely  -AS        Planned Therapy Interventions (OT)  activity tolerance training;adaptive equipment training;BADL retraining;functional balance retraining;occupation/activity based interventions;patient/caregiver education/training;ROM/therapeutic exercise;strengthening exercise;transfer/mobility retraining  -AS           Therapy Plan Review/Discharge Plan (OT)    Therapy Plan Review (OT)  evaluation/treatment results reviewed;care plan/treatment goals reviewed;risks/benefits reviewed;current/potential barriers reviewed;participants voiced agreement with care plan;participants included;patient  -AS        Anticipated Discharge Disposition (OT)  home with /7 care;home with home health;other (see comments) per family request  -AS         Patient/Family Concerns, Anticipated Discharge Disposition (OT)  pt ht edu how to safely assist pt with bed mobility, transfers, mobility, ADLs; edu jc/doffing gait belt  -AS          User Key  (r) = Recorded By, (t) = Taken By, (c) = Cosigned By    Initials Name Effective Dates    AS Glendy Camejo OT 07/05/20 -          Occupational Therapy Education                 Title: PT OT SLP Therapies (In Progress)     Topic: Occupational Therapy (In Progress)     Point: ADL training (Done)     Description:   Instruct learner(s) on proper safety adaptation and remediation techniques during self care or transfers.   Instruct in proper use of assistive devices.              Learning Progress Summary           Patient Acceptance, E, VU by AS at 10/18/2020 1331    Comment: role of OT, OT POC, ADL training, transfer training, proper donning/doffing gait belt; caregiver training for safely assisting with ADLs and fxnl t/fs   Family Acceptance, E, VU by AS at 10/18/2020 1331    Comment: role of OT, OT POC, ADL training, transfer training, proper donning/doffing gait belt; caregiver training for safely assisting with ADLs and fxnl t/fs                   Point: Home exercise program (Not Started)     Description:   Instruct learner(s) on appropriate technique for monitoring, assisting and/or progressing therapeutic exercises/activities.              Learner Progress:  Not documented in this visit.          Point: Precautions (Done)     Description:   Instruct learner(s) on prescribed precautions during self-care and functional transfers.              Learning Progress Summary           Patient Acceptance, E, VU by AS at 10/18/2020 1331    Comment: role of OT, OT POC, ADL training, transfer training, proper donning/doffing gait belt; caregiver training for safely assisting with ADLs and fxnl t/fs   Family Acceptance, E, VU by AS at 10/18/2020 1331    Comment: role of OT, OT POC, ADL training, transfer training, proper  donning/doffing gait belt; caregiver training for safely assisting with ADLs and fxnl t/fs                   Point: Body mechanics (Not Started)     Description:   Instruct learner(s) on proper positioning and spine alignment during self-care, functional mobility activities and/or exercises.              Learner Progress:  Not documented in this visit.                      User Key     Initials Effective Dates Name Provider Type Discipline    AS 07/05/20 -  Glendy Camejo OT Occupational Therapist OT                  OT Recommendation and Plan  Planned Therapy Interventions (OT): activity tolerance training, adaptive equipment training, BADL retraining, functional balance retraining, occupation/activity based interventions, patient/caregiver education/training, ROM/therapeutic exercise, strengthening exercise, transfer/mobility retraining  Therapy Frequency (OT): other (see comments)(5-7days/wk)  Plan of Care Review  Plan of Care Reviewed With: patient, daughter  Outcome Summary: OT eval completed; co-eval with PT. Pt agreeable to therapies, however largely limited by pain. Pt performed supine<>sit with max A of 2. While seated EOB, pt required max A for LBD. Pt performed sit<>Stand with min A of 2 using RW. Pt amb to bathroom using RW with increased time and min A of 1. Pt unable to complete toilet transfer as she was unable to lower herself to sitting on commode due to increased pain. Pt dght observed entirity of session and reports plans for pt to return home with 24/7 assist from family. Pt dght participated in caregiver training of safely assisting pt with ADLs and t/fs. Pt dght also edu on purpose and use of gait belt. Rec home with 24/7 assist and HH OT at d/c. Cont OT POC to progress to highest level of independence.  Plan of Care Reviewed With: patient, daughter  Outcome Summary: OT eval completed; co-eval with PT. Pt agreeable to therapies, however largely limited by pain. Pt performed supine<>sit with  max A of 2. While seated EOB, pt required max A for LBD. Pt performed sit<>Stand with min A of 2 using RW. Pt amb to bathroom using RW with increased time and min A of 1. Pt unable to complete toilet transfer as she was unable to lower herself to sitting on commode due to increased pain. Pt dght observed entirity of session and reports plans for pt to return home with 24/7 assist from family. Pt dght participated in caregiver training of safely assisting pt with ADLs and t/fs. Pt dght also edu on purpose and use of gait belt. Rec home with 24/7 assist and HH OT at d/c. Cont OT POC to progress to highest level of independence.    Outcome Measures     Row Name 10/18/20 1055             How much help from another is currently needed...    Putting on and taking off regular lower body clothing?  2  -AS      Bathing (including washing, rinsing, and drying)  2  -AS      Toileting (which includes using toilet bed pan or urinal)  2  -AS      Putting on and taking off regular upper body clothing  2  -AS      Taking care of personal grooming (such as brushing teeth)  3  -AS      Eating meals  3  -AS      AM-PAC 6 Clicks Score (OT)  14  -AS         Functional Assessment    Outcome Measure Options  AM-PAC 6 Clicks Daily Activity (OT)  -AS        User Key  (r) = Recorded By, (t) = Taken By, (c) = Cosigned By    Initials Name Provider Type    AS Glnedy Camejo OT Occupational Therapist          Time Calculation:   Time Calculation- OT     Row Name 10/18/20 1337             Time Calculation- OT    OT Start Time  1057  -AS      OT Stop Time  1151  -AS      OT Time Calculation (min)  54 min  -AS      OT Received On  10/18/20  -AS      OT Goal Re-Cert Due Date  10/31/20  -AS        User Key  (r) = Recorded By, (t) = Taken By, (c) = Cosigned By    Initials Name Provider Type    AS Glendy Camejo OT Occupational Therapist        Therapy Charges for Today     Code Description Service Date Service Provider Modifiers Qty     [id number]  OT EVAL MOD COMPLEXITY 4 10/18/2020 Glendy Camejo, OT GO 1               Glendy Camejo, CAROL  10/18/2020

## 2020-10-18 NOTE — ED NOTES
asked me to d/w pt's daughter dc options. ER CM visit with pt's daughter to answer questions and discuss temporary nhp, assisted living, private sitters etc. oncc pt is medically stable for d/c . Pt to stay in OBV due to severe pain after a fall today at home. Pt is having a lot of pain with rib fractures. Pt lives alone and is independent. Pt has a walker. Pt has 3 daughters and this daughter lives behind her . They are not interested in NHP at all. Daughter wants pt's pain under control and HH and DME at d/c if needed and instructions on how to take care of her mother without causing further harm. She said her mother is very tough and that's how she knows she is in a lot of pain with this injury today. I d/w her family assisting pt once home and hiring private pay sitters if they so desired. All options discussed and poc/dcpoc agreed on by Dr. Harley and pt/daughter and myself. Daughter is aware pt will be obv and to anticipate d/c home tomorrow is stable. Pt does want to go home at d/c and doesn't want nhp. Daughter wasn't sure if they still had a bsc that was her dad's before he passed away. We discussed a hospital bed may be needed at d/c. I asked her which DME place and she said there was one in [place] where they live but she couldn't remember the name of it. I told her she didn't have to decide tonight. PT'S pcp is I[facility]. Daughter very stressed and worried about transporting pt home in private car at d/c due to pain, but said she was told with her dad in the past that medicare might not cover ambulance trip home. I d/w her to make sure pt has been medicated by nurse  to decrease her pain shortly before they put her in the car. No other needs noted at this time. I left a note in handoff for CM to assist at d/c. I also left a sticky note for MD asking for CM CONSULT FOR DCP.

## 2020-10-18 NOTE — THERAPY EVALUATION
Patient Name: Monserrat Downs  : 1930    MRN: [id number]                              Today's Date: 10/18/2020       Admit Date: 10/17/2020    Visit Dx:     ICD-10-CM ICD-9-CM   1. Fall, initial encounter  W19.XXXA E888.9   2. Closed fracture of multiple ribs of left side, initial encounter  S22.42XA 807.09   3. Impaired physical mobility  Z74.09 781.99     Patient Active Problem List   Diagnosis   • UIP (usual interstitial pneumonitis) (CMS/HCC)   • Chronic cough   • Constipation   • Chronic pain of both knees   • Generalized OA   • Hyperglycemia   • Hyperlipidemia   • B12 deficiency   • Primary osteoarthritis of both knees   • Radiculopathy of leg   • Trochanteric bursitis of right hip   • Chronic low back pain   • Scoliosis (and kyphoscoliosis), idiopathic   • Abscess   • Perirectal abscess   • Carotid artery stenosis   • Benign essential hypertension   • Interstitial pneumonitis (CMS/HCC)   • Environmental allergies   • Advanced age   • Mild intermittent asthma without complication   • Arthralgia   • Abnormal blood finding   • Fall     Past Medical History:   Diagnosis Date   • Abscess of buttock    • Anorectal abscess    • Benign essential hypertension    • Benign hypertension    • Carotid artery stenosis    • Cellulitis, abdominal wall    • Closed fracture of femur (CMS/HCC)    • COPD (chronic obstructive pulmonary disease) (CMS/HCC)    • Hip pain    • Hyperglycemia    • Hyperlipidemia    • Influenza vaccine needed    • Osteoarthritis    • Primary generalized (osteo)arthritis    • S/P breast biopsy, right      Past Surgical History:   Procedure Laterality Date   • INJECTION OF MEDICATION  2016    B12 (3)   • INJECTION OF MEDICATION  2016    Depo Medrol (Methylprednisone) (1)   • INJECTION OF MEDICATION  10/27/2015    Depo Medrol (Methylprednisone) 80mg (8)   • INJECTION OF MEDICATION  10/03/2012    Rocephin (1)   • INJECTION OF MEDICATION  2015    Toradol (6)   • INTRAMEDULLARY  NAILING/RODDING HUMERUS, TIBIA, FEMUR  03/14/2014    Orthotic procedure     General Information     Row Name 10/18/20 1057          Physical Therapy Time and Intention    Document Type  evaluation  -CZ     Mode of Treatment  co-treatment;occupational therapy;physical therapy  -CZ     Row Name 10/18/20 1057          General Information    Patient Profile Reviewed  yes  -CZ     Prior Level of Function  independent:;all household mobility  -CZ     Existing Precautions/Restrictions  fall  -CZ     Barriers to Rehab  hearing deficit  -CZ     Row Name 10/18/20 1057          Living Environment    Lives With  alone  -CZ     Row Name 10/18/20 1057          Stairs Within Home, Primary    Number of Stairs, Within Home, Primary  three  -CZ     Stair Railings, Within Home, Primary  railing on left side (ascending)  -CZ     Row Name 10/18/20 1057          Cognition    Orientation Status (Cognition)  oriented x 4  -CZ     Row Name 10/18/20 1057          Safety Issues, Functional Mobility    Impairments Affecting Function (Mobility)  pain;endurance/activity tolerance;shortness of breath;strength  -CZ     Comment, Safety Issues/Impairments (Mobility)  Family to stay with patient. Mod (I) with FWW, uses transport chair for appointments. Has walk-in bathtub. Has toilet riser with grab bars.  -CZ       User Key  (r) = Recorded By, (t) = Taken By, (c) = Cosigned By    Initials Name Provider Type    CZ Prem Doss, PT Physical Therapist        Mobility     Row Name 10/18/20 1057          Bed Mobility    Bed Mobility  sit-supine;supine-sit;scooting/bridging  -CZ     Scooting/Bridging [place] (Bed Mobility)  maximum assist (25% patient effort);2 person assist  -CZ     Supine-Sit [place] (Bed Mobility)  minimum assist (75% patient effort);2 person assist  -CZ     Sit-Supine [place] (Bed Mobility)  maximum assist (25% patient effort);2 person assist  -CZ     Assistive Device (Bed Mobility)  bed rails;draw sheet;head of bed  elevated  -CZ     Row Name 10/18/20 1057          Sit-Stand Transfer    Sit-Stand [place] (Transfers)  minimum assist (75% patient effort);2 person assist  -CZ     Assistive Device (Sit-Stand Transfers)  walker, front-wheeled  -CZ     Row Name 10/18/20 1057          Gait/Stairs (Locomotion)    [place] Level (Gait)  minimum assist (75% patient effort)  -CZ     Assistive Device (Gait)  walker, front-wheeled  -CZ     Distance in Feet (Gait)  15' x 2.  -CZ     Comment (Gait/Stairs)  Slow edil, poor use of walker; unable to sit on toilet, limited by pain.  -CZ       User Key  (r) = Recorded By, (t) = Taken By, (c) = Cosigned By    Initials Name Provider Type    CZ Prem Doss, PT Physical Therapist        Obj/Interventions     Row Name 10/18/20 1057          Range of Motion Comprehensive    General Range of Motion  bilateral lower extremity ROM WNL  -     Row Name 10/18/20 1057          Strength Comprehensive (MMT)    General Manual Muscle Testing (MMT) Assessment  other (see comments)  -CZ     Comment, General Manual Muscle Testing (MMT) Assessment  BLEs: 3/5 grossly.  -CZ     Row Name 10/18/20 1057          Sensory Assessment (Somatosensory)    Sensory Assessment (Somatosensory)  LE sensation intact  -CZ       User Key  (r) = Recorded By, (t) = Taken By, (c) = Cosigned By    Initials Name Provider Type    CZ Prem Doss, PT Physical Therapist        Goals/Plan     Row Name 10/18/20 1057          Bed Mobility Goal 1 (PT)    Activity/Assistive Device (Bed Mobility Goal 1, PT)  sit to supine/supine to sit  -CZ     [place] Level/Cues Needed (Bed Mobility Goal 1, PT)  supervision required  -     Time Frame (Bed Mobility Goal 1, PT)  long term goal (LTG);by discharge  -CZ     Strategies/Barriers (Bed Mobility Goal 1, PT)  L sided rib fractures.  -CZ     Progress/Outcomes (Bed Mobility Goal 1, PT)  goal not met  -CZ     Row Name 10/18/20 1057          Transfer Goal 1 (PT)    Activity/Assistive  Device (Transfer Goal 1, PT)  sit-to-stand/stand-to-sit;bed-to-chair/chair-to-bed;walker, rolling  -CZ     [place] Level/Cues Needed (Transfer Goal 1, PT)  contact guard assist  -CZ     Time Frame (Transfer Goal 1, PT)  long term goal (LTG);by discharge  -CZ     Strategies/Barriers (Transfers Goal 1, PT)  L sided rib fractures.  -CZ     Row Name 10/18/20 105          Gait Training Goal 1 (PT)    Activity/Assistive Device (Gait Training Goal 1, PT)  walker, rolling  -CZ     [place] Level (Gait Training Goal 1, PT)  contact guard assist  -CZ     Distance (Gait Training Goal 1, PT)  25'x1.  -CZ     Time Frame (Gait Training Goal 1, PT)  long term goal (LTG);by discharge  -CZ     Strategies/Barriers (Gait Training Goal 1, PT)  L sided rib fractures.  -CZ     Progress/Outcome (Gait Training Goal 1, PT)  goal not met  -CZ     Row Name 10/18/20 1055          Stairs Goal 1 (PT)    Activity/Assistive Device (Stairs Goal 1, PT)  using handrail, left  -CZ     [place] Level/Cues Needed (Stairs Goal 1, PT)  minimum assist (75% or more patient effort)  -CZ     Number of Stairs (Stairs Goal 1, PT)  3 steps.  -CZ     Time Frame (Stairs Goal 1, PT)  long term goal (LTG);by discharge  -CZ     Strategies/Barriers (Stairs Goal 1, PT)  L sided rib fractures.  -CZ     Progress/Outcome (Stairs Goal 1, PT)  goal not met  -CZ       User Key  (r) = Recorded By, (t) = Taken By, (c) = Cosigned By    Initials Name Provider Type    CZ Prem Doss, PT Physical Therapist        Clinical Impression     Row Name 10/18/20 7485          Pain    Additional Documentation  Pain Scale: Numbers Pre/Post-Treatment (Group)  -CZ     Row Name 10/18/20 0775          Pain Scale: Numbers Pre/Post-Treatment    Pretreatment Pain Rating  0/10 - no pain  -CZ     Posttreatment Pain Rating  1/10  -CZ     Pre/Posttreatment Pain Comment  Reports L sided LBP, does not rate.  -CZ     Pain Intervention(s)  Medication (See  MAR);Repositioned;Ambulation/increased activity;Distraction  -CZ     Row Name 10/18/20 1057          Plan of Care Review    Plan of Care Reviewed With  patient;daughter  -CZ     Row Name 10/18/20 1057          Therapy Assessment/Plan (PT)    Rehab Potential (PT)  good, to achieve stated therapy goals  -CZ     Criteria for Skilled Interventions Met (PT)  yes;skilled treatment is necessary  -CZ     Predicted Duration of Therapy Intervention (PT)  Scheduled to discharge home today.  -CZ     Row Name 10/18/20 1057          Vital Signs    Pre Systolic BP Rehab  145  -CZ     Pre Treatment Diastolic BP  65  -CZ     Pretreatment Heart Rate (beats/min)  77  -CZ     Posttreatment Heart Rate (beats/min)  70  -CZ     O2 Delivery Pre Treatment  room air  -CZ     Post SpO2 (%)  92  -CZ     O2 Delivery Post Treatment  room air  -CZ     Pre Patient Position  Supine  -CZ     Post Patient Position  Supine  -CZ     Row Name 10/18/20 1057          Positioning and Restraints    Pre-Treatment Position  in bed  -CZ     Post Treatment Position  bed  -CZ     In Bed  supine;call light within reach;encouraged to call for assist;exit alarm on;with family/caregiver  -CZ       User Key  (r) = Recorded By, (t) = Taken By, (c) = Cosigned By    Initials Name Provider Type    CZ Prem Doss, PT Physical Therapist        Outcome Measures     Row Name 10/18/20 1057          How much help from another person do you currently need...    Turning from your back to your side while in flat bed without using bedrails?  2  -CZ     Moving from lying on back to sitting on the side of a flat bed without bedrails?  2  -CZ     Moving to and from a bed to a chair (including a wheelchair)?  3  -CZ     Standing up from a chair using your arms (e.g., wheelchair, bedside chair)?  3  -CZ     Climbing 3-5 steps with a railing?  3  -CZ     To walk in hospital room?  3  -CZ     AM-PAC 6 Clicks Score (PT)  16  -CZ     Row Name 10/18/20 1057          Functional  Assessment    Outcome Measure Options  AM-PAC 6 Clicks Basic Mobility (PT)  -       User Key  (r) = Recorded By, (t) = Taken By, (c) = Cosigned By    Initials Name Provider Type    CZ Prem Doss, PT Physical Therapist        Physical Therapy Education                 Title: PT OT SLP Therapies (In Progress)     Topic: Physical Therapy (In Progress)     Point: Mobility training (Done)     Learning Progress Summary           Patient Acceptance, E, VU,NR by  at 10/18/2020 1242    Comment: Educated patient and daughter on proper use of gait belt, importance of mobility to avoid complications such as pneumonia, proper technique to ascend/descend stairs and enter/exit car.   Family Acceptance, E, VU,NR by  at 10/18/2020 1242    Comment: Educated patient and daughter on proper use of gait belt, importance of mobility to avoid complications such as pneumonia, proper technique to ascend/descend stairs and enter/exit car.                   Point: Home exercise program (Not Started)     Learner Progress:  Not documented in this visit.          Point: Body mechanics (Not Started)     Learner Progress:  Not documented in this visit.          Point: Precautions (Not Started)     Learner Progress:  Not documented in this visit.                      User Key     Initials Effective Dates Name Provider Type Discipline     04/03/18 -  Prem Doss, PT Physical Therapist PT              PT Recommendation and Plan  Planned Therapy Interventions (PT): balance training, bed mobility training, gait training, patient/family education, stair training, strengthening, stretching, transfer training  Plan of Care Reviewed With: patient, daughter  Outcome Summary: Initial PT evaluation complete; co-evaluation with OT.  Patient is alert, quite limited by pain. She requires max Ax1-2 with bed mobility, min Ax1-2 with transfers and gait, ambulating 15'x2 with FWW, slow edil, poor use of walker. Patient would benefit from rehab  prior to discharge home but is adamant she wishes to return home.  Patient daughter present during evaluation, reports family will be able to assist patient. Educated daughter on proper use of gait belt, proper technique with bed and car transfers as well as stair climbing.   Recommend HHPT and 24 hour assistance. Goals established, continue skilled PT.     Time Calculation:   PT Charges     Row Name 10/18/20 1250             Time Calculation    Start Time  1057  -CZ      Stop Time  1150  -CZ      Time Calculation (min)  53 min  -CZ      PT Received On  10/18/20  -CZ      PT Goal Re-Cert Due Date  10/31/20  -CZ        User Key  (r) = Recorded By, (t) = Taken By, (c) = Cosigned By    Initials Name Provider Type    CZ Prem Doss, PT Physical Therapist        Therapy Charges for Today     Code Description Service Date Service Provider Modifiers Qty    57645918405 HC PT EVAL MOD COMPLEXITY 4 10/18/2020 Prem Doss, PT GP 1          PT G-Codes  Outcome Measure Options: AM-PAC 6 Clicks Basic Mobility (PT)  AM-PAC 6 Clicks Score (PT): 16    Prem Doss PT  10/18/2020

## 2020-10-18 NOTE — DISCHARGE PLACEMENT REQUEST
"Adam Elam (89 y.o. Female)     Date of Birth Social Security Number Address Home Phone MRN    1930  [address] [place], [place] [id number] [phone] [id number]    Yazidism Marital Status          Voodoo        Admission Date Admission Type Admitting Provider Attending Provider Department, Room/Bed    10/17/20 Emergency Aj Munson MD Echendu, Anthony W, MD [address], [facility]/1    Discharge Date Discharge Disposition Discharge Destination         Home or Self Care              Attending Provider: Aj Munson MD    Allergies: No Known Allergies    Isolation: None   Infection: None   Code Status: No CPR    Ht: 162.6 cm (64\")   Wt: 44 kg (97 lb)    Admission Cmt: None   Principal Problem: None                Active Insurance as of 10/17/2020     Primary Coverage     Payor Plan Insurance Group Employer/Plan Group    UNITED HE[employer] MEDICARE REPLACEMENT UNITED [employer] MEDICARE REPLACEMENT [id number]     Payor Plan Address Payor Plan Phone Number Payor Plan Fax Number Effective Dates    [address] [address]   2016 - None Entered    [facility] [id number]       Subscriber Name Subscriber Birth Date Member ID       ADAM ELAM 1930 [id number]                 Emergency Contacts      (Rel.) Home Phone Work Phone Mobile Phone    Arelis Fried (Daughter) [phone] -- --    Birgit Angulo (Daughter) [phone] -- [phone]          [address] [address]  Phone:  [phone]  Fax:   Date: Oct 18, 2020      Ambulatory Referral to Home Health     Patient:  Adam Elam MRN:  [id number]   [address] [place] [id number] :  1930  SSN:    Phone: [phone] Sex:  F      INSURANCE PAYOR PLAN GROUP # SUBSCRIBER ID   Primary:    [employer] MEDICARE REPLACEMENT [id number]      Referring Provider Information:  AJ MUNSON" [facility] Phone: [phone] Fax:       Referral Information:   # Visits:  1 Referral Type: Home Health [42]   Urgency:  Routine Referral Reason: Specialty Services Required   Start Date: Oct 18, 2020 End Date:  To be determined by Insurer   Diagnosis: Fall, initial encounter (W19.XXXA [ICD-10-CM] E888.9 [ICD-9-CM])  Closed fracture of multiple ribs of left side, initial encounter (S22.42XA [ICD-10-CM] 807.09 [ICD-9-CM])      Refer to Dept:   Refer to Provider:   Refer to Facility:       Face to Face Visit Date: 10/18/2020  Follow-up provider for Plan of Care? I treated the patient in an acute care facility and will not continue treatment after discharge.  Follow-up provider: ARLENE BENJAMIN [[id number]]  Reason/Clinical Findings: Left-sided rib fractures  Describe mobility limitations that make leaving home difficult: Deconditioning  Nursing/Therapeutic Services Requested: Skilled Nursing  Nursing/Therapeutic Services Requested: Physical Therapy  Nursing/Therapeutic Services Requested: Occupational Therapy  Skilled nursing orders: Medication education  Skilled nursing orders: Neurovascular assessments  PT orders: Total joint pathway  PT orders: Therapeutic exercise  Occupational orders: Activities of daily living  Occupational orders: Home safety assessment  Frequency: 1 Week 1     This document serves as a request of services and does not constitute Insurance authorization or approval of services.  To determine eligibility, please contact the members Insurance carrier to verify and review coverage.     If you have medical questions regarding this request for services. Please contact [address] at [phone] during normal business hours.       Authorizing Provider:Lacho Munson MD  Authorizing Provider's NPI: [id number]  Order Entered By: Lacho Munson MD 10/18/2020 10:28 AM     Electronically signed by: Lacho Munson MD 10/18/2020 10:28 AM

## 2020-10-18 NOTE — PLAN OF CARE
Problem: Adult Inpatient Plan of Care  Goal: Plan of Care Review  Outcome: Ongoing, Progressing  Flowsheets  Taken 10/18/2020 1244  Plan of Care Reviewed With:   patient   daughter  Outcome Summary:  Initial PT evaluation complete; co-evaluation with OT.  Patient is alert, quite limited by pain. She requires max Ax1-2 with bed mobility, min Ax1-2 with transfers and gait, ambulating 15'x2 with FWW, slow edil, poor use of walker. Patient would benefit from rehab prior to discharge home but is adamant she wishes to return home.  Patient daughter present during evaluation, reports family will be able to assist patient. Educated daughter on proper use of gait belt, proper technique with bed and car transfers as well as stair climbing.   Recommend HHPT and 24 hour assistance. Goals established, continue skilled PT.  Taken 10/18/2020 1057  Plan of Care Reviewed With:   patient   daughter

## 2020-10-18 NOTE — PLAN OF CARE
Problem: Fall Injury Risk  Goal: Absence of Fall and Fall-Related Injury  Outcome: Ongoing, Progressing     Problem: Adult Inpatient Plan of Care  Goal: Plan of Care Review  Outcome: Ongoing, Progressing  Goal: Patient-Specific Goal (Individualized)  Outcome: Ongoing, Progressing  Goal: Absence of Hospital-Acquired Illness or Injury  Outcome: Ongoing, Progressing  Goal: Optimal Comfort and Wellbeing  Outcome: Ongoing, Progressing  Goal: Readiness for Transition of Care  Outcome: Ongoing, Progressing  Intervention: Mutually Develop Transition Plan  Recent Flowsheet Documentation  Taken 10/18/2020 0151 by Asa Gudino Jr. RN  Equipment Currently Used at Home: none  Taken 10/17/2020 1954 by Asa Gudino Jr. RN  Transportation Anticipated:   agency   family or friend will provide  Patient/Family Anticipated Services at Transition:   Patient/Family Anticipates Transition to: home with help/services  Taken 10/17/2020 1952 by Asa Gudino Jr. RN  Equipment Currently Used at Home:   walker, standard   wheelchair     Problem: Skin Injury Risk Increased  Goal: Skin Health and Integrity  Outcome: Ongoing, Progressing     Problem: Asthma Comorbidity  Goal: Maintenance of Asthma Control  Outcome: Ongoing, Progressing     Problem: COPD Comorbidity  Goal: Maintenance of COPD Symptom Control  Outcome: Ongoing, Progressing     Problem: Diabetes Comorbidity  Goal: Blood Glucose Level Within Desired Range  Outcome: Ongoing, Progressing     Problem: Heart Failure Comorbidity  Goal: Maintenance of Heart Failure Symptom Control  Outcome: Ongoing, Progressing     Problem: Hypertension Comorbidity  Goal: Blood Pressure in Desired Range  Outcome: Ongoing, Progressing     Problem: Obstructive Sleep Apnea Risk or Actual (Comorbidity Management)  Goal: Unobstructed Breathing During Sleep  Outcome: Ongoing, Progressing     Problem: Pain Chronic (Persistent) (Comorbidity Management)  Goal: Acceptable  Pain Control and Functional Ability  Outcome: Ongoing, Progressing  Intervention: Develop Pain Management Plan  Recent Flowsheet Documentation  Taken 10/17/2020 2053 by Asa Gudino Jr., RN  Pain Management Interventions: see MAR     Problem: Seizure Disorder Comorbidity  Goal: Maintenance of Seizure Control  Outcome: Ongoing, Progressing   Goal Outcome Evaluation:

## 2020-10-19 ENCOUNTER — READMISSION MANAGEMENT (OUTPATIENT)
Dept: CALL CENTER | Facility: HOSPITAL | Age: 85
End: 2020-10-19

## 2020-10-19 VITALS
HEART RATE: 78 BPM | OXYGEN SATURATION: 98 % | BODY MASS INDEX: 16.56 KG/M2 | HEIGHT: 64 IN | DIASTOLIC BLOOD PRESSURE: 63 MMHG | WEIGHT: 97 LBS | SYSTOLIC BLOOD PRESSURE: 134 MMHG | RESPIRATION RATE: 19 BRPM | TEMPERATURE: 97.6 F

## 2020-10-19 PROBLEM — S22.42XA CLOSED FRACTURE OF MULTIPLE RIBS OF LEFT SIDE: Status: ACTIVE | Noted: 2020-10-19

## 2020-10-19 PROCEDURE — 90686 IIV4 VACC NO PRSV 0.5 ML IM: CPT | Performed by: INTERNAL MEDICINE

## 2020-10-19 PROCEDURE — G0378 HOSPITAL OBSERVATION PER HR: HCPCS

## 2020-10-19 PROCEDURE — G0008 ADMIN INFLUENZA VIRUS VAC: HCPCS | Performed by: INTERNAL MEDICINE

## 2020-10-19 PROCEDURE — 97535 SELF CARE MNGMENT TRAINING: CPT

## 2020-10-19 PROCEDURE — 94799 UNLISTED PULMONARY SVC/PX: CPT

## 2020-10-19 PROCEDURE — 25010000002 INFLUENZA VAC SPLIT QUAD 0.5 ML SUSPENSION PREFILLED SYRINGE: Performed by: INTERNAL MEDICINE

## 2020-10-19 PROCEDURE — 97530 THERAPEUTIC ACTIVITIES: CPT

## 2020-10-19 RX ADMIN — PANTOPRAZOLE SODIUM 40 MG: 40 TABLET, DELAYED RELEASE ORAL at 03:50

## 2020-10-19 RX ADMIN — ASPIRIN 81 MG: 81 TABLET, COATED ORAL at 08:21

## 2020-10-19 RX ADMIN — HYDROCODONE BITARTRATE AND ACETAMINOPHEN 1 TABLET: 5; 325 TABLET ORAL at 03:50

## 2020-10-19 RX ADMIN — INFLUENZA VIRUS VACCINE 0.5 ML: 15; 15; 15; 15 SUSPENSION INTRAMUSCULAR at 11:46

## 2020-10-19 RX ADMIN — POLYETHYLENE GLYCOL 3350 17 G: 17 POWDER, FOR SOLUTION ORAL at 08:20

## 2020-10-19 RX ADMIN — SODIUM CHLORIDE, PRESERVATIVE FREE 10 ML: 5 INJECTION INTRAVENOUS at 08:21

## 2020-10-19 RX ADMIN — ATORVASTATIN CALCIUM 10 MG: 10 TABLET, FILM COATED ORAL at 08:21

## 2020-10-19 RX ADMIN — BUDESONIDE AND FORMOTEROL FUMARATE DIHYDRATE 2 PUFF: 160; 4.5 AEROSOL RESPIRATORY (INHALATION) at 09:59

## 2020-10-19 RX ADMIN — TRAMADOL HYDROCHLORIDE 50 MG: 50 TABLET, FILM COATED ORAL at 08:21

## 2020-10-19 NOTE — THERAPY TREATMENT NOTE
Acute Care - Occupational Therapy Treatment Note  [facility]     Patient Name: Monserrat Downs  : 1930  MRN: [id number]  Today's Date: 10/19/2020  Onset of Illness/Injury or Date of Surgery: 10/17/20  Date of Referral to OT: 10/18/20  Referring Physician: Dr. Munson    Admit Date: 10/17/2020       ICD-10-CM ICD-9-CM   1. Fall, initial encounter  W19.XXXA E888.9   2. Closed fracture of multiple ribs of left side, initial encounter  S22.42XA 807.09   3. Impaired physical mobility  Z74.09 781.99   4. Impaired mobility and ADLs  Z74.09 V49.89    Z78.9      Patient Active Problem List   Diagnosis   • UIP (usual interstitial pneumonitis) (CMS/HCC)   • Chronic cough   • Constipation   • Chronic pain of both knees   • Generalized OA   • Hyperglycemia   • Hyperlipidemia   • B12 deficiency   • Primary osteoarthritis of both knees   • Radiculopathy of leg   • Trochanteric bursitis of right hip   • Chronic low back pain   • Scoliosis (and kyphoscoliosis), idiopathic   • Abscess   • Perirectal abscess   • Carotid artery stenosis   • Benign essential hypertension   • Interstitial pneumonitis (CMS/HCC)   • Environmental allergies   • Advanced age   • Mild intermittent asthma without complication   • Arthralgia   • Abnormal blood finding   • Fall   • Closed fracture of multiple ribs of left side     Past Medical History:   Diagnosis Date   • Abscess of buttock    • Anorectal abscess    • Benign essential hypertension    • Benign hypertension    • Carotid artery stenosis    • Cellulitis, abdominal wall    • Closed fracture of femur (CMS/HCC)    • COPD (chronic obstructive pulmonary disease) (CMS/HCC)    • Hip pain    • Hyperglycemia    • Hyperlipidemia    • Influenza vaccine needed    • Osteoarthritis    • Primary generalized (osteo)arthritis    • S/P breast biopsy, right      Past Surgical History:   Procedure Laterality Date   • INJECTION OF MEDICATION  2016    B12 (3)   • INJECTION OF MEDICATION  2016     Depo Medrol (Methylprednisone) (1)   • INJECTION OF MEDICATION  10/27/2015    Depo Medrol (Methylprednisone) 80mg (8)   • INJECTION OF MEDICATION  10/03/2012    Rocephin (1)   • INJECTION OF MEDICATION  06/26/2015    Toradol (6)   • INTRAMEDULLARY NAILING/RODDING HUMERUS, TIBIA, FEMUR  03/14/2014    Orthotic procedure          OT ASSESSMENT FLOWSHEET (last 12 hours)      OT Evaluation and Treatment     Row Name 10/19/20 8334                   OT Time and Intention    Subjective Information  no complaints  -LW        Document Type  evaluation;therapy note (daily note)  -LW        Mode of Treatment  co-treatment;physical therapy  -LW        Total Minutes, Occupational Therapy  55  -LW        Patient Effort  adequate  -LW        Symptoms Noted During/After Treatment  other (see comments) Pt states her ribs only hurt when she is moving  -LW           General Information    Patient Profile Reviewed  yes  -LW        General Observations of Patient  pt, longsitting  -LW        Equipment Currently Used at Home  walker, rolling;other (see comments);raised toilet walk-in tub; transport chair  -LW        Existing Precautions/Restrictions  fall  -LW        Equipment Issued to Patient  gait belt  -LW        Barriers to Rehab  hearing deficit  -LW           Cognition    Affect/Mental Status (Cognitive)  WFL  -LW        Orientation Status (Cognition)  oriented x 4  -LW           Pain Scale: Numbers Pre/Post-Treatment    Pretreatment Pain Rating  0/10 - no pain Pain with movement only  -LW        Posttreatment Pain Rating  0/10 - no pain Pain with movement only  -LW        Pain Location  chest  -LW           Range of Motion Comprehensive    General Range of Motion  other (see comments) appears WFL; not formally tested 2/2 pain in ribs  -LW           Bed Mobility    Bed Mobility  supine-sit;scooting/bridging  -LW        Scooting/Bridging [place] (Bed Mobility)  moderate assist (50% patient effort);1 person assist  -LW         Supine-Sit [place] (Bed Mobility)  minimum assist (75% patient effort);2 person assist  -LW        Sit-Supine [place] (Bed Mobility)  moderate assist (50% patient effort);1 person to manage equipment  -LW        Assistive Device (Bed Mobility)  bed rails;draw sheet;head of bed elevated  -LW           Functional Mobility    Functional Mobility- Ind. Level  minimum assist (75% patient effort)  -LW        Functional Mobility- Device  rolling walker  -LW        Functional Mobility-Distance (Feet)  16 bed to  toilet to bed  -LW           Transfer Assessment/Treatment    Transfers  sit-stand transfer;stand-sit transfer;toilet transfer  -LW        Comment (Transfers)  Pt movet to on and off the toilet with min A  -LW           Transfers    Sit-Stand [place] (Transfers)  minimum assist (75% patient effort);2 person assist  -LW        Stand-Sit [place] (Transfers)  minimum assist (75% patient effort)  -LW        [place] Level (Toilet Transfer)  minimum assist (75% patient effort)  -LW        Assistive Device (Toilet Transfer)  walker, front-wheeled  -LW           Sit-Stand Transfer    Assistive Device (Sit-Stand Transfers)  walker, front-wheeled  -LW           Stand-Sit Transfer    Assistive Device (Stand-Sit Transfers)  walker, front-wheeled  -LW           Toilet Transfer    Type (Toilet Transfer)  sit-stand;stand-sit  -LW           Safety Issues, Functional Mobility    Impairments Affecting Function (Mobility)  pain;endurance/activity tolerance;shortness of breath;strength  -LW           Activities of Daily Living    BADL Assessment/Intervention  grooming;toileting  -LW           Lower Body Dressing Assessment/Training    [place] Level (Lower Body Dressing)  don;pants/bottoms;maximum assist (25% patient effort)  -LW        Position (Lower Body Dressing)  edge of bed sitting;supine  -LW           Grooming Assessment/Training    [place] Level (Grooming)  grooming skills;hair care,  combing/brushing;oral care regimen;wash face, hands;supervision  -LW        Position (Grooming)  edge of bed sitting  -LW           Toileting Assessment/Training    [place] Level (Toileting)  toileting skills  -LW        Assistive Devices (Toileting)  commode;grab bar/safety frame;raised toilet seat  -LW           Coping    Observed Emotional State  calm;combative  -LW        Verbalized Emotional State  acceptance  -LW        Trust Relationship/Rapport  care explained;choices provided;empathic listening provided;questions answered;questions encouraged  -LW           Plan of Care Review    Plan of Care Reviewed With  patient  -LW        Progress  improving  -LW           Vital Signs    Pre Systolic BP Rehab  136  -LW        Pre Treatment Diastolic BP  66  -LW        Pretreatment Heart Rate (beats/min)  72  -LW        Posttreatment Heart Rate (beats/min)  73  -LW        O2 Delivery Pre Treatment  room air  -LW        Post SpO2 (%)  96  -LW        O2 Delivery Post Treatment  room air  -LW        Pre Patient Position  Sitting  -LW        Post Patient Position  Supine  -LW           OT Goals    Transfer Goal Selection (OT)  transfer, OT goal 1  -LW        Bathing Goal Selection (OT)  bathing, OT goal 1  -LW        Dressing Goal Selection (OT)  dressing, OT goal 1  -LW        Toileting Goal Selection (OT)  toileting, OT goal 1  -LW        Activity Tolerance Goal Selection (OT)  activity tolerance, OT goal 1  -LW           Transfer Goal 1 (OT)    Activity/Assistive Device (Transfer Goal 1, OT)  sit-to-stand/stand-to-sit;bed-to-chair/chair-to-bed;toilet  -LW        [place] Level/Cues Needed (Transfer Goal 1, OT)  minimum assist (75% or more patient effort)  -LW        Time Frame (Transfer Goal 1, OT)  long term goal (LTG);by discharge  -LW        Progress/Outcome (Transfer Goal 1, OT)  goal not met  -LW           Bathing Goal 1 (OT)    Activity/Device (Bathing Goal 1, OT)  bathing skills, all;long-handled sponge   -LW        [place] Level/Cues Needed (Bathing Goal 1, OT)  minimum assist (75% or more patient effort)  -LW        Time Frame (Bathing Goal 1, OT)  long term goal (LTG);by discharge  -LW        Progress/Outcomes (Bathing Goal 1, OT)  goal not met  -LW           Dressing Goal 1 (OT)    Activity/Device (Dressing Goal 1, OT)  lower body dressing;other (see comments) using AE PRN  -LW        [place]/Cues Needed (Dressing Goal 1, OT)  minimum assist (75% or more patient effort)  -LW        Time Frame (Dressing Goal 1, OT)  long term goal (LTG);by discharge  -LW        Progress/Outcome (Dressing Goal 1, OT)  goal not met  -LW           Toileting Goal 1 (OT)    Activity/Device (Toileting Goal 1, OT)  toileting skills, all  -LW        [place] Level/Cues Needed (Toileting Goal 1, OT)  minimum assist (75% or more patient effort)  -LW        Time Frame (Toileting Goal 1, OT)  long term goal (LTG);by discharge  -LW        Progress/Outcome (Toileting Goal 1, OT)  (S) goal met  -LW            Activity Tolerance Goal 1 (OT)    Activity Level (Endurance Goal 1, OT)  15 min activity functional/therapeutic activities  -LW        Progress/Outcome (Activity Tolerance Goal 1, OT)  (S) goal met  -LW           Positioning and Restraints    Pre-Treatment Position  in bed  -LW        Post Treatment Position  bed  -LW        In Bed  fowlers;call light within reach;encouraged to call for assist;exit alarm on  -LW           Therapy Assessment/Plan (OT)    Date of Referral to OT  10/18/20  -LW        Rehab Potential (OT)  fair, will monitor progress closely  -LW        Criteria for Skilled Therapeutic Interventions Met (OT)  yes;skilled treatment is necessary  -LW        Therapy Frequency (OT)  other (see comments) 5-7days/wk  -LW        Problem List (OT)  problems related to;balance;pain;other (see comments) activity tolerance  -LW        Activity Limitations Related to Problem List (OT)  unable to ambulate safely;unable to  transfer safely;BADLs not performed adequately or safely  -LW           Therapy Plan Review/Discharge Plan (OT)    Anticipated Discharge Disposition (OT)  home with 24/7 care;home with home health;other (see comments) per family request  -          User Key  (r) = Recorded By, (t) = Taken By, (c) = Cosigned By    Initials Name Effective Dates    NO Hanny Bhat, VILLATORO/BRIAN 03/07/18 -          Occupational Therapy Education                 Title: PT OT SLP Therapies (Resolved)     Topic: Occupational Therapy (Resolved)     Point: ADL training (Resolved)     Description:   Instruct learner(s) on proper safety adaptation and remediation techniques during self care or transfers.   Instruct in proper use of assistive devices.              Learning Progress Summary           Patient Acceptance, E, VU by AS at 10/18/2020 1331    Comment: role of OT, OT POC, ADL training, transfer training, proper donning/doffing gait belt; caregiver training for safely assisting with ADLs and fxnl t/fs   Family Acceptance, E, VU by AS at 10/18/2020 1331    Comment: role of OT, OT POC, ADL training, transfer training, proper donning/doffing gait belt; caregiver training for safely assisting with ADLs and fxnl t/fs                   Point: Home exercise program (Resolved)     Description:   Instruct learner(s) on appropriate technique for monitoring, assisting and/or progressing therapeutic exercises/activities.              Learner Progress:  Not documented in this visit.          Point: Precautions (Resolved)     Description:   Instruct learner(s) on prescribed precautions during self-care and functional transfers.              Learning Progress Summary           Patient Acceptance, E, VU by AS at 10/18/2020 1331    Comment: role of OT, OT POC, ADL training, transfer training, proper donning/doffing gait belt; caregiver training for safely assisting with ADLs and fxnl t/fs   Family Acceptance, E, VU by AS at 10/18/2020 1331    Comment:  role of OT, OT POC, ADL training, transfer training, proper donning/doffing gait belt; caregiver training for safely assisting with ADLs and fxnl t/fs                   Point: Body mechanics (Resolved)     Description:   Instruct learner(s) on proper positioning and spine alignment during self-care, functional mobility activities and/or exercises.              Learner Progress:  Not documented in this visit.                      User Key     Initials Effective Dates Name Provider Type Discipline    AS 07/05/20 -  Glendy Camejo OT Occupational Therapist OT                  OT Recommendation and Plan  Therapy Frequency (OT): other (see comments)(5-7days/wk)  Plan of Care Review  Plan of Care Reviewed With: patient  Progress: improving  Plan of Care Reviewed With: patient    Outcome Measures     Row Name 10/18/20 1058             How much help from another is currently needed...    Putting on and taking off regular lower body clothing?  2  -AS      Bathing (including washing, rinsing, and drying)  2  -AS      Toileting (which includes using toilet bed pan or urinal)  2  -AS      Putting on and taking off regular upper body clothing  2  -AS      Taking care of personal grooming (such as brushing teeth)  3  -AS      Eating meals  3  -AS      AM-PAC 6 Clicks Score (OT)  14  -AS         Functional Assessment    Outcome Measure Options  AM-PAC 6 Clicks Daily Activity (OT)  -AS        User Key  (r) = Recorded By, (t) = Taken By, (c) = Cosigned By    Initials Name Provider Type    AS Glendy Camejo OT Occupational Therapist          Time Calculation:   Time Calculation- OT     Row Name 10/19/20 1352             Time Calculation- OT    OT Start Time  0930  -LW      OT Stop Time  1025  -LW      OT Time Calculation (min)  55 min  -LW      Total Timed Code Minutes- OT  55 minute(s)  -LW      OT Received On  10/19/20  -LW        User Key  (r) = Recorded By, (t) = Taken By, (c) = Cosigned By    Initials Name Provider Type     LW Hanny Bhat COTA/L Occupational Therapy Assistant        Therapy Charges for Today     Code Description Service Date Service Provider Modifiers Qty    80611871214 HC OT SELF CARE/MGMT/TRAIN EA 15 MIN 10/19/2020 Hanny Bhat COTA/L GO 3    [id number] HC OT THERAPEUTIC ACT EA 15 MIN 10/19/2020 Hanny Bhat COTA/BRIAN GO 1               DOMITILA Tilley  10/19/2020

## 2020-10-19 NOTE — PLAN OF CARE
Problem: Fall Injury Risk  Goal: Absence of Fall and Fall-Related Injury  10/19/2020 1030 by Lai Garay RN  Outcome: Met  10/19/2020 1029 by Lai Garay RN  Outcome: Ongoing, Progressing  Intervention: Promote Injury-Free Environment  Recent Flowsheet Documentation  Taken 10/19/2020 1000 by Lai Garay RN  Safety Promotion/Fall Prevention:   safety round/check completed   fall prevention program maintained   nonskid shoes/slippers when out of bed  Taken 10/19/2020 0800 by Lai Garay RN  Safety Promotion/Fall Prevention:   fall prevention program maintained   nonskid shoes/slippers when out of bed   safety round/check completed     Problem: Adult Inpatient Plan of Care  Goal: Plan of Care Review  10/19/2020 1030 by Lai Garay RN  Outcome: Met  10/19/2020 1029 by Lai Garay RN  Outcome: Ongoing, Progressing  Flowsheets (Taken 10/19/2020 1029)  Progress: improving  Plan of Care Reviewed With: patient  Goal: Patient-Specific Goal (Individualized)  10/19/2020 1030 by Lai Garay RN  Outcome: Met  10/19/2020 1029 by Lai Garay RN  Outcome: Ongoing, Progressing  Goal: Absence of Hospital-Acquired Illness or Injury  10/19/2020 1030 by Lai Garay RN  Outcome: Met  10/19/2020 1029 by Lai Garay RN  Outcome: Ongoing, Progressing  Intervention: Identify and Manage Fall Risk  Recent Flowsheet Documentation  Taken 10/19/2020 1000 by Lai Garay RN  Safety Promotion/Fall Prevention:   safety round/check completed   fall prevention program maintained   nonskid shoes/slippers when out of bed  Taken 10/19/2020 0800 by Lai Garay RN  Safety Promotion/Fall Prevention:   fall prevention program maintained   nonskid shoes/slippers when out of bed   safety round/check completed  Intervention: Prevent Skin Injury  Recent Flowsheet Documentation  Taken 10/19/2020 0800 by Lai Garay RN  Body Position: sitting up in bed  Goal:  Optimal Comfort and Wellbeing  10/19/2020 1030 by Lai Garay RN  Outcome: Met  10/19/2020 1029 by Lai Garay RN  Outcome: Ongoing, Progressing  Intervention: Provide Person-Centered Care  Recent Flowsheet Documentation  Taken 10/19/2020 0820 by Lai Garay RN  Trust Relationship/Rapport:   care explained   choices provided   empathic listening provided   questions answered   questions encouraged   reassurance provided   thoughts/feelings acknowledged  Goal: Readiness for Transition of Care  10/19/2020 1030 by Lai Garay RN  Outcome: Met  10/19/2020 1029 by Lai Garay RN  Outcome: Ongoing, Progressing     Problem: Skin Injury Risk Increased  Goal: Skin Health and Integrity  10/19/2020 1030 by Lai Garay RN  Outcome: Met  10/19/2020 1029 by Lai Garay RN  Outcome: Ongoing, Progressing  Intervention: Optimize Skin Protection  Recent Flowsheet Documentation  Taken 10/19/2020 0820 by Lai Garay RN  Pressure Reduction Techniques: frequent weight shift encouraged  Pressure Reduction Devices: specialty bed utilized     Problem: Asthma Comorbidity  Goal: Maintenance of Asthma Control  10/19/2020 1030 by Lai Garay RN  Outcome: Met  10/19/2020 1029 by Lai Garay RN  Outcome: Ongoing, Progressing     Problem: COPD Comorbidity  Goal: Maintenance of COPD Symptom Control  10/19/2020 1030 by Lai Garay RN  Outcome: Met  10/19/2020 1029 by Lai Garay RN  Outcome: Ongoing, Progressing     Problem: Diabetes Comorbidity  Goal: Blood Glucose Level Within Desired Range  10/19/2020 1030 by Lai Garay RN  Outcome: Met  10/19/2020 1029 by Lai Garay RN  Outcome: Ongoing, Progressing     Problem: Heart Failure Comorbidity  Goal: Maintenance of Heart Failure Symptom Control  10/19/2020 1030 by Lai Garay RN  Outcome: Met  10/19/2020 1029 by Lai Garay RN  Outcome: Ongoing, Progressing     Problem:  Hypertension Comorbidity  Goal: Blood Pressure in Desired Range  10/19/2020 1030 by Lai Garay RN  Outcome: Met  10/19/2020 1029 by Lai Garay RN  Outcome: Ongoing, Progressing     Problem: Obstructive Sleep Apnea Risk or Actual (Comorbidity Management)  Goal: Unobstructed Breathing During Sleep  10/19/2020 1030 by Lai Garay RN  Outcome: Met  10/19/2020 1029 by Lai Garay RN  Outcome: Ongoing, Progressing     Problem: Pain Chronic (Persistent) (Comorbidity Management)  Goal: Acceptable Pain Control and Functional Ability  10/19/2020 1030 by Lai Garay RN  Outcome: Met  10/19/2020 1029 by Lai Garay RN  Outcome: Ongoing, Progressing  Intervention: Optimize Psychosocial Wellbeing  Recent Flowsheet Documentation  Taken 10/19/2020 0820 by Lai Garay RN  Supportive Measures:   active listening utilized   self-care encouraged  Diversional Activities: television     Problem: Seizure Disorder Comorbidity  Goal: Maintenance of Seizure Control  10/19/2020 1030 by Lai Garay RN  Outcome: Met  10/19/2020 1029 by Lai Garay RN  Outcome: Ongoing, Progressing   Goal Outcome Evaluation:  Plan of Care Reviewed With: patient  Progress: improving

## 2020-10-19 NOTE — OUTREACH NOTE
Prep Survey      Responses   [facility] facility patient discharged from?  [place]   Is LACE score < 7 ?  Yes   Eligibility  [facility]   Date of Admission  10/17/20   Date of Discharge  10/19/20   Discharge Disposition  Home-Health Care S[facility]   Discharge diagnosis  fall with multiple rib fractures   Does the patient have one of the following disease processes/diagnoses(primary or secondary)?  Other   Does the patient have Home health ordered?  Yes   What is the Home health agency?   [facility]   Is there a DME ordered?  No   Prep survey completed?  Yes          Smita Etienne RN

## 2020-10-20 ENCOUNTER — TRANSITIONAL CARE MANAGEMENT TELEPHONE ENCOUNTER (OUTPATIENT)
Dept: CALL CENTER | Facility: HOSPITAL | Age: 85
End: 2020-10-20

## 2020-10-20 NOTE — OUTREACH NOTE
Call Center TCM Note      Responses   [facility] patient discharged from?  [facility]   Does the patient have one of the following disease processes/diagnoses(primary or secondary)?  Other   TCM attempt successful?  Yes   Call start time  0937   Call end time  0948   Discharge diagnosis  fall with multiple rib fractures   Is patient permission given to speak with other caregiver?  Yes   Person spoke with today (if not patient) and relationship  daughter Arelis Borges reviewed with patient/caregiver?  Yes   Is the patient having any side effects they believe may be caused by any medication additions or changes?  No   Does the patient have all medications ordered at discharge?  Yes   Is the patient taking all medications as directed (includes completed medication regime)?  Yes   Does the patient have a primary care provider?   Yes   Does the patient have an appointment with their PCP within 7 days of discharge?  Yes   Comments regarding PCP  Ivon Evans/PCP has an appt on 10/22/2020   Has the patient kept scheduled appointments due by today?  N/A   What is the Home health agency?   [facility] [place]   Has home health visited the patient within 72 hours of discharge?  Yes   Psychosocial issues?  No   Comments  Patient still has some pain but doing ok per daughter report.    Did the patient receive a copy of their discharge instructions?  Yes   Nursing interventions  Reviewed instructions with patient   What is the patient's perception of their health status since discharge?  Improving   Is the patient/caregiver able to teach back signs and symptoms related to disease process for when to call PCP?  Yes   Is the patient/caregiver able to teach back signs and symptoms related to disease process for when to call 911?  Yes   Is the patient/caregiver able to teach back the hierarchy of who to call/visit for symptoms/problems? PCP, Specialist, Home health nurse, Urgent Care, ED, 911  Yes   If the patient is a current smoker, are  they able to teach back resources for cessation?  Not a smoker   TCM call completed?  Yes          Herman Bird RN    10/20/2020, 09:48 EDT

## 2020-10-21 RX ORDER — LIDOCAINE 50 MG/G
1 PATCH TOPICAL EVERY 24 HOURS
Qty: 30 EACH | Refills: 2 | Status: SHIPPED | OUTPATIENT
Start: 2020-10-21 | End: 2020-11-09

## 2020-10-22 ENCOUNTER — PRIOR AUTHORIZATION (OUTPATIENT)
Dept: FAMILY MEDICINE CLINIC | Facility: CLINIC | Age: 85
End: 2020-10-22

## 2020-10-22 ENCOUNTER — OFFICE VISIT (OUTPATIENT)
Dept: FAMILY MEDICINE CLINIC | Facility: CLINIC | Age: 85
End: 2020-10-22

## 2020-10-22 DIAGNOSIS — Z09 HOSPITAL DISCHARGE FOLLOW-UP: Primary | ICD-10-CM

## 2020-10-22 DIAGNOSIS — S22.42XS: ICD-10-CM
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
## 2020-10-22 PROCEDURE — 99443 PR PHYS/QHP TELEPHONE EVALUATION 21-30 MIN: CPT | Performed by: NURSE PRACTITIONER

## 2020-10-22 NOTE — PROGRESS NOTES
Subjective   Monserrat Downs is a 89 y.o. female. Patient here today with complaints of Fall  Patient here today for telehealth visit for hospital follow-up, relates that she fell last week, fractured 2 ribs is having back pain and rib pain still.  She was released to go home with home health and sitters and family members caring for her.  She is taking Norco 5 mg however pain is not controlled but she is only taking this about once a day.  Also was given Lidoderm patches which family intends on picking up today according to patient.  She is concerned about pain pills causing increased constipation which she already has a problem with and she is taking 3 stool softeners regularly already.  She relates that when she was walking in her [place] on [address] she fell and hit the left side of her back and side on her brick fireplace.  Was admitted for 2 days, discharged on the 19th.    There were no vitals filed for this visit.  There is no height or weight on file to calculate BMI.  Past Medical History:   Diagnosis Date   • Abscess of buttock    • Anorectal abscess    • Benign essential hypertension    • Benign hypertension    • Carotid artery stenosis    • Cellulitis, abdominal wall    • Closed fracture of femur (CMS/HCC)    • COPD (chronic obstructive pulmonary disease) (CMS/HCC)    • Hip pain    • Hyperglycemia    • Hyperlipidemia    • Influenza vaccine needed    • Osteoarthritis    • Primary generalized (osteo)arthritis    • S/P breast biopsy, right      Fall  The accident occurred 3 to 5 days ago. The fall occurred while walking. Impact surface: brick fireplace  The pain is present in the back (L ribs). The pain is severe. The symptoms are aggravated by pressure on injury and movement. She has tried rest and acetaminophen (norco ) for the symptoms. The treatment provided mild relief.   Pain  This is a new problem. The current episode started in the past 7 days. The problem occurs constantly. The problem has been  unchanged. Associated symptoms include chest pain. Exacerbated by: movement. She has tried rest, sleep, relaxation and oral narcotics for the symptoms. The treatment provided mild relief.        The following portions of the patient's history were reviewed and updated as appropriate: allergies, current medications, past family history, past medical history, past social history, past surgical history and problem list.    Review of Systems   Constitutional: Negative.    HENT: Negative.    Eyes: Negative.    Respiratory: Negative.    Cardiovascular: Positive for chest pain.   Gastrointestinal: Negative.    Endocrine: Negative.    Genitourinary: Negative.    Musculoskeletal: Positive for back pain.   Skin: Negative.    Allergic/Immunologic: Negative.    Neurological: Negative.    Hematological: Negative.    Psychiatric/Behavioral: Negative.        Objective   Physical Exam  Deferred, telephone visit   Assessment/Plan   Diagnoses and all orders for this visit:    1. Hospital discharge follow-up (Primary)    2. Fall, subsequent encounter    3. Fracture of two ribs, left, sequela    You have chosen to receive care through a telephone visit. Do you consent to use a telephone visit for your medical care today? Yes  This visit has been rescheduled as a phone visit to comply with patient safety concerns in accordance with CDC recommendations. Total time of discussion was 20 minutes.  She is advised that she can increase Norco taking more than 1/2-1 p.o. daily for pain.  Will obtain the Lidoderm patches as since she came from the pharmacy and use those as well.  Follow-up here in 3 months for recheck or sooner if needed.  Certainly needs to return sooner if pain not controlled.  Advised to continue with stool softeners daily.  We will continue with home health and home physical therapy for strengthening.  States that she has all DME equipment needed at present.  All questions and concerns are addressed with understanding  verbalized.  She is aware and is in agreement to above plan.

## 2020-10-22 NOTE — TELEPHONE ENCOUNTER
ADAM ELMA Key: [id number] - [place] Case ID: [id number] - Rx #: [id number]  Outcome  Approve dtoday  CaseId:[id number];Status:Approved;Review Type:Prior Auth;Coverage Start Date:09/22/2020;Coverage End Date:10/22/2021;  Drug  Lidocaine 5% patches

## 2020-10-27 ENCOUNTER — TELEPHONE (OUTPATIENT)
Dept: PULMONOLOGY | Facility: CLINIC | Age: 85
End: 2020-10-27

## 2020-10-27 NOTE — TELEPHONE ENCOUNTER
Pt has fallen and broken several ribs and wants to know if her appt on Friday 10/30/2020 can be a telephone visit?  Her dtr said if not then they will need to reschedule, it is too painful to get pt out

## 2020-10-28 ENCOUNTER — TELEPHONE (OUTPATIENT)
Dept: PULMONOLOGY | Facility: CLINIC | Age: 85
End: 2020-10-28

## 2020-10-28 NOTE — TELEPHONE ENCOUNTER
Pt is wanting to do a telephone visit due to multiple broken ribs.  Let me know what Dr. Sparks says.     Thanks  Bryon

## 2020-11-03 ENCOUNTER — APPOINTMENT (OUTPATIENT)
Dept: ONCOLOGY | Facility: CLINIC | Age: 85
End: 2020-11-03

## 2020-11-03 ENCOUNTER — APPOINTMENT (OUTPATIENT)
Dept: ONCOLOGY | Facility: HOSPITAL | Age: 85
End: 2020-11-03

## 2020-11-03 ENCOUNTER — OFFICE VISIT (OUTPATIENT)
Dept: FAMILY MEDICINE CLINIC | Facility: CLINIC | Age: 85
End: 2020-11-03
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
## 2020-11-03 DIAGNOSIS — S22.42XS: ICD-10-CM

## 2020-11-03 PROCEDURE — 99443 PR PHYS/QHP TELEPHONE EVALUATION 21-30 MIN: CPT | Performed by: NURSE PRACTITIONER

## 2020-11-03 RX ORDER — HYDROCODONE BITARTRATE AND ACETAMINOPHEN 5; 325 MG/1; MG/1
1 TABLET ORAL EVERY 6 HOURS PRN
COMMUNITY
End: 2020-11-03 | Stop reason: SDUPTHER

## 2020-11-03 RX ORDER — HYDROCODONE BITARTRATE AND ACETAMINOPHEN 5; 325 MG/1; MG/1
1 TABLET ORAL EVERY 6 HOURS PRN
Qty: 40 TABLET | Refills: 0 | Status: SHIPPED | OUTPATIENT
Start: 2020-11-03 | End: 2020-12-14 | Stop reason: SDUPTHER

## 2020-11-03 NOTE — PROGRESS NOTES
Subjective   Monserrat Downs is a 89 y.o. female. Patient here today with complaints of Rib Injury  Patient here today for telehealth visit complaining still of pain to left back and side with recent history of rib fractures after a fall.  She states that she was given 12 Norco 5 mg at discharge from hospital and she has run out of these.  She does report they help the pain more than Ultram and more than Tylenol.  She took Tylenol before physical therapy came to her house today and that helped minimally.  She reports physical therapy per home health coming out to assist her and teach her ways of getting around in her house and she states they may discharge her next week.  She does have a history of constipation and is advised that Norco may increase this, she is continuing to take 3 stool softeners daily without any increased complaints currently.    There were no vitals filed for this visit.  There is no height or weight on file to calculate BMI.  Past Medical History:   Diagnosis Date   • Abscess of buttock    • Anorectal abscess    • Benign essential hypertension    • Benign hypertension    • Carotid artery stenosis    • Cellulitis, abdominal wall    • Closed fracture of femur (CMS/HCC)    • COPD (chronic obstructive pulmonary disease) (CMS/HCC)    • Hip pain    • Hyperglycemia    • Hyperlipidemia    • Influenza vaccine needed    • Osteoarthritis    • Primary generalized (osteo)arthritis    • S/P breast biopsy, right      Rib Injury  This is a new problem. The current episode started 1 to 4 weeks ago. The problem occurs constantly. The problem has been unchanged. Associated symptoms include arthralgias. The symptoms are aggravated by walking, twisting, standing, sneezing, bending and coughing. She has tried sleep, rest, relaxation, position changes, oral narcotics, lying down and acetaminophen for the symptoms. The treatment provided mild relief.        The following portions of the patient's history were reviewed  and updated as appropriate: allergies, current medications, past family history, past medical history, past social history, past surgical history and problem list.    Review of Systems   Constitutional: Positive for activity change.   HENT: Negative.    Eyes: Negative.    Respiratory: Negative.    Cardiovascular: Negative.    Gastrointestinal: Positive for constipation.   Endocrine: Negative.    Genitourinary: Negative.    Musculoskeletal: Positive for arthralgias, back pain and gait problem.   Skin: Negative.    Allergic/Immunologic: Negative.    Hematological: Negative.    Psychiatric/Behavioral: Negative.        Objective   Physical Exam  Deferred, telephone visit   Assessment/Plan   Diagnoses and all orders for this visit:    1. Fall, subsequent encounter (Primary)  -     HYDROcodone-acetaminophen (NORCO) 5-325 MG per tablet; Take 1 tablet by mouth Every 6 (Six) Hours As Needed for Moderate Pain .  Dispense: 40 tablet; Refill: 0    2. Fracture of two ribs, left, sequela  -     HYDROcodone-acetaminophen (NORCO) 5-325 MG per tablet; Take 1 tablet by mouth Every 6 (Six) Hours As Needed for Moderate Pain .  Dispense: 40 tablet; Refill: 0    daren is obtained and reviewed  Ultram was DC'd and she is given refills on hydrocodone as above  Advised once the acute pain improves need to consider stopping Norco and changing to other form of pain medication if needed at that time  Patient aware and in agreement to this plan  We will follow up as needed problems or as scheduled for chronic conditions  Patient aware and in agreement to this plan  Continue home PT until meets goals  All questions and concerns are addressed with understanding verbalized.  You have chosen to receive care through a telephone visit. Do you consent to use a telephone visit for your medical care today? Yes  This visit has been rescheduled as a phone visit to comply with patient safety concerns in accordance with CDC recommendations. Total time of  discussion was 16 minutes.

## 2020-11-09 RX ORDER — LIDOCAINE 50 MG/G
1 PATCH TOPICAL EVERY 24 HOURS
Qty: 30 PATCH | Refills: 2 | Status: SHIPPED | OUTPATIENT
Start: 2020-11-09 | End: 2020-11-09 | Stop reason: SDUPTHER

## 2020-11-09 RX ORDER — LIDOCAINE 50 MG/G
1 PATCH TOPICAL EVERY 24 HOURS
Qty: 90 PATCH | Refills: 2 | Status: SHIPPED | OUTPATIENT
Start: 2020-11-09

## 2020-11-30 ENCOUNTER — TELEPHONE (OUTPATIENT)
Dept: ONCOLOGY | Facility: CLINIC | Age: 85
End: 2020-11-30

## 2020-12-01 ENCOUNTER — APPOINTMENT (OUTPATIENT)
Dept: ONCOLOGY | Facility: CLINIC | Age: 85
End: 2020-12-01

## 2020-12-01 ENCOUNTER — APPOINTMENT (OUTPATIENT)
Dept: ONCOLOGY | Facility: HOSPITAL | Age: 85
End: 2020-12-01

## 2020-12-14 DIAGNOSIS — S22.42XS: ICD-10-CM
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
## 2020-12-14 RX ORDER — HYDROCODONE BITARTRATE AND ACETAMINOPHEN 5; 325 MG/1; MG/1
1 TABLET ORAL 2 TIMES DAILY PRN
Qty: 40 TABLET | Refills: 0 | Status: SHIPPED | OUTPATIENT
Start: 2020-12-14

## 2020-12-21 DIAGNOSIS — E53.8 VITAMIN B12 DEFICIENCY: Primary | ICD-10-CM

## 2020-12-29 ENCOUNTER — OFFICE VISIT (OUTPATIENT)
Dept: PULMONOLOGY | Facility: CLINIC | Age: 85
End: 2020-12-29

## 2020-12-29 VITALS
HEIGHT: 64 IN | WEIGHT: 100 LBS | BODY MASS INDEX: 17.07 KG/M2 | HEART RATE: 89 BPM | DIASTOLIC BLOOD PRESSURE: 55 MMHG | TEMPERATURE: 97.7 F | SYSTOLIC BLOOD PRESSURE: 100 MMHG

## 2020-12-29 DIAGNOSIS — J45.20 MILD INTERMITTENT ASTHMA WITHOUT COMPLICATION: ICD-10-CM

## 2020-12-29 DIAGNOSIS — J84.112 UIP (USUAL INTERSTITIAL PNEUMONITIS) (HCC): Primary | ICD-10-CM

## 2020-12-29 RX ORDER — TRAMADOL HYDROCHLORIDE 50 MG/1
50 TABLET ORAL
COMMUNITY
Start: 2020-12-10 | End: 2021-09-01

## 2020-12-29 NOTE — PROGRESS NOTES
Pulmonary Consultation    No ref. provider found,    Thank you for asking me to see Monserrat Downs for   Chief Complaint   Patient presents with   • Cough     former Dr. Sparks    .      History of Present Illness  Monserrat Downs is a 89 y.o. female with a PMH significant for usual interstitial pneumonitis along with mild intermittent asthma is on a telephone visit because of Covid patient complains of cough with scant mucoid expectoration along with shortness of breath on any activity she is using her albuterol inhaler along with Breo she is presently not on home oxygen       Tobacco use history:  Type: na        Review of Systems: History obtained from chart review and the patient.  Review of Systems she does complain of shortness of breath on activity along with dry cough with occasional mucoid expectoration otherwise review of systems is negative  As described in the HPI. Otherwise, remainder of ROS (14 systems) were negative.    Patient Active Problem List   Diagnosis   • UIP (usual interstitial pneumonitis) (CMS/HCC)   • Chronic cough   • Constipation   • Chronic pain of both knees   • Generalized OA   • Hyperglycemia   • Hyperlipidemia   • B12 deficiency   • Primary osteoarthritis of both knees   • Radiculopathy of leg   • Trochanteric bursitis of right hip   • Chronic low back pain   • Scoliosis (and kyphoscoliosis), idiopathic   • Abscess   • Perirectal abscess   • Carotid artery stenosis   • Benign essential hypertension   • Interstitial pneumonitis (CMS/HCC)   • Environmental allergies   • Advanced age   • Mild intermittent asthma without complication   • Arthralgia   • Abnormal blood finding   • Fall   • Closed fracture of multiple ribs of left side         Current Outpatient Medications:   •  albuterol sulfate  (90 Base) MCG/ACT inhaler, Inhale 2 puffs Every 4 (Four) Hours As Needed for Wheezing or Shortness of Air., Disp: 54 g, Rfl: 4  •  aspirin 81 MG EC tablet, 1 tablet, delayed release  (/EC) at bedtime Oral, Disp: , Rfl:   •  atorvastatin (LIPITOR) 10 MG tablet, TAKE 1 TABLET DAILY, Disp: 90 tablet, Rfl: 3  •  Biotin 76960 MCG tablet dispersible, Take  by mouth., Disp: , Rfl:   •  Calcium Citrate-Vitamin D (CITRACAL + D PO), 1 tablet, chewable every other day Oral, Disp: , Rfl:   •  celecoxib (CeleBREX) 200 MG capsule, TAKE 1 CAPSULE DAILY, Disp: 90 capsule, Rfl: 3  •  Cyanocobalamin (B-12) 1000 MCG tablet, Take  by mouth., Disp: , Rfl:   •  Fluticasone Furoate-Vilanterol (Breo Ellipta) 200-25 MCG/INH inhaler, Inhale 1 puff Daily., Disp: 3 inhaler, Rfl: 4  •  HYDROcodone-acetaminophen (NORCO) 5-325 MG per tablet, Take 1 tablet by mouth 2 (Two) Times a Day As Needed for Moderate Pain ., Disp: 40 tablet, Rfl: 0  •  lidocaine (LIDODERM) 5 %, Place 1 patch on the skin as directed by provider Daily. Remove & Discard patch within 12 hours or as directed by MD, Disp: 90 patch, Rfl: 2  •  Multiple Vitamins-Minerals (CENTRUM SILVER PO), 1 tablet every other day Oral, Disp: , Rfl:   •  omeprazole (priLOSEC) 40 MG capsule, Take 1 capsule by mouth Daily., Disp: 90 capsule, Rfl: 2  •  polyethylene glycol (MIRALAX) packet, Take 17 g by mouth Daily., Disp: , Rfl:   •  senna (SENOKOT) 8.6 MG tablet tablet, 2 tablet every day with supper Oral PRN, Disp: , Rfl:   •  traMADol (ULTRAM) 50 MG tablet, Take 50 mg by mouth. As needed, Disp: , Rfl:     No Known Allergies    Past Medical History:   Diagnosis Date   • Abscess of buttock    • Anorectal abscess    • Benign essential hypertension    • Benign hypertension    • Carotid artery stenosis    • Cellulitis, abdominal wall    • Closed fracture of femur (CMS/HCC)    • COPD (chronic obstructive pulmonary disease) (CMS/HCC)    • Hip pain    • Hyperglycemia    • Hyperlipidemia    • Influenza vaccine needed    • Osteoarthritis    • Primary generalized (osteo)arthritis    • S/P breast biopsy, right      Past Surgical History:   Procedure Laterality Date   • INJECTION OF  "MEDICATION  07/12/2016    B12 (3)   • INJECTION OF MEDICATION  05/04/2016    Depo Medrol (Methylprednisone) (1)   • INJECTION OF MEDICATION  10/27/2015    Depo Medrol (Methylprednisone) 80mg (8)   • INJECTION OF MEDICATION  10/03/2012    Rocephin (1)   • INJECTION OF MEDICATION  06/26/2015    Toradol (6)   • INTRAMEDULLARY NAILING/RODDING HUMERUS, TIBIA, FEMUR  03/14/2014    Orthotic procedure     Social History     Socioeconomic History   • Marital status:      Spouse name: Neha   • Number of children: 3   • Years of education: Not on file   • Highest education level: Not on file   Tobacco Use   • Smoking status: Never Smoker   • Smokeless tobacco: Never Used   Substance and Sexual Activity   • Alcohol use: No   • Drug use: No   • Sexual activity: Not Currently     Partners: Male     Family History   Problem Relation Age of Onset   • Ovarian cancer Mother         went to colon   • Lung cancer Father           Objective     Blood pressure 100/55, pulse 89, temperature 97.7 °F (36.5 °C), height 162.6 cm (64\"), weight 45.4 kg (100 lb), not currently breastfeeding.  Physical Exam this was a telephone visit she does not seem to be in any respiratory distress or having any wheezing she is alert and oriented and her speech is normal    Radiology (independently reviewed and interpreted by me): Chest x-ray and CT scan shows evidence of pulmonary fibrosis with honeycombing and picture suggestive of usual interstitial pneumonia       Assessment/Plan     Diagnoses and all orders for this visit:    1. UIP (usual interstitial pneumonitis) (CMS/HCC) (Primary)    2. Mild intermittent asthma without complication         Discussion/ Recommendations:   Discussed with the patient that she needs to come for follow-up in the next month to assess for need for oxygen patient is advised to continue her albuterol inhaler as needed along with Breo once daily    Patient's Body mass index is 17.16 kg/m². BMI is below normal " parameters. Recommendations include: referral to primary care.           Return in about 4 weeks (around 1/26/2021).      Thank you for allowing me to participate in the care of Monserrat Downs. Please do not hesitate to contact me with any questions.         This document has been electronically signed by Roger Kim MD on December 29, 2020 15:36 CST

## 2021-01-12 ENCOUNTER — OFFICE VISIT (OUTPATIENT)
Dept: FAMILY MEDICINE CLINIC | Facility: CLINIC | Age: 86
End: 2021-01-12

## 2021-01-12 DIAGNOSIS — J84.89 INTERSTITIAL PNEUMONITIS (HCC): ICD-10-CM

## 2021-01-12 DIAGNOSIS — J44.9 CHRONIC OBSTRUCTIVE PULMONARY DISEASE, UNSPECIFIED COPD TYPE (HCC): Chronic | ICD-10-CM

## 2021-01-12 DIAGNOSIS — R09.02 HYPOXIA: Primary | ICD-10-CM

## 2021-01-12 PROCEDURE — 99443 PR PHYS/QHP TELEPHONE EVALUATION 21-30 MIN: CPT | Performed by: NURSE PRACTITIONER

## 2021-01-12 NOTE — PROGRESS NOTES
Subjective   Monserrat Downs is a 89 y.o. female. Patient here today with complaints of No chief complaint on file.  Patient here today for telehealth visit with complaints of feeling weak short of breath coughing with history of COPD, has been checking oxygen level at home with her sitter who is a nurse and it has been running 82 to 83% at rest without oxygen.  Her pulmonologist has left and she is now seeing a new pulmonologist with [facility], Dr. Kim.  Does not have follow-up with him again until March.    There were no vitals filed for this visit.  There is no height or weight on file to calculate BMI.  Past Medical History:   Diagnosis Date   • Abscess of buttock    • Anorectal abscess    • Benign essential hypertension    • Benign hypertension    • Carotid artery stenosis    • Cellulitis, abdominal wall    • Closed fracture of femur (CMS/HCC)    • COPD (chronic obstructive pulmonary disease) (CMS/HCC)    • Hip pain    • Hyperglycemia    • Hyperlipidemia    • Influenza vaccine needed    • Osteoarthritis    • Primary generalized (osteo)arthritis    • S/P breast biopsy, right      COPD  She complains of shortness of breath. This is a chronic problem. The current episode started more than 1 year ago. The problem occurs constantly. The problem has been gradually worsening. Associated symptoms include appetite change. Pertinent negatives include no fever. Her symptoms are aggravated by any activity. She reports minimal improvement on treatment. Her past medical history is significant for COPD.        The following portions of the patient's history were reviewed and updated as appropriate: allergies, current medications, past family history, past medical history, past social history, past surgical history and problem list.    Review of Systems   Constitutional: Positive for activity change and appetite change. Negative for fever.   Respiratory: Positive for shortness of breath.    Musculoskeletal: Positive for gait  problem.       Objective   Physical Exam  Deferred, telephone visit   Assessment/Plan   Diagnoses and all orders for this visit:    1. Hypoxia (Primary)  Comments:  pulse ox on RA running 82-83% at rest    2. Chronic obstructive pulmonary disease, unspecified COPD type (CMS/HCC)    3. Interstitial pneumonitis (CMS/HCC)    You have chosen to receive care through a telephone visit. Do you consent to use a telephone visit for your medical care today? Yes  This visit has been rescheduled as a phone visit to comply with patient safety concerns in accordance with CDC recommendations. Total time of discussion was 18 minutes.  Oxygen ordered for her, advised on how to use and asked that as long as her pulse ox is lower than 90% to use  Will [facility] who says they will be able to deliver to pt today  Will follow up here as scheduled for chronic conditions. Follow up with pulm as scheduled in march 2021  Pulse ox today at home 82% without oxygen at rest  Patient aware and in agreement to this plan, daughter and sitter informed of plan as well  All questions and concerns addressed with understanding verbalized.

## 2021-01-13 DIAGNOSIS — J84.112 UIP (USUAL INTERSTITIAL PNEUMONITIS) (HCC): ICD-10-CM

## 2021-01-13 RX ORDER — ALBUTEROL SULFATE 90 UG/1
2 AEROSOL, METERED RESPIRATORY (INHALATION) EVERY 4 HOURS PRN
Qty: 180 G | Refills: 4 | Status: SHIPPED | OUTPATIENT
Start: 2021-01-13

## 2021-01-21 DIAGNOSIS — R53.1 WEAKNESS: Primary | ICD-10-CM

## 2021-01-22 ENCOUNTER — TELEPHONE (OUTPATIENT)
Dept: FAMILY MEDICINE CLINIC | Facility: CLINIC | Age: 86
End: 2021-01-22

## 2021-01-22 NOTE — TELEPHONE ENCOUNTER
Joyce from home health called stating that she went to admit the pt today for home health and the pt stated she is suppose to be taking b12 injections. I informed her that there is an order for b12 labs in her chart from 12/2020 that was not done. She asked me to fax the order and said she will do the labs next week when she goes back out to the home. She also stated that the pt has several medications in the home that she says she is taking that are not on the her med list.   I faxed the lab order to her.

## 2021-02-02 ENCOUNTER — OFFICE VISIT (OUTPATIENT)
Dept: FAMILY MEDICINE CLINIC | Facility: CLINIC | Age: 86
End: 2021-02-02

## 2021-02-02 DIAGNOSIS — J84.89 INTERSTITIAL PNEUMONITIS (HCC): Primary | Chronic | ICD-10-CM

## 2021-02-02 DIAGNOSIS — J44.9 CHRONIC OBSTRUCTIVE PULMONARY DISEASE, UNSPECIFIED COPD TYPE (HCC): Chronic | ICD-10-CM

## 2021-02-02 DIAGNOSIS — R09.02 HYPOXIA: Chronic | ICD-10-CM

## 2021-02-02 PROCEDURE — 99443 PR PHYS/QHP TELEPHONE EVALUATION 21-30 MIN: CPT | Performed by: NURSE PRACTITIONER

## 2021-02-02 RX ORDER — CYANOCOBALAMIN 1000 UG/ML
INJECTION, SOLUTION INTRAMUSCULAR; SUBCUTANEOUS
Qty: 10 ML | Refills: 0 | Status: SHIPPED | OUTPATIENT
Start: 2021-02-02

## 2021-02-02 NOTE — PROGRESS NOTES
"Subjective   Monserrat Downs is a 89 y.o. female. Patient here today with complaints of Shortness of Breath  pt here today for telehealth visit with zayra for recheck of pulmonary fibrosis, COPD, currently reports using oxygen most \"all the time\". Reports still using breo, using albuterol neb daily and has ventolin to use during the day prn as well. She has home health per [facility], sitter states they are coming twice per week, has PT currently, unsure how much longer they are going to be able to continue. Requesting referral to dr ramirez since dr ruffin has relocated. Due for repeat labs , will contact Home Health to obtain    There were no vitals filed for this visit.  There is no height or weight on file to calculate BMI.  Past Medical History:   Diagnosis Date   • Abscess of buttock    • Anorectal abscess    • Benign essential hypertension    • Benign hypertension    • Carotid artery stenosis    • Cellulitis, abdominal wall    • Closed fracture of femur (CMS/HCC)    • COPD (chronic obstructive pulmonary disease) (CMS/HCC)    • Hip pain    • Hyperglycemia    • Hyperlipidemia    • Influenza vaccine needed    • Osteoarthritis    • Primary generalized (osteo)arthritis    • S/P breast biopsy, right      Shortness of Breath  This is a chronic problem. The current episode started more than 1 year ago. The problem occurs constantly. The problem has been waxing and waning. The symptoms are aggravated by any activity. She has tried rest, steroid inhalers and beta agonist inhalers (oxygen prn, using most of the time now) for the symptoms. The treatment provided mild relief. Her past medical history is significant for chronic lung disease, COPD and pneumonia.   COPD  She complains of shortness of breath. This is a chronic problem. The current episode started more than 1 year ago. The problem occurs constantly. The problem has been gradually worsening. Her symptoms are aggravated by any activity. Her symptoms are alleviated by " "rest, steroid inhaler and beta-agonist. She reports minimal improvement on treatment. Her past medical history is significant for COPD and pneumonia.        The following portions of the patient's history were reviewed and updated as appropriate: allergies, current medications, past family history, past medical history, past social history, past surgical history and problem list.    Review of Systems   Respiratory: Positive for shortness of breath.        Objective   Physical Exam  Deferred, telephone visit   Assessment/Plan   Diagnoses and all orders for this visit:    1. Interstitial pneumonitis (CMS/HCC) (Primary)  -     Ambulatory Referral to Pulmonology  -     CBC & Differential; Future  -     Comprehensive Metabolic Panel; Future    2. Chronic obstructive pulmonary disease, unspecified COPD type (CMS/HCC)  -     Ambulatory Referral to Pulmonology  -     CBC & Differential; Future  -     Comprehensive Metabolic Panel; Future    3. Hypoxia  Comments:  currently using home oxygen \"nearly all the time\", pulse ox on 4L o2 and while resting today 99% per sitter   Orders:  -     Ambulatory Referral to Pulmonology  -     CBC & Differential; Future  -     Comprehensive Metabolic Panel; Future    Other orders  -     cyanocobalamin 1000 MCG/ML injection; 1ML inj weekly x 4 then monthly, repeat b12 level in 6 months  Dispense: 10 mL; Refill: 0    Labs are reviewed at today's visit  Will repeat CBC and CMP per home health   Will refer to tashi mullins, per patient request   Continue with inhalers/ neb and oxygen as is currently using  Start b12 inj per home health weekly x 4 and then monthly, repeat b12 level in 6 months, home health aware and RX sent to pharmacy for b12 injections  All questions and concerns are addressed with understanding noted  They are aware and are in agreement to this plan  You have chosen to receive care through a telephone visit. Do you consent to use a telephone visit for your medical care " today? Yes  This visit has been rescheduled as a phone visit to comply with patient safety concerns in accordance with CDC recommendations. Total time of discussion was 25 minutes.

## 2021-02-12 DIAGNOSIS — J84.89 INTERSTITIAL PNEUMONITIS (HCC): Chronic | ICD-10-CM

## 2021-02-12 DIAGNOSIS — J44.9 CHRONIC OBSTRUCTIVE PULMONARY DISEASE, UNSPECIFIED COPD TYPE (HCC): Chronic | ICD-10-CM

## 2021-02-12 DIAGNOSIS — R09.02 HYPOXIA: Chronic | ICD-10-CM

## 2021-02-16 ENCOUNTER — DOCUMENTATION (OUTPATIENT)
Dept: PULMONOLOGY | Facility: CLINIC | Age: 86
End: 2021-02-16

## 2021-02-18 RX ORDER — ALBUTEROL SULFATE 2.5 MG/3ML
2.5 SOLUTION RESPIRATORY (INHALATION) EVERY 4 HOURS PRN
Qty: 9 ML | Refills: 1 | Status: SHIPPED | OUTPATIENT
Start: 2021-02-18 | End: 2021-02-22 | Stop reason: SDUPTHER

## 2021-02-25 RX ORDER — ALBUTEROL SULFATE 2.5 MG/3ML
2.5 SOLUTION RESPIRATORY (INHALATION) EVERY 4 HOURS PRN
Qty: 30 EACH | Refills: 1 | Status: SHIPPED | OUTPATIENT
Start: 2021-02-25

## 2021-03-03 RX ORDER — ATORVASTATIN CALCIUM 10 MG/1
TABLET, FILM COATED ORAL
Qty: 90 TABLET | Refills: 3 | Status: SHIPPED | OUTPATIENT
Start: 2021-03-03

## 2021-03-22 RX ORDER — CELECOXIB 200 MG/1
CAPSULE ORAL
Qty: 90 CAPSULE | Refills: 3 | Status: SHIPPED | OUTPATIENT
Start: 2021-03-22

## 2021-06-01 RX ORDER — OMEPRAZOLE 40 MG/1
CAPSULE, DELAYED RELEASE ORAL
Qty: 90 CAPSULE | Refills: 3 | Status: SHIPPED | OUTPATIENT
Start: 2021-06-01

## 2021-06-02 ENCOUNTER — OFFICE VISIT (OUTPATIENT)
Dept: FAMILY MEDICINE CLINIC | Facility: CLINIC | Age: 86
End: 2021-06-02

## 2021-06-02 DIAGNOSIS — J18.9 PNEUMONIA DUE TO INFECTIOUS ORGANISM, UNSPECIFIED LATERALITY, UNSPECIFIED PART OF LUNG: ICD-10-CM

## 2021-06-02 DIAGNOSIS — R93.5 ABNORMAL CT OF THE ABDOMEN: ICD-10-CM

## 2021-06-02 DIAGNOSIS — K59.00 CONSTIPATION, UNSPECIFIED CONSTIPATION TYPE: Primary | ICD-10-CM

## 2021-06-02 PROCEDURE — 99443 PR PHYS/QHP TELEPHONE EVALUATION 21-30 MIN: CPT | Performed by: NURSE PRACTITIONER

## 2021-06-02 RX ORDER — SACCHAROMYCES BOULARDII 250 MG
250 CAPSULE ORAL
COMMUNITY
Start: 2021-05-28 | End: 2021-06-05

## 2021-06-02 RX ORDER — PREDNISONE 1 MG/1
5 TABLET ORAL DAILY
COMMUNITY
Start: 2021-03-24

## 2021-06-02 RX ORDER — LEVALBUTEROL INHALATION SOLUTION 1.25 MG/3ML
SOLUTION RESPIRATORY (INHALATION)
COMMUNITY
Start: 2021-05-28

## 2021-06-02 NOTE — PROGRESS NOTES
This was an audio and video enabled telemedicine encounter.    Chief Complaint  Constipation and Abnormal CT (Abdomen )    Subjective          The patient presents today via telephone regarding constipation, and an abnormal CT at urgent care a few days ago. On 05/28/2021, the patient presented to urgent care, and was told she had pneumonia, and she was given antibiotics; she thinks she was given penicillin. She reports she has difficulty with bowel movements x years, and it has been approximately 5 days since her last bowel movement. The patient is able to pass gas. Reports feeling weak    Monserrat Downs presents to [facility]  History of Present Illness    Objective   Vital Signs:   There were no vitals taken for this visit.    Physical Exam  Constitutional:       Comments: Deferred, telephone visit.         Result Review :        LIPASE (05/28/2021 17:35 EDT)  AMYLASE (05/28/2021 17:35 EDT)  COMPREHENSIVE METABOLIC PANEL (05/28/2021 17:35 EDT)  CBC AND DIFFERENTIAL (05/28/2021 17:35 EDT)              Assessment and Plan    Diagnoses and all orders for this visit:    1. Constipation, unspecified constipation type (Primary)  -     CT Abdomen Pelvis With Contrast; Future    2. Abnormal CT of the abdomen  -     CT Abdomen Pelvis With Contrast; Future    3. Pneumonia due to infectious organism, unspecified laterality, unspecified part of lung      Urgent care records are reviewed. She did recently have a normal BUN, and creatinine 5 days ago. CAT scan with contrast is ordered, and she will be informed of these results by telephone. Depending on these results will determine if she needs to be treated for constipation or not. I did contact her daughter, Danielle, and informed her of the conversation I had with the patient, and the plan that was made. All questions and concerns are addressed with understanding verbalized. The patient is in agreement to above plan.    I spent 22 minutes caring  for Monserrat on this date of service. This time includes time spent by me in the following activities:preparing for the visit, reviewing tests, counseling and educating the patient/family/caregiver, ordering medications, tests, or procedures and documenting information in the medical record  Follow Up   Return if symptoms worsen or fail to improve, for Recheck, or sooner as needed.  Patient was given instructions and counseling regarding her condition or for health maintenance advice. Please see specific information pulled into the AVS if appropriate.     This visit has been rescheduled as a phone visit to comply with patient safety concerns in accordance with CDC recommendations. Total time of discussion was 22 minutes.    You have chosen to receive care through a telephone visit. Do you consent to use a telephone visit for your medical care today? yes    Scribed for PIETER Saul by Danitza Flynn.  06/02/21   09:40 CDT    I have personally performed the services described in this document as scribed by the above individual, and it is both accurate and complete.  PIETER Saul  6/2/2021  12:13 CDT

## 2021-06-04 ENCOUNTER — TELEPHONE (OUTPATIENT)
Dept: FAMILY MEDICINE CLINIC | Facility: CLINIC | Age: 86
End: 2021-06-04

## 2021-06-04 NOTE — TELEPHONE ENCOUNTER
I read LOU Meredith's message to the patient. She will call us to let us know if she wants a medrol dose pk. All directions were understood and Arelis will let us know if there is no improvement.       ----- Message from PIETER Bliss sent at 6/3/2021  5:11 PM CDT -----  Inform pt /daughter, Arelis Fried about results  Advise if she is not already taking to start miralax and continue stool softener daily with increased fiber and fluids  If already has tried miralax, can advise using 1/2 bottle of mag citrate , if no results can drink the other half of mag citrate    Let me know if no results    Pt does have known pulm fibrosis, if still complaining of difficulty breathing, coughing please give her medrol dose pk and monitor pulse ox at home  If not improved may need to come in for eval/physical exam thanks     If she has inhaler/nebs she does need to continue using them as well

## 2021-07-13 RX ORDER — NYSTATIN AND TRIAMCINOLONE ACETONIDE 100000; 1 [USP'U]/G; MG/G
OINTMENT TOPICAL 2 TIMES DAILY
Qty: 60 G | Refills: 2 | Status: SHIPPED | OUTPATIENT
Start: 2021-07-13

## 2021-08-31 DIAGNOSIS — J45.20 MILD INTERMITTENT ASTHMA WITHOUT COMPLICATION: ICD-10-CM

## 2021-09-01 RX ORDER — TRAZODONE HYDROCHLORIDE 50 MG/1
50 TABLET ORAL NIGHTLY
Qty: 30 TABLET | Refills: 0 | Status: SHIPPED | OUTPATIENT
Start: 2021-09-01

## 2021-09-03 DIAGNOSIS — L89.309 PRESSURE INJURY OF SKIN OF BUTTOCK, UNSPECIFIED INJURY STAGE, UNSPECIFIED LATERALITY: Primary | ICD-10-CM

## 2021-10-02 ENCOUNTER — DOCUMENTATION (OUTPATIENT)
Dept: FAMILY MEDICINE CLINIC | Facility: CLINIC | Age: 86
End: 2021-10-02

## 2021-10-02 DIAGNOSIS — R05.9 COUGH: ICD-10-CM

## 2021-10-02 DIAGNOSIS — Z20.822 CLOSE EXPOSURE TO COVID-19 VIRUS: Primary | ICD-10-CM

## 2021-10-05 ENCOUNTER — TELEPHONE (OUTPATIENT)
Dept: ONCOLOGY | Facility: CLINIC | Age: 86
End: 2021-10-05

## 2021-10-05 NOTE — TELEPHONE ENCOUNTER
Called pt daughter to set up antibody infusion daughter wants infusion at [place],she is calling ordering md office for referral.

## 2021-10-06 ENCOUNTER — TELEPHONE (OUTPATIENT)
Dept: FAMILY MEDICINE CLINIC | Facility: CLINIC | Age: 86
End: 2021-10-06

## 2021-10-06 RX ORDER — AZITHROMYCIN 250 MG/1
TABLET, FILM COATED ORAL
Qty: 6 TABLET | Refills: 0 | Status: SHIPPED | OUTPATIENT
Start: 2021-10-06

## 2021-10-06 NOTE — TELEPHONE ENCOUNTER
Spoke to Arturo at [facility] with new orders and he will call patient. TP          ----- Message from Tru West MD sent at 10/6/2021  2:55 PM CDT -----  Z-pack. EB  ----- Message -----  From: Giovany Heard RN  Sent: 10/6/2021   2:17 PM CDT  To: Tru West MD    This is Ivon's patient that is Covid positive and I sent order for infusion  to [facility] as they requested.  called and states she is about the same as normally she is but the nurse hears some crackles in one lung. She didn't know if you would want to start an antibiotic. She is on oxygen already and has inhalers.  giovany